# Patient Record
Sex: FEMALE | Race: WHITE | NOT HISPANIC OR LATINO | Employment: OTHER | URBAN - METROPOLITAN AREA
[De-identification: names, ages, dates, MRNs, and addresses within clinical notes are randomized per-mention and may not be internally consistent; named-entity substitution may affect disease eponyms.]

---

## 2017-01-10 DIAGNOSIS — I48.91 ATRIAL FIBRILLATION, UNSPECIFIED TYPE: Primary | ICD-10-CM

## 2017-03-08 DIAGNOSIS — R41.3 MEMORY LOSS: ICD-10-CM

## 2017-03-08 RX ORDER — MEMANTINE HYDROCHLORIDE 5 MG/1
TABLET ORAL
Qty: 80 TABLET | Refills: 0 | Status: SHIPPED | OUTPATIENT
Start: 2017-03-08 | End: 2017-03-21 | Stop reason: SDUPTHER

## 2017-03-08 NOTE — TELEPHONE ENCOUNTER
Pt has an appointment scheduled for Dr. Laureano on 04/11/2017 at 8 o'clock. However, she does not have anymore Namenda. Pt daughter is requesting a refill on the medication until the patient is seen by Dr. Laureano.

## 2017-03-16 DIAGNOSIS — R41.3 MEMORY LOSS: ICD-10-CM

## 2017-03-16 RX ORDER — MEMANTINE HYDROCHLORIDE 5 MG/1
TABLET ORAL
Qty: 80 TABLET | Refills: 0 | Status: CANCELLED | OUTPATIENT
Start: 2017-03-16

## 2017-03-21 DIAGNOSIS — R41.3 MEMORY LOSS: ICD-10-CM

## 2017-03-22 RX ORDER — MEMANTINE HYDROCHLORIDE 5 MG/1
TABLET ORAL
Qty: 80 TABLET | Refills: 0 | Status: SHIPPED | OUTPATIENT
Start: 2017-03-22 | End: 2017-04-11

## 2017-04-11 ENCOUNTER — OFFICE VISIT (OUTPATIENT)
Dept: NEUROLOGY | Facility: CLINIC | Age: 82
End: 2017-04-11
Payer: MEDICARE

## 2017-04-11 VITALS
HEART RATE: 80 BPM | WEIGHT: 195.75 LBS | DIASTOLIC BLOOD PRESSURE: 88 MMHG | BODY MASS INDEX: 33.42 KG/M2 | SYSTOLIC BLOOD PRESSURE: 160 MMHG | HEIGHT: 64 IN

## 2017-04-11 DIAGNOSIS — G30.1 LATE ONSET ALZHEIMER'S DISEASE WITHOUT BEHAVIORAL DISTURBANCE: Primary | ICD-10-CM

## 2017-04-11 DIAGNOSIS — F02.80 LATE ONSET ALZHEIMER'S DISEASE WITHOUT BEHAVIORAL DISTURBANCE: Primary | ICD-10-CM

## 2017-04-11 PROCEDURE — 1157F ADVNC CARE PLAN IN RCRD: CPT | Mod: S$GLB,,, | Performed by: PSYCHIATRY & NEUROLOGY

## 2017-04-11 PROCEDURE — 99499 UNLISTED E&M SERVICE: CPT | Mod: S$GLB,,, | Performed by: PSYCHIATRY & NEUROLOGY

## 2017-04-11 PROCEDURE — 3079F DIAST BP 80-89 MM HG: CPT | Mod: S$GLB,,, | Performed by: PSYCHIATRY & NEUROLOGY

## 2017-04-11 PROCEDURE — 99999 PR PBB SHADOW E&M-EST. PATIENT-LVL III: CPT | Mod: PBBFAC,,, | Performed by: PSYCHIATRY & NEUROLOGY

## 2017-04-11 PROCEDURE — 3077F SYST BP >= 140 MM HG: CPT | Mod: S$GLB,,, | Performed by: PSYCHIATRY & NEUROLOGY

## 2017-04-11 PROCEDURE — 1126F AMNT PAIN NOTED NONE PRSNT: CPT | Mod: S$GLB,,, | Performed by: PSYCHIATRY & NEUROLOGY

## 2017-04-11 PROCEDURE — 99204 OFFICE O/P NEW MOD 45 MIN: CPT | Mod: S$GLB,,, | Performed by: PSYCHIATRY & NEUROLOGY

## 2017-04-11 PROCEDURE — 1159F MED LIST DOCD IN RCRD: CPT | Mod: S$GLB,,, | Performed by: PSYCHIATRY & NEUROLOGY

## 2017-04-11 PROCEDURE — 1160F RVW MEDS BY RX/DR IN RCRD: CPT | Mod: S$GLB,,, | Performed by: PSYCHIATRY & NEUROLOGY

## 2017-04-11 NOTE — PROGRESS NOTES
This is an 81-year-old right-handed patient who is accompanied to the office today by her  and daughter for follow-up visit after she had been seen by neurology about 2 years ago for complaints of memory impairment.  On a previous visit, the patient had appropriate laboratory studies done including CT scan, B12 level, and TSH.  The patient was placed on Namenda which she has continued to take.    The patient's daughter indicates that the patient has had progressive decline in recent memory.  She does not recall names, does not remember or recall content of conversations, and asks for repeats multiple times over and over.  She remains independent for dressing, bathing and hygiene.  However, cooking in her kitchen is increasingly difficult as she cannot remember recipes.  She will then call her daughter and asked her how to perform certain cooking activities.    The daughter indicates that the patient is no longer writing checks or managing finances although she had done that most of her adult life.  She does have occasional difficulties understanding phone conversations.  She has not had any active hallucinations and there is no evidence of sundowning are nocturnal wandering.  There has been no change in her diet or weight.    The patient has significant mobility difficulties because of multiple orthopedic procedures on her back, hip, and knee although she remains independent for ambulation with a quad cane.  She has not had any seizure or unexplained loss of consciousness.  The patient's daughter indicates that she is physically inactive but continues to work crossword puzzles.      ROS:  GENERAL: No fever, chills, fatigability or weight loss.  SKIN: No rashes, itching or changes in color or texture of skin.  HEAD: No headaches or recent head trauma.  EYES: Visual acuity fine. No photophobia, ocular pain or diplopia.  EARS: Denies ear pain, discharge or vertigo.  NOSE: No loss of smell, no epistaxis or  postnasal drip.  MOUTH & THROAT: No hoarseness or change in voice. No excessive gum bleeding.  NODES: Denies swollen glands.  CHEST: Denies BOWMAN, cyanosis, wheezing, cough and sputum production.  CARDIOVASCULAR: Denies chest pain, PND, orthopnea or reduced exercise tolerance.  ABDOMEN: Appetite fine. No weight loss. Denies diarrhea, abdominal pain, hematemesis or blood in stool.  URINARY: No flank pain, dysuria or hematuria.  PERIPHERAL VASCULAR: No claudication or cyanosis.  MUSCULOSKELETAL: The patient has chronic back, hip and knee pain.  NEUROLOGIC: No history of seizures, paralysis, or unexplained loss of consciousness.    The patient's past history, family history, and social history were reviewed with the patient and her electronic medical record.    PE:   VITAL SIGNS: Blood pressure 160/88, pulse 80, weight 88.8 kg, height 5 foot 4 inches, BMI 33.60  APPEARANCE: Well nourished, well developed, in no acute distress.  The patient is very pleasant.  She is very neatly dressed.  HEAD: Normocephalic, atraumatic.  EYES: PERRL. EOMI.  Non-icteric sclerae.    EARS: TM's intact. Light reflex normal. No retraction or perforation.    NOSE: Mucosa pink. Airway clear.  MOUTH & THROAT: No tonsillar enlargement. No pharyngeal erythema or exudate. No stridor.  NECK: Supple. No bruits.  CHEST: Lungs clear to auscultation.  CARDIOVASCULAR: Regular rhythm without significant murmurs.  ABDOMEN: Bowel sounds normal. Not distended. Soft. No tenderness or masses.  MUSCULOSKELETAL:  No bony deformity seen.  Muscle tone and muscle mass appear normal both upper and both lower extremities.  NEUROLOGIC:   Mental Status:   The patient is attentive to the environment and cooperative for the exam.  The patient was able to repeat 3 digits backwards.  She was unable to spell the word world backwards.  She was able to identify 1 out of 3 animal pictures.  She was unable to recall any words on delayed recall.  Cranial Nerves: II-XII grossly  intact. Fundoscopic exam is normal.  No hemorrhage, exudate or papilledema is present. The extraocular muscles are intact in the cardinal directions of gaze.  No ptosis is present. Facial features are symmetrical.  Speech is normal in fluency, diction, and phrasing.  Tongue protrudes in the midline.    Gait and Station:  Romberg is negative.  The patient ambulates with a quad cane.  She is independent for sit to stand and independent with ambulation with her quad cane.  Motor:  No downdrift of either arm when held at shoulder level.  Manual muscle testing of proximal and distal muscles of both upper and lower extremities is normal.   Sensory:  Intact both upper and lower extremities to pin prick, touch, and vibration.  Cerebellar:  Finger to nose done well.  Alternating movements intact.  No involuntary movements or tremor seen.  Reflexes:  Stretch reflexes are 2+ both upper and lower extremities.  Plantar stimulation is flexor bilaterally and no pathological reflexes are seen       ASSESSMENT:  1.  Dementia, unknown type  2.  Straight of hypertension, hyperlipidemia, and hypothyroidism    RECOMMENDATIONS:  1.  Namenda seems to be of little or no benefit as judged by the patient and her family.  Therefore this medication will be discontinued.  We did discuss alternative medication such as Aricept or Exelon, but the patient and her family indicates that they feel she is taking enough medication and do not want to add medications.  I would concur with that thought.  2.  Return to neurology for follow-up visit in 6 months.    This was a 45 minute visit with the patient with over 50% of time spent counseling the patient and the family regarding the diagnosis and treatment of dementia.

## 2017-04-11 NOTE — MR AVS SNAPSHOT
[facility]  [address] [facility] [address]  Phone: [phone]  Fax: [phone]                  Delores M Cryer   2017 8:00 AM   Office Visit    Description:  Female : 1935   Provider:  Ankur Laureano MD   Department:  [facility]           Reason for Visit     Memory Loss           Diagnoses this Visit        Comments    Late onset Alzheimer's disease without behavioral disturbance    -  Primary            To Do List           Future Appointments        Provider Department Dept Phone    2017 1:30 PM Yi Gonzalez MD [facility] - Rheumatology [phone]      Goals (5 Years of Data)     None      Follow-Up and Disposition     Return in about 6 months (around 10/11/2017).      [facility]     [facility] Nurse Care Line -  Assistance  Unless otherwise directed by your provider, please contact [facility], our nurse care line that is available for  assistance.     Registered nurses in the [facility] provide: appointment scheduling, clinical advisement, health education, and other advisory services.  Call: [phone] (toll free)               Medications           Message regarding Medications     Verify the changes and/or additions to your medication regime listed below are the same as discussed with your clinician today.  If any of these changes or additions are incorrect, please notify your healthcare provider.        STOP taking these medications     memantine (NAMENDA) 5 MG Tab 1 tablet once a day for 2 weeks then 1 tablet twice a day.           Verify that the below list of medications is an accurate representation of the medications you are currently taking.  If none reported, the list may be blank. If incorrect, please contact your healthcare provider. Carry this list with you in case of emergency.           Current Medications     albuterol 90 mcg/actuation inhaler Inhale 2 puffs into the lungs  "every 6 (six) hours as needed for Wheezing.    aspirin 81 mg Tab Take 1 tablet by mouth Daily.    atorvastatin (LIPITOR) 20 MG tablet take 1 tablet by mouth every evening    calcium carbonate-vit D3-min (CALTRATE 600+D PLUS MINERALS) 600 mg (1,500 mg)-400 unit Chew Take 1 tablet by mouth Twice daily.    citalopram (CELEXA) 20 MG tablet take 1 tablet by mouth once daily    fluticasone (FLONASE) 50 mcg/actuation nasal spray 2 sprays by Each Nare route once daily.    guaifenesin (MUCINEX) 600 mg 12 hr tablet Take 2 tablets (1,200 mg total) by mouth 2 (two) times daily.    levothyroxine (SYNTHROID) 125 MCG tablet take 1 tablet by mouth once daily    losartan (COZAAR) 100 MG tablet Take 1 tablet (100 mg total) by mouth once daily.    metoprolol succinate (TOPROL-XL) 50 MG 24 hr tablet take 1 and 1/2 tablets by mouth twice a day    MULTIVITAMIN W-MINERALS/LUTEIN (CENTRUM SILVER ORAL) Take 1 tablet by mouth.    nitroGLYCERIN (NITROSTAT) 0.4 MG SL tablet Place 1 tablet under the tongue. Every 5 min - up to 3    omeprazole (PRILOSEC) 40 MG capsule take 1 capsule by mouth once daily    potassium chloride SA (KLOR-CON M20) 20 MEQ tablet Take 1 tablet (20 mEq total) by mouth 2 (two) times daily.           Clinical Reference Information           Your Vitals Were     BP Pulse Height Weight BMI    160/88 80 5' 4" (1.626 m) 88.8 kg (195 lb 12.3 oz) 33.6 kg/m2      Blood Pressure          Most Recent Value    BP  (!)  160/88      Allergies as of 4/11/2017     Alendronate    Nitrofurantoin Macrocrystalline    Sulfa (Sulfonamide Antibiotics)    Warfarin      Immunizations Administered on Date of Encounter - 4/11/2017     None      [facility] Sign-Up     Activating your [facility] account is as easy as 1-2-3!     1) Visit [email or web address], select Sign Up Now, enter this activation code and your date of birth, then select Next.  [email or web address]  Expires: 5/26/2017  8:46 AM      2) Create a username and password to use when you visit " [facility] in the future and select a security question in case you lose your password and select Next.    3) Enter your e-mail address and click Sign Up!    Additional Information  If you have questions, please e-mail [email or web address] or call [phone] to talk to our [facility] staff. Remember, [facility] is NOT to be used for urgent needs. For medical emergencies, dial 911.         Language Assistance Services     ATTENTION: Language assistance services are available, free of charge. Please call [phone].      ATENCIÓN: Si habla español, tiene a hackett disposición servicios gratuitos de asistencia lingüística. Llame al [phone].     CHÚ Ý: N?u b?n nói Ti?ng Vi?t, có các d?ch v? h? tr? ngôn ng? mi?n phí dành cho b?n. G?i s? [phone].         [facility] complies with applicable Federal civil rights laws and does not discriminate on the basis of race, color, national origin, age, disability, or sex.

## 2017-04-24 ENCOUNTER — PATIENT MESSAGE (OUTPATIENT)
Dept: FAMILY MEDICINE | Facility: CLINIC | Age: 82
End: 2017-04-24

## 2017-04-24 ENCOUNTER — OFFICE VISIT (OUTPATIENT)
Dept: FAMILY MEDICINE | Facility: CLINIC | Age: 82
End: 2017-04-24
Payer: MEDICARE

## 2017-04-24 ENCOUNTER — LAB VISIT (OUTPATIENT)
Dept: LAB | Facility: HOSPITAL | Age: 82
End: 2017-04-24
Attending: FAMILY MEDICINE
Payer: MEDICARE

## 2017-04-24 VITALS
HEIGHT: 64 IN | WEIGHT: 194.31 LBS | DIASTOLIC BLOOD PRESSURE: 83 MMHG | HEART RATE: 104 BPM | OXYGEN SATURATION: 98 % | SYSTOLIC BLOOD PRESSURE: 149 MMHG | BODY MASS INDEX: 33.17 KG/M2 | TEMPERATURE: 98 F

## 2017-04-24 DIAGNOSIS — I20.0 UNSTABLE ANGINA: ICD-10-CM

## 2017-04-24 DIAGNOSIS — E03.9 ACQUIRED HYPOTHYROIDISM: ICD-10-CM

## 2017-04-24 DIAGNOSIS — R79.9 ABNORMAL FINDING OF BLOOD CHEMISTRY: ICD-10-CM

## 2017-04-24 DIAGNOSIS — I48.0 PAROXYSMAL ATRIAL FIBRILLATION: Primary | ICD-10-CM

## 2017-04-24 DIAGNOSIS — M81.8 OSTEOPOROSIS OF DISUSE: ICD-10-CM

## 2017-04-24 DIAGNOSIS — I10 ESSENTIAL HYPERTENSION: ICD-10-CM

## 2017-04-24 DIAGNOSIS — I21.4 NSTEMI (NON-ST ELEVATED MYOCARDIAL INFARCTION): ICD-10-CM

## 2017-04-24 DIAGNOSIS — I21.4 NON-ST ELEVATION MYOCARDIAL INFARCTION (NSTEMI), SUBSEQUENT EPISODE OF CARE: ICD-10-CM

## 2017-04-24 LAB
ALBUMIN SERPL BCP-MCNC: 3.8 G/DL
ALP SERPL-CCNC: 122 U/L
ALT SERPL W/O P-5'-P-CCNC: 12 U/L
ANION GAP SERPL CALC-SCNC: 11 MMOL/L
AST SERPL-CCNC: 21 U/L
BASOPHILS # BLD AUTO: 0.02 K/UL
BASOPHILS NFR BLD: 0.2 %
BILIRUB SERPL-MCNC: 1.3 MG/DL
BUN SERPL-MCNC: 18 MG/DL
CALCIUM SERPL-MCNC: 9.2 MG/DL
CHLORIDE SERPL-SCNC: 105 MMOL/L
CHOLEST/HDLC SERPL: 3.5 {RATIO}
CO2 SERPL-SCNC: 24 MMOL/L
CREAT SERPL-MCNC: 0.8 MG/DL
DIFFERENTIAL METHOD: NORMAL
EOSINOPHIL # BLD AUTO: 0.1 K/UL
EOSINOPHIL NFR BLD: 1.4 %
ERYTHROCYTE [DISTWIDTH] IN BLOOD BY AUTOMATED COUNT: 13.5 %
EST. GFR  (AFRICAN AMERICAN): >60 ML/MIN/1.73 M^2
EST. GFR  (NON AFRICAN AMERICAN): >60 ML/MIN/1.73 M^2
GLUCOSE SERPL-MCNC: 82 MG/DL
HCT VFR BLD AUTO: 46.4 %
HDL/CHOLESTEROL RATIO: 28.5 %
HDLC SERPL-MCNC: 151 MG/DL
HDLC SERPL-MCNC: 43 MG/DL
HGB BLD-MCNC: 15.4 G/DL
LDLC SERPL CALC-MCNC: 88.8 MG/DL
LYMPHOCYTES # BLD AUTO: 2.4 K/UL
LYMPHOCYTES NFR BLD: 25.8 %
MCH RBC QN AUTO: 30.1 PG
MCHC RBC AUTO-ENTMCNC: 33.2 %
MCV RBC AUTO: 91 FL
MONOCYTES # BLD AUTO: 0.6 K/UL
MONOCYTES NFR BLD: 6.6 %
NEUTROPHILS # BLD AUTO: 6.1 K/UL
NEUTROPHILS NFR BLD: 65.8 %
NONHDLC SERPL-MCNC: 108 MG/DL
PLATELET # BLD AUTO: 168 K/UL
PMV BLD AUTO: 11.4 FL
POTASSIUM SERPL-SCNC: 4.3 MMOL/L
PROT SERPL-MCNC: 7 G/DL
RBC # BLD AUTO: 5.12 M/UL
SODIUM SERPL-SCNC: 140 MMOL/L
TRIGL SERPL-MCNC: 96 MG/DL
TSH SERPL DL<=0.005 MIU/L-ACNC: 2.39 UIU/ML
WBC # BLD AUTO: 9.28 K/UL

## 2017-04-24 PROCEDURE — 1159F MED LIST DOCD IN RCRD: CPT | Mod: S$GLB,,, | Performed by: FAMILY MEDICINE

## 2017-04-24 PROCEDURE — 99999 PR PBB SHADOW E&M-EST. PATIENT-LVL III: CPT | Mod: PBBFAC,,, | Performed by: FAMILY MEDICINE

## 2017-04-24 PROCEDURE — 1126F AMNT PAIN NOTED NONE PRSNT: CPT | Mod: S$GLB,,, | Performed by: FAMILY MEDICINE

## 2017-04-24 PROCEDURE — 36415 COLL VENOUS BLD VENIPUNCTURE: CPT | Mod: PO

## 2017-04-24 PROCEDURE — 1160F RVW MEDS BY RX/DR IN RCRD: CPT | Mod: S$GLB,,, | Performed by: FAMILY MEDICINE

## 2017-04-24 PROCEDURE — 83036 HEMOGLOBIN GLYCOSYLATED A1C: CPT

## 2017-04-24 PROCEDURE — 99499 UNLISTED E&M SERVICE: CPT | Mod: S$GLB,,, | Performed by: FAMILY MEDICINE

## 2017-04-24 PROCEDURE — 80053 COMPREHEN METABOLIC PANEL: CPT

## 2017-04-24 PROCEDURE — 3080F DIAST BP >= 90 MM HG: CPT | Mod: S$GLB,,, | Performed by: FAMILY MEDICINE

## 2017-04-24 PROCEDURE — 80061 LIPID PANEL: CPT

## 2017-04-24 PROCEDURE — 84443 ASSAY THYROID STIM HORMONE: CPT

## 2017-04-24 PROCEDURE — 99214 OFFICE O/P EST MOD 30 MIN: CPT | Mod: S$GLB,,, | Performed by: FAMILY MEDICINE

## 2017-04-24 PROCEDURE — 85025 COMPLETE CBC W/AUTO DIFF WBC: CPT

## 2017-04-24 PROCEDURE — 3077F SYST BP >= 140 MM HG: CPT | Mod: S$GLB,,, | Performed by: FAMILY MEDICINE

## 2017-04-24 RX ORDER — LEVOTHYROXINE SODIUM 125 UG/1
125 TABLET ORAL DAILY
Qty: 30 TABLET | Refills: 11 | Status: SHIPPED | OUTPATIENT
Start: 2017-04-24 | End: 2018-05-29 | Stop reason: SDUPTHER

## 2017-04-24 RX ORDER — ATORVASTATIN CALCIUM 20 MG/1
20 TABLET, FILM COATED ORAL NIGHTLY
Qty: 30 TABLET | Refills: 11 | Status: SHIPPED | OUTPATIENT
Start: 2017-04-24 | End: 2017-10-03 | Stop reason: SDUPTHER

## 2017-04-24 RX ORDER — FLUTICASONE PROPIONATE 50 MCG
2 SPRAY, SUSPENSION (ML) NASAL DAILY
Qty: 16 G | Refills: 11 | Status: SHIPPED | OUTPATIENT
Start: 2017-04-24 | End: 2018-05-28

## 2017-04-24 RX ORDER — CITALOPRAM 20 MG/1
20 TABLET, FILM COATED ORAL DAILY
Qty: 30 TABLET | Refills: 3 | Status: SHIPPED | OUTPATIENT
Start: 2017-04-24 | End: 2017-09-18 | Stop reason: SDUPTHER

## 2017-04-24 RX ORDER — OMEPRAZOLE 40 MG/1
40 CAPSULE, DELAYED RELEASE ORAL DAILY
Qty: 30 CAPSULE | Refills: 4 | Status: SHIPPED | OUTPATIENT
Start: 2017-04-24 | End: 2017-11-19 | Stop reason: SDUPTHER

## 2017-04-24 RX ORDER — LOSARTAN POTASSIUM 100 MG/1
100 TABLET ORAL DAILY
Qty: 30 TABLET | Refills: 11 | Status: SHIPPED | OUTPATIENT
Start: 2017-04-24 | End: 2017-10-03 | Stop reason: SDUPTHER

## 2017-04-24 NOTE — MR AVS SNAPSHOT
[facility]  [address]  [facility] [address]  Phone: [phone]  Fax: [phone]                  Delores M Cryer   2017 11:20 AM   Office Visit    Description:  Female : 1935   Provider:  Viviana Ervin MD   Department:  [facility]           Reason for Visit     Medication Refill           Diagnoses this Visit        Comments    Paroxysmal atrial fibrillation    -  Primary     Essential hypertension         Non-ST elevation myocardial infarction (NSTEMI), subsequent episode of care         Acquired hypothyroidism         Osteoporosis of disuse                To Do List           Future Appointments        Provider Department Dept Phone    2017 3:10 PM [facility]     2017 2:30 PM Jeet Lewis MD [facility] - Rheumatology [phone]      Goals (5 Years of Data)     None       These Medications        Disp Refills Start End    atorvastatin (LIPITOR) 20 MG tablet 30 tablet 11 2017     Take 1 tablet (20 mg total) by mouth every evening. - Oral    Pharmacy: [facility] [facility] [address] [place] [address] Ph #: [phone]       citalopram (CELEXA) 20 MG tablet 30 tablet 3 2017     Take 1 tablet (20 mg total) by mouth once daily. - Oral    Pharmacy: [facility] [facility] [address] [facility] Ph #: [phone]       fluticasone (FLONASE) 50 mcg/actuation nasal spray 16 g 11 2017     2 sprays by Each Nare route once daily. - Each Nare    Pharmacy: [facility] [facility] [address] [facility] Ph #: [phone]       levothyroxine (SYNTHROID) 125 MCG tablet 30 tablet 11 2017     Take 1 tablet (125 mcg total) by mouth once daily. - Oral    Pharmacy: [facility] [facility] [address] [facility] Ph #: [phone]       losartan (COZAAR) 100 MG tablet  30 tablet 11 4/24/2017     Take 1 tablet (100 mg total) by mouth once daily. - Oral    Pharmacy: [facility] [place] [place] [facility] [address] [facility] Ph #: [phone]       omeprazole (PRILOSEC) 40 MG capsule 30 capsule 4 4/24/2017     Take 1 capsule (40 mg total) by mouth once daily. - Oral    Pharmacy: [facility] [facility] [place] [facility] [address] [facility] Ph #: [phone]         [facility]     [facility] Nurse Care Line - 24/7 Assistance  Unless otherwise directed by your provider, please contact [facility], our nurse care line that is available for 24/7 assistance.     Registered nurses in the [facility] provide: appointment scheduling, clinical advisement, health education, and other advisory services.  Call: [phone] (toll free)               Medications           Message regarding Medications     Verify the changes and/or additions to your medication regime listed below are the same as discussed with your clinician today.  If any of these changes or additions are incorrect, please notify your healthcare provider.        CHANGE how you are taking these medications     Start Taking Instead of    atorvastatin (LIPITOR) 20 MG tablet atorvastatin (LIPITOR) 20 MG tablet    Dosage:  Take 1 tablet (20 mg total) by mouth every evening. Dosage:  take 1 tablet by mouth every evening    Reason for Change:  Reorder     citalopram (CELEXA) 20 MG tablet citalopram (CELEXA) 20 MG tablet    Dosage:  Take 1 tablet (20 mg total) by mouth once daily. Dosage:  take 1 tablet by mouth once daily    Reason for Change:  Reorder     levothyroxine (SYNTHROID) 125 MCG tablet levothyroxine (SYNTHROID) 125 MCG tablet    Dosage:  Take 1 tablet (125 mcg total) by mouth once daily. Dosage:  take 1 tablet by mouth once daily    Reason for Change:  Reorder     omeprazole (PRILOSEC) 40 MG capsule omeprazole (PRILOSEC) 40 MG capsule    Dosage:  Take 1 capsule (40 mg  total) by mouth once daily. Dosage:  take 1 capsule by mouth once daily    Reason for Change:  Reorder            Verify that the below list of medications is an accurate representation of the medications you are currently taking.  If none reported, the list may be blank. If incorrect, please contact your healthcare provider. Carry this list with you in case of emergency.           Current Medications     albuterol 90 mcg/actuation inhaler Inhale 2 puffs into the lungs every 6 (six) hours as needed for Wheezing.    aspirin 81 mg Tab Take 1 tablet by mouth Daily.    atorvastatin (LIPITOR) 20 MG tablet Take 1 tablet (20 mg total) by mouth every evening.    calcium carbonate-vit D3-min (CALTRATE 600+D PLUS MINERALS) 600 mg (1,500 mg)-400 unit Chew Take 1 tablet by mouth Twice daily.    citalopram (CELEXA) 20 MG tablet Take 1 tablet (20 mg total) by mouth once daily.    fluticasone (FLONASE) 50 mcg/actuation nasal spray 2 sprays by Each Nare route once daily.    guaifenesin (MUCINEX) 600 mg 12 hr tablet Take 2 tablets (1,200 mg total) by mouth 2 (two) times daily.    levothyroxine (SYNTHROID) 125 MCG tablet Take 1 tablet (125 mcg total) by mouth once daily.    losartan (COZAAR) 100 MG tablet Take 1 tablet (100 mg total) by mouth once daily.    metoprolol succinate (TOPROL-XL) 50 MG 24 hr tablet take 1 and 1/2 tablets by mouth twice a day    MULTIVITAMIN W-MINERALS/LUTEIN (CENTRUM SILVER ORAL) Take 1 tablet by mouth.    nitroGLYCERIN (NITROSTAT) 0.4 MG SL tablet Place 1 tablet under the tongue. Every 5 min - up to 3    omeprazole (PRILOSEC) 40 MG capsule Take 1 capsule (40 mg total) by mouth once daily.    potassium chloride SA (KLOR-CON M20) 20 MEQ tablet Take 1 tablet (20 mEq total) by mouth 2 (two) times daily.           Clinical Reference Information           Your Vitals Were     BP Pulse Temp Height Weight SpO2    160/90 (BP Location: Left arm, Patient Position: Sitting, BP Method: Manual) 104 97.8 °F (36.6 °C)  "(Oral) 5' 4" (1.626 m) 88.2 kg (194 lb 5.4 oz) 98%    BMI                33.36 kg/m2          Blood Pressure          Most Recent Value    BP  (!)  160/90      Allergies as of 4/24/2017     Alendronate    Nitrofurantoin Macrocrystalline    Sulfa (Sulfonamide Antibiotics)    Warfarin      Immunizations Administered on Date of Encounter - 4/24/2017     Name Date Dose VIS Date Route    TDAP  Incomplete 0.5 mL 2/24/2015 Intramuscular      Orders Placed During Today's Visit      Normal Orders This Visit    Ambulatory consult to Rheumatology     Tdap Vaccine     Future Labs/Procedures Expected by Expires    TSH  4/24/2017 7/23/2017      Language Assistance Services     ATTENTION: Language assistance services are available, free of charge. Please call [phone].      ATENCIÓN: Si jamila jones, tiene a hackett disposición servicios gratuitos de asistencia lingüística. Llame al [phone].     AKASH Ý: N?u b?n nói Ti?ng Vi?t, có các d?ch v? h? tr? ngôn ng? mi?n phí dành cho b?n. G?i s? [phone].         [facility] complies with applicable Federal civil rights laws and does not discriminate on the basis of race, color, national origin, age, disability, or sex.        "

## 2017-04-24 NOTE — PROGRESS NOTES
Subjective:       Patient ID: Delores M Cryer is a 81 y.o. female.    Chief Complaint: Medication Refill    HPI Comments: 81 y old female with CAD,  A fib ,osteoporosis, dementia  and hypothyroidism here for f.u . Denies chest pain , sob ,palpitations , compliant with statin and aspirin , no longer taking plavix due to frequent falls  . Sees Dr Martino. No falls lately , she could not tolerate fosamax due to arthralgias, agrees to see  Rheum  . Taking VIT D . Just  saw neuro who stopped  Namenda .  She still does cooking , independent with hygiene . No behavioral disturbances . Compliant with levothyroxine     Medication Refill       Review of Systems   Constitutional: Negative.    HENT: Negative.    Cardiovascular: Negative.    Gastrointestinal: Negative.    Genitourinary: Negative.    Psychiatric/Behavioral: Positive for confusion.       Objective:      Physical Exam   Constitutional: She is oriented to person, place, and time. She appears well-developed and well-nourished. No distress.   HENT:   Head: Normocephalic and atraumatic.   Right Ear: External ear normal.   Left Ear: External ear normal.   Mouth/Throat: No oropharyngeal exudate.   Eyes: Conjunctivae and EOM are normal. Pupils are equal, round, and reactive to light. Right eye exhibits no discharge. Left eye exhibits no discharge. No scleral icterus.   Neck: Normal range of motion. Neck supple. No JVD present. No tracheal deviation present. No thyromegaly present.   Cardiovascular: Normal heart sounds.  An irregularly irregular rhythm present. Exam reveals no gallop and no friction rub.    No murmur heard.  Pulmonary/Chest: Effort normal and breath sounds normal. No stridor. No respiratory distress. She has no wheezes. She has no rales. She exhibits no tenderness.   Abdominal: Soft. Bowel sounds are normal. She exhibits no distension. There is no tenderness. There is no rebound and no guarding.   Musculoskeletal: Normal range of motion.   Lymphadenopathy:      She has no cervical adenopathy.   Neurological: She is alert and oriented to person, place, and time.   Skin: Skin is warm and dry. She is not diaphoretic.   Psychiatric: She has a normal mood and affect. Her behavior is normal. Judgment and thought content normal.       Assessment:     Tania was seen today for medication refill.    Diagnoses and all orders for this visit:    Paroxysmal atrial fibrillation    Essential hypertension  -     losartan (COZAAR) 100 MG tablet; Take 1 tablet (100 mg total) by mouth once daily.    Non-ST elevation myocardial infarction (NSTEMI), subsequent episode of care    Acquired hypothyroidism  -     TSH; Future    Osteoporosis of disuse  -     Ambulatory consult to Rheumatology    Other orders  -     atorvastatin (LIPITOR) 20 MG tablet; Take 1 tablet (20 mg total) by mouth every evening.  -     citalopram (CELEXA) 20 MG tablet; Take 1 tablet (20 mg total) by mouth once daily.  -     fluticasone (FLONASE) 50 mcg/actuation nasal spray; 2 sprays by Each Nare route once daily.  -     levothyroxine (SYNTHROID) 125 MCG tablet; Take 1 tablet (125 mcg total) by mouth once daily.  -     omeprazole (PRILOSEC) 40 MG capsule; Take 1 capsule (40 mg total) by mouth once daily.  -     Tdap Vaccine          Plan:     Tania was seen today for medication refill.    Diagnoses and all orders for this visit:    Paroxysmal atrial fibrillation    Essential hypertension  -     losartan (COZAAR) 100 MG tablet; Take 1 tablet (100 mg total) by mouth once daily.    Non-ST elevation myocardial infarction (NSTEMI), subsequent episode of care    Other orders  -     atorvastatin (LIPITOR) 20 MG tablet; Take 1 tablet (20 mg total) by mouth every evening.  -     citalopram (CELEXA) 20 MG tablet; Take 1 tablet (20 mg total) by mouth once daily.  -     fluticasone (FLONASE) 50 mcg/actuation nasal spray; 2 sprays by Each Nare route once daily.  -     levothyroxine (SYNTHROID) 125 MCG tablet; Take 1 tablet (125  mcg total) by mouth once daily.  -     omeprazole (PRILOSEC) 40 MG capsule; Take 1 capsule (40 mg total) by mouth once daily.     cont med. F.u with card   Repeated BP was elevated . Will advise to monitor BID for 10 days and get back with us them   Stable . F.u with card   Get labs   See rheum

## 2017-04-25 LAB
ESTIMATED AVG GLUCOSE: 108 MG/DL
HBA1C MFR BLD HPLC: 5.4 %

## 2017-04-26 ENCOUNTER — TELEPHONE (OUTPATIENT)
Dept: FAMILY MEDICINE | Facility: CLINIC | Age: 82
End: 2017-04-26

## 2017-04-26 DIAGNOSIS — R17 ELEVATED BILIRUBIN: Primary | ICD-10-CM

## 2017-05-09 ENCOUNTER — PATIENT MESSAGE (OUTPATIENT)
Dept: FAMILY MEDICINE | Facility: CLINIC | Age: 82
End: 2017-05-09

## 2017-05-18 ENCOUNTER — TELEPHONE (OUTPATIENT)
Dept: FAMILY MEDICINE | Facility: CLINIC | Age: 82
End: 2017-05-18

## 2017-05-18 NOTE — TELEPHONE ENCOUNTER
Called patients daughter, no answer. Left message to return call and schedule appt. Also sent [facility] message through patient portal.

## 2017-05-18 NOTE — TELEPHONE ENCOUNTER
----- Message from Tonia Thmopson sent at 5/18/2017  3:06 PM CDT -----  Contact: viola/daughter [phone]  States that she is returning call please call back at [phone]//thank you acc

## 2017-05-24 ENCOUNTER — OFFICE VISIT (OUTPATIENT)
Dept: FAMILY MEDICINE | Facility: CLINIC | Age: 82
End: 2017-05-24
Payer: MEDICARE

## 2017-05-24 VITALS
TEMPERATURE: 98 F | DIASTOLIC BLOOD PRESSURE: 80 MMHG | SYSTOLIC BLOOD PRESSURE: 170 MMHG | WEIGHT: 194 LBS | HEART RATE: 90 BPM | BODY MASS INDEX: 33.3 KG/M2

## 2017-05-24 DIAGNOSIS — I16.0 HYPERTENSIVE URGENCY: Primary | ICD-10-CM

## 2017-05-24 PROCEDURE — 99999 PR PBB SHADOW E&M-EST. PATIENT-LVL II: CPT | Mod: PBBFAC,,, | Performed by: FAMILY MEDICINE

## 2017-05-24 PROCEDURE — 3079F DIAST BP 80-89 MM HG: CPT | Mod: S$GLB,,, | Performed by: FAMILY MEDICINE

## 2017-05-24 PROCEDURE — 1159F MED LIST DOCD IN RCRD: CPT | Mod: S$GLB,,, | Performed by: FAMILY MEDICINE

## 2017-05-24 PROCEDURE — 3077F SYST BP >= 140 MM HG: CPT | Mod: S$GLB,,, | Performed by: FAMILY MEDICINE

## 2017-05-24 PROCEDURE — 99213 OFFICE O/P EST LOW 20 MIN: CPT | Mod: S$GLB,,, | Performed by: FAMILY MEDICINE

## 2017-05-24 PROCEDURE — 99499 UNLISTED E&M SERVICE: CPT | Mod: S$GLB,,, | Performed by: FAMILY MEDICINE

## 2017-05-24 PROCEDURE — 1160F RVW MEDS BY RX/DR IN RCRD: CPT | Mod: S$GLB,,, | Performed by: FAMILY MEDICINE

## 2017-05-24 NOTE — PROGRESS NOTES
Subjective:       Patient ID: Delores M Cryer is a 81 y.o. female.    Chief Complaint: No chief complaint on file.    Unsure if she has taken BP meds today . She supposed to take metoprolol 150 mg divided and losartan 100 mg qd . No h/a, CP , sob       Hypertension   This is a chronic problem. The current episode started more than 1 year ago. The problem has been resolved since onset. The problem is controlled. Associated symptoms include peripheral edema and sweats. Pertinent negatives include no anxiety, blurred vision, chest pain, headaches, malaise/fatigue, neck pain, orthopnea, palpitations, PND or shortness of breath. Risk factors for coronary artery disease include obesity and sedentary lifestyle. Compliance problems include exercise.      Review of Systems   Constitutional: Negative for malaise/fatigue.   Eyes: Negative for blurred vision.   Respiratory: Negative for shortness of breath.    Cardiovascular: Negative for chest pain, palpitations, orthopnea and PND.   Musculoskeletal: Negative for neck pain.   Neurological: Negative for headaches.       Objective:      Physical Exam   Constitutional: She is oriented to person, place, and time. She appears well-developed and well-nourished. No distress.   HENT:   Head: Normocephalic and atraumatic.   Right Ear: External ear normal.   Left Ear: External ear normal.   Mouth/Throat: No oropharyngeal exudate.   Eyes: Conjunctivae and EOM are normal. Pupils are equal, round, and reactive to light. Right eye exhibits no discharge. Left eye exhibits no discharge. No scleral icterus.   Neck: Normal range of motion. Neck supple. No JVD present. No tracheal deviation present. No thyromegaly present.   Cardiovascular: Normal rate, regular rhythm and normal heart sounds.  Exam reveals no gallop and no friction rub.    No murmur heard.  Pulmonary/Chest: Effort normal and breath sounds normal. No stridor. No respiratory distress. She has no wheezes. She has no rales. She  exhibits no tenderness.   Abdominal: Soft. Bowel sounds are normal. She exhibits no distension. There is no tenderness. There is no rebound and no guarding.   Musculoskeletal: Normal range of motion.   Lymphadenopathy:     She has no cervical adenopathy.   Neurological: She is alert and oriented to person, place, and time.   Skin: Skin is warm and dry. She is not diaphoretic.   Psychiatric: She has a normal mood and affect. Her behavior is normal. Judgment and thought content normal.       Assessment:     Diagnoses and all orders for this visit:    Hypertensive urgency      Plan:   Bp was lower after checking it 1 hr after taking Metoprolol and losartan  . Daughter will make sure  gives her her meds. Reassess in 1 w

## 2017-05-27 ENCOUNTER — HOSPITAL ENCOUNTER (OUTPATIENT)
Facility: HOSPITAL | Age: 82
Discharge: HOME | End: 2017-05-28
Attending: EMERGENCY MEDICINE | Admitting: HOSPITALIST
Payer: MEDICARE

## 2017-05-27 DIAGNOSIS — R07.9 CHEST PAIN: ICD-10-CM

## 2017-05-27 DIAGNOSIS — I16.0 HYPERTENSIVE URGENCY: ICD-10-CM

## 2017-05-27 DIAGNOSIS — R07.9 CHEST PAIN, UNSPECIFIED TYPE: Primary | ICD-10-CM

## 2017-05-27 LAB
ALBUMIN SERPL BCP-MCNC: 3.6 G/DL
ALP SERPL-CCNC: 108 U/L
ALT SERPL W/O P-5'-P-CCNC: 12 U/L
ANION GAP SERPL CALC-SCNC: 8 MMOL/L
AST SERPL-CCNC: 28 U/L
BASOPHILS # BLD AUTO: 0.02 K/UL
BASOPHILS NFR BLD: 0.2 %
BILIRUB SERPL-MCNC: 1 MG/DL
BNP SERPL-MCNC: 540 PG/ML
BUN SERPL-MCNC: 18 MG/DL
CALCIUM SERPL-MCNC: 9.5 MG/DL
CHLORIDE SERPL-SCNC: 107 MMOL/L
CO2 SERPL-SCNC: 25 MMOL/L
CREAT SERPL-MCNC: 0.9 MG/DL
DIFFERENTIAL METHOD: ABNORMAL
EOSINOPHIL # BLD AUTO: 0.1 K/UL
EOSINOPHIL NFR BLD: 1.4 %
ERYTHROCYTE [DISTWIDTH] IN BLOOD BY AUTOMATED COUNT: 13.3 %
EST. GFR  (AFRICAN AMERICAN): >60 ML/MIN/1.73 M^2
EST. GFR  (NON AFRICAN AMERICAN): >60 ML/MIN/1.73 M^2
GLUCOSE SERPL-MCNC: 106 MG/DL
HCT VFR BLD AUTO: 43 %
HGB BLD-MCNC: 14.7 G/DL
LYMPHOCYTES # BLD AUTO: 1.8 K/UL
LYMPHOCYTES NFR BLD: 17.2 %
MCH RBC QN AUTO: 30.8 PG
MCHC RBC AUTO-ENTMCNC: 34.2 %
MCV RBC AUTO: 90 FL
MONOCYTES # BLD AUTO: 1.2 K/UL
MONOCYTES NFR BLD: 11.3 %
NEUTROPHILS # BLD AUTO: 7.3 K/UL
NEUTROPHILS NFR BLD: 69.9 %
PLATELET # BLD AUTO: 165 K/UL
PMV BLD AUTO: 10.9 FL
POTASSIUM SERPL-SCNC: 5.7 MMOL/L
PROT SERPL-MCNC: 7.4 G/DL
RBC # BLD AUTO: 4.77 M/UL
SODIUM SERPL-SCNC: 140 MMOL/L
TROPONIN I SERPL DL<=0.01 NG/ML-MCNC: 0.02 NG/ML
WBC # BLD AUTO: 10.36 K/UL

## 2017-05-27 PROCEDURE — 93010 ELECTROCARDIOGRAM REPORT: CPT | Mod: ,,, | Performed by: INTERNAL MEDICINE

## 2017-05-27 PROCEDURE — 36415 COLL VENOUS BLD VENIPUNCTURE: CPT

## 2017-05-27 PROCEDURE — 80053 COMPREHEN METABOLIC PANEL: CPT

## 2017-05-27 PROCEDURE — 99285 EMERGENCY DEPT VISIT HI MDM: CPT

## 2017-05-27 PROCEDURE — 85025 COMPLETE CBC W/AUTO DIFF WBC: CPT

## 2017-05-27 PROCEDURE — 84484 ASSAY OF TROPONIN QUANT: CPT

## 2017-05-27 PROCEDURE — 25000003 PHARM REV CODE 250: Performed by: EMERGENCY MEDICINE

## 2017-05-27 PROCEDURE — 83880 ASSAY OF NATRIURETIC PEPTIDE: CPT

## 2017-05-27 PROCEDURE — G0378 HOSPITAL OBSERVATION PER HR: HCPCS

## 2017-05-27 RX ORDER — ATORVASTATIN CALCIUM 10 MG/1
20 TABLET, FILM COATED ORAL NIGHTLY
Status: DISCONTINUED | OUTPATIENT
Start: 2017-05-28 | End: 2017-05-28 | Stop reason: HOSPADM

## 2017-05-27 RX ORDER — METOPROLOL SUCCINATE 50 MG/1
50 TABLET, EXTENDED RELEASE ORAL DAILY
Status: DISCONTINUED | OUTPATIENT
Start: 2017-05-28 | End: 2017-05-28 | Stop reason: HOSPADM

## 2017-05-27 RX ORDER — HYDRALAZINE HYDROCHLORIDE 20 MG/ML
10 INJECTION INTRAMUSCULAR; INTRAVENOUS EVERY 6 HOURS PRN
Status: DISCONTINUED | OUTPATIENT
Start: 2017-05-28 | End: 2017-05-28 | Stop reason: HOSPADM

## 2017-05-27 RX ORDER — NITROGLYCERIN 0.4 MG/1
0.4 TABLET SUBLINGUAL EVERY 5 MIN PRN
Status: DISCONTINUED | OUTPATIENT
Start: 2017-05-28 | End: 2017-05-28 | Stop reason: HOSPADM

## 2017-05-27 RX ORDER — LEVOTHYROXINE SODIUM 125 UG/1
125 TABLET ORAL
Status: DISCONTINUED | OUTPATIENT
Start: 2017-05-28 | End: 2017-05-28 | Stop reason: HOSPADM

## 2017-05-27 RX ORDER — POTASSIUM CHLORIDE 20 MEQ/1
20 TABLET, EXTENDED RELEASE ORAL 2 TIMES DAILY
Status: DISCONTINUED | OUTPATIENT
Start: 2017-05-28 | End: 2017-05-28 | Stop reason: HOSPADM

## 2017-05-27 RX ORDER — PANTOPRAZOLE SODIUM 40 MG/1
40 TABLET, DELAYED RELEASE ORAL DAILY
Status: DISCONTINUED | OUTPATIENT
Start: 2017-05-28 | End: 2017-05-28 | Stop reason: HOSPADM

## 2017-05-27 RX ORDER — ENOXAPARIN SODIUM 100 MG/ML
40 INJECTION SUBCUTANEOUS EVERY 24 HOURS
Status: DISCONTINUED | OUTPATIENT
Start: 2017-05-28 | End: 2017-05-28 | Stop reason: HOSPADM

## 2017-05-27 RX ORDER — CITALOPRAM 20 MG/1
20 TABLET, FILM COATED ORAL DAILY
Status: DISCONTINUED | OUTPATIENT
Start: 2017-05-28 | End: 2017-05-28 | Stop reason: HOSPADM

## 2017-05-27 RX ORDER — ONDANSETRON 2 MG/ML
4 INJECTION INTRAMUSCULAR; INTRAVENOUS EVERY 8 HOURS PRN
Status: DISCONTINUED | OUTPATIENT
Start: 2017-05-28 | End: 2017-05-28 | Stop reason: HOSPADM

## 2017-05-27 RX ORDER — ACETAMINOPHEN 325 MG/1
650 TABLET ORAL EVERY 6 HOURS PRN
Status: DISCONTINUED | OUTPATIENT
Start: 2017-05-28 | End: 2017-05-28 | Stop reason: HOSPADM

## 2017-05-27 RX ORDER — NAPROXEN SODIUM 220 MG/1
81 TABLET, FILM COATED ORAL DAILY
Status: DISCONTINUED | OUTPATIENT
Start: 2017-05-28 | End: 2017-05-28 | Stop reason: HOSPADM

## 2017-05-27 RX ORDER — LOSARTAN POTASSIUM 50 MG/1
100 TABLET ORAL DAILY
Status: DISCONTINUED | OUTPATIENT
Start: 2017-05-28 | End: 2017-05-28 | Stop reason: HOSPADM

## 2017-05-27 RX ORDER — ASPIRIN 325 MG
325 TABLET ORAL
Status: COMPLETED | OUTPATIENT
Start: 2017-05-27 | End: 2017-05-27

## 2017-05-27 RX ADMIN — HYDRALAZINE HYDROCHLORIDE 10 MG: 20 INJECTION INTRAMUSCULAR; INTRAVENOUS at 11:05

## 2017-05-27 RX ADMIN — ASPIRIN 325 MG ORAL TABLET 325 MG: 325 PILL ORAL at 09:05

## 2017-05-27 RX ADMIN — NITROGLYCERIN 1 INCH: 20 OINTMENT TOPICAL at 09:05

## 2017-05-28 VITALS
DIASTOLIC BLOOD PRESSURE: 72 MMHG | HEART RATE: 63 BPM | OXYGEN SATURATION: 96 % | TEMPERATURE: 98 F | RESPIRATION RATE: 18 BRPM | SYSTOLIC BLOOD PRESSURE: 157 MMHG | BODY MASS INDEX: 33.58 KG/M2 | WEIGHT: 196.69 LBS | HEIGHT: 64 IN

## 2017-05-28 PROBLEM — I16.0 HYPERTENSIVE URGENCY: Status: RESOLVED | Noted: 2017-05-27 | Resolved: 2017-05-28

## 2017-05-28 LAB
ANION GAP SERPL CALC-SCNC: 7 MMOL/L
BUN SERPL-MCNC: 14 MG/DL
CALCIUM SERPL-MCNC: 9.4 MG/DL
CHLORIDE SERPL-SCNC: 104 MMOL/L
CO2 SERPL-SCNC: 28 MMOL/L
CREAT SERPL-MCNC: 0.7 MG/DL
EST. GFR  (AFRICAN AMERICAN): >60 ML/MIN/1.73 M^2
EST. GFR  (NON AFRICAN AMERICAN): >60 ML/MIN/1.73 M^2
GLUCOSE SERPL-MCNC: 85 MG/DL
POTASSIUM SERPL-SCNC: 3.9 MMOL/L
SODIUM SERPL-SCNC: 139 MMOL/L
TROPONIN I SERPL DL<=0.01 NG/ML-MCNC: 0.02 NG/ML

## 2017-05-28 PROCEDURE — 99213 OFFICE O/P EST LOW 20 MIN: CPT | Mod: ,,, | Performed by: INTERNAL MEDICINE

## 2017-05-28 PROCEDURE — 84484 ASSAY OF TROPONIN QUANT: CPT

## 2017-05-28 PROCEDURE — 87077 CULTURE AEROBIC IDENTIFY: CPT

## 2017-05-28 PROCEDURE — G0378 HOSPITAL OBSERVATION PER HR: HCPCS

## 2017-05-28 PROCEDURE — 25000003 PHARM REV CODE 250: Performed by: HOSPITALIST

## 2017-05-28 PROCEDURE — 36415 COLL VENOUS BLD VENIPUNCTURE: CPT

## 2017-05-28 PROCEDURE — 81000 URINALYSIS NONAUTO W/SCOPE: CPT

## 2017-05-28 PROCEDURE — 80048 BASIC METABOLIC PNL TOTAL CA: CPT

## 2017-05-28 PROCEDURE — 87086 URINE CULTURE/COLONY COUNT: CPT

## 2017-05-28 PROCEDURE — 96365 THER/PROPH/DIAG IV INF INIT: CPT

## 2017-05-28 PROCEDURE — 93005 ELECTROCARDIOGRAM TRACING: CPT

## 2017-05-28 PROCEDURE — 63600175 PHARM REV CODE 636 W HCPCS: Performed by: HOSPITALIST

## 2017-05-28 PROCEDURE — 96375 TX/PRO/DX INJ NEW DRUG ADDON: CPT

## 2017-05-28 PROCEDURE — 93010 ELECTROCARDIOGRAM REPORT: CPT | Mod: ,,, | Performed by: INTERNAL MEDICINE

## 2017-05-28 PROCEDURE — 27000221 HC OXYGEN, UP TO 24 HOURS

## 2017-05-28 PROCEDURE — 87088 URINE BACTERIA CULTURE: CPT

## 2017-05-28 PROCEDURE — 87186 SC STD MICRODIL/AGAR DIL: CPT

## 2017-05-28 RX ORDER — LEVOFLOXACIN 500 MG/1
500 TABLET, FILM COATED ORAL DAILY
Qty: 3 TABLET | Refills: 0 | Status: SHIPPED | OUTPATIENT
Start: 2017-05-28 | End: 2017-05-31

## 2017-05-28 RX ADMIN — ASPIRIN 81 MG: 81 TABLET, CHEWABLE ORAL at 10:05

## 2017-05-28 RX ADMIN — NITROGLYCERIN 0.4 MG: 0.4 TABLET SUBLINGUAL at 02:05

## 2017-05-28 RX ADMIN — LOSARTAN POTASSIUM 100 MG: 50 TABLET, FILM COATED ORAL at 10:05

## 2017-05-28 RX ADMIN — ACETAMINOPHEN 650 MG: 325 TABLET ORAL at 02:05

## 2017-05-28 RX ADMIN — LEVOTHYROXINE SODIUM 125 MCG: 0.12 TABLET ORAL at 06:05

## 2017-05-28 RX ADMIN — PANTOPRAZOLE SODIUM 40 MG: 40 TABLET, DELAYED RELEASE ORAL at 10:05

## 2017-05-28 RX ADMIN — CEFTRIAXONE 1 G: 1 INJECTION, SOLUTION INTRAVENOUS at 02:05

## 2017-05-28 RX ADMIN — NITROGLYCERIN 0.4 MG: 0.4 TABLET SUBLINGUAL at 01:05

## 2017-05-28 RX ADMIN — CITALOPRAM HYDROBROMIDE 20 MG: 20 TABLET ORAL at 10:05

## 2017-05-28 RX ADMIN — METOPROLOL SUCCINATE 50 MG: 50 TABLET, EXTENDED RELEASE ORAL at 10:05

## 2017-05-28 NOTE — CONSULTS
"[facility] -   Cardiology  Consult Note    Patient Name: Delores M Cryer  MRN: [id number]  Admission Date: 5/27/2017  Hospital Length of Stay: 0 days  Code Status: Full Code   Attending Provider: Amanda Ramachandran MD   Consulting Provider: Delonte Russell MD  Primary Care Physician: Viviana Ervin MD  Principal Problem:Chest pain    Patient information was obtained from patient and ER records.     Consults  Subjective:     Chief Complaint:  Chest pain     HPI: M Cryer is a 81 y.o. female patient, PMH HTN and CAD s/p [facility] nonobstructive cad in . Normal EF.  Presented to the Emergency Department for CP which onset gradually today. Symptoms are constant and moderate in severity. No mitigating or exacerbating factors reported. Pt reports having "just an overall bad feeling." No associated sxs reported. She and her  states that she sometime forgot to take her medications. And she admits that she takes a lot of salt at home.  In ER, /105 mmHg. Troponin negative x2,   Now, pain free    Past Medical History:   Diagnosis Date    Atrial fibrillation     Coronary artery disease     Elevated cholesterol     Hypertension     Thyroid condition        Past Surgical History:   Procedure Laterality Date    APPENDECTOMY      BACK SURGERY      x2    CARDIAC CATHETERIZATION  2016    CATARACT EXTRACTION      CHOLECYSTECTOMY      CORONARY ANGIOPLASTY  09/2015    non-obstructive CAD    EYE SURGERY      HIP SURGERY Bilateral     HYSTERECTOMY      KNEE SURGERY Bilateral     x 2 each       Review of patient's allergies indicates:   Allergen Reactions    Alendronate      Other reaction(s): Joint pain  Other reaction(s): Joint pain    Nitrofurantoin macrocrystalline      Other reaction(s): Unknown    Sulfa (sulfonamide antibiotics)      Other reaction(s): Shortness of breath  Other reaction(s): Hives  Other reaction(s): Flushing (skin)  Other reaction(s): Edema  Other reaction(s): Shortness " of breath  Other reaction(s): Hives  Other reaction(s): Flushing (skin)  Other reaction(s): Edema    Warfarin      Other reaction(s): excessive bleeding       No current facility-administered medications on file prior to encounter.      Current Outpatient Prescriptions on File Prior to Encounter   Medication Sig    atorvastatin (LIPITOR) 20 MG tablet Take 1 tablet (20 mg total) by mouth every evening.    citalopram (CELEXA) 20 MG tablet Take 1 tablet (20 mg total) by mouth once daily.    levothyroxine (SYNTHROID) 125 MCG tablet Take 1 tablet (125 mcg total) by mouth once daily.    losartan (COZAAR) 100 MG tablet Take 1 tablet (100 mg total) by mouth once daily.    metoprolol succinate (TOPROL-XL) 50 MG 24 hr tablet take 1 and 1/2 tablets by mouth twice a day    omeprazole (PRILOSEC) 40 MG capsule Take 1 capsule (40 mg total) by mouth once daily.    albuterol 90 mcg/actuation inhaler Inhale 2 puffs into the lungs every 6 (six) hours as needed for Wheezing.    aspirin 81 mg Tab Take 1 tablet by mouth Daily.    calcium carbonate-vit D3-min (CALTRATE 600+D PLUS MINERALS) 600 mg (1,500 mg)-400 unit Chew Take 1 tablet by mouth Twice daily.    fluticasone (FLONASE) 50 mcg/actuation nasal spray 2 sprays by Each Nare route once daily.    guaifenesin (MUCINEX) 600 mg 12 hr tablet Take 2 tablets (1,200 mg total) by mouth 2 (two) times daily.    MULTIVITAMIN W-MINERALS/LUTEIN (CENTRUM SILVER ORAL) Take 1 tablet by mouth.    nitroGLYCERIN (NITROSTAT) 0.4 MG SL tablet Place 1 tablet under the tongue. Every 5 min - up to 3    potassium chloride SA (KLOR-CON M20) 20 MEQ tablet Take 1 tablet (20 mEq total) by mouth 2 (two) times daily.     Family History     Problem Relation (Age of Onset)    Cataracts Mother    Diabetes Sister, Maternal Grandmother    Glaucoma Mother        Social History Main Topics    Smoking status: Former Smoker     Packs/day: 1.00     Years: 30.00     Types: Cigarettes     Quit date: 1/1/1983     Smokeless tobacco: Never Used    Alcohol use No    Drug use: No    Sexual activity: Not on file     Review of Systems   Constitution: Negative for decreased appetite, diaphoresis, fever, weakness, malaise/fatigue and night sweats.   HENT: Negative for headaches and nosebleeds.    Eyes: Negative for blurred vision and double vision.   Cardiovascular: Positive for chest pain. Negative for claudication, dyspnea on exertion, irregular heartbeat, leg swelling, near-syncope, orthopnea, palpitations, paroxysmal nocturnal dyspnea and syncope.   Respiratory: Negative for cough, shortness of breath, sleep disturbances due to breathing, snoring, sputum production and wheezing.    Endocrine: Negative for cold intolerance and polyuria.   Hematologic/Lymphatic: Does not bruise/bleed easily.   Skin: Negative for rash.   Musculoskeletal: Negative for back pain, falls, joint pain, joint swelling and neck pain.   Gastrointestinal: Negative for abdominal pain, heartburn, nausea and vomiting.   Genitourinary: Negative for dysuria, frequency and hematuria.   Neurological: Positive for dizziness. Negative for difficulty with concentration, focal weakness, light-headedness, numbness and seizures.   Psychiatric/Behavioral: Negative for depression, memory loss and substance abuse. The patient does not have insomnia.    Allergic/Immunologic: Negative for HIV exposure and hives.     Objective:     Vital Signs (Most Recent):  Temp: 97.9 °F (36.6 °C) (05/28/17 0722)  Pulse: 63 (05/28/17 0722)  Resp: 18 (05/28/17 0722)  BP: (!) 157/72 (05/28/17 0722)  SpO2: 96 % (05/28/17 0800) Vital Signs (24h Range):  Temp:  [97.5 °F (36.4 °C)-98.4 °F (36.9 °C)] 97.9 °F (36.6 °C)  Pulse:  [63-98] 63  Resp:  [16-31] 18  SpO2:  [93 %-97 %] 96 %  BP: (154-222)/() 157/72     Weight: 89.2 kg (196 lb 11.2 oz)  Body mass index is 33.76 kg/m².    SpO2: 96 %  O2 Device (Oxygen Therapy): nasal cannula    No intake or output data in the 24 hours ending  05/28/17 1051    Lines/Drains/Airways     Peripheral Intravenous Line                 Peripheral IV - Single Lumen 05/27/17 2105 Right Antecubital less than 1 day                Physical Exam   Constitutional: She is oriented to person, place, and time. She appears well-nourished.   HENT:   Head: Normocephalic.   Eyes: Pupils are equal, round, and reactive to light.   Neck: Normal carotid pulses and no JVD present. Carotid bruit is not present. No thyromegaly present.   Cardiovascular: Normal rate, regular rhythm, normal heart sounds and normal pulses.   No extrasystoles are present. PMI is not displaced.  Exam reveals no gallop and no S3.    No murmur heard.  Pulmonary/Chest: Breath sounds normal. No stridor. No respiratory distress.   Abdominal: Soft. Bowel sounds are normal. There is no tenderness. There is no rebound.   Musculoskeletal: Normal range of motion.   Neurological: She is alert and oriented to person, place, and time.   Skin: Skin is intact. No rash noted.   Psychiatric: Her behavior is normal.       Significant Labs:   ABG: No results for input(s): PH, PCO2, HCO3, POCSATURATED, BE in the last 48 hours., Blood Culture: No results for input(s): LABBLOO in the last 48 hours., BMP:   Recent Labs  Lab 05/27/17 2105         K 5.7*      CO2 25   BUN 18   CREATININE 0.9   CALCIUM 9.5   , CMP   Recent Labs  Lab 05/27/17 2105      K 5.7*      CO2 25      BUN 18   CREATININE 0.9   CALCIUM 9.5   PROT 7.4   ALBUMIN 3.6   BILITOT 1.0   ALKPHOS 108   AST 28   ALT 12   ANIONGAP 8   ESTGFRAFRICA >60   EGFRNONAA >60   , CBC   Recent Labs  Lab 05/27/17 2105   WBC 10.36   HGB 14.7   HCT 43.0      , INR No results for input(s): INR, PROTIME in the last 48 hours., Lipid Panel No results for input(s): CHOL, HDL, LDLCALC, TRIG, CHOLHDL in the last 48 hours. and Troponin   Recent Labs  Lab 05/27/17 2105 05/28/17  0243   TROPONINI 0.023 0.023       Significant Imaging: X-Ray:  CXR: X-Ray Chest 1 View (CXR): No results found for this visit on 05/27/17.  Assessment and Plan:       HTN urgency   Chest pain due to uncontrolled HTN  Non-compliance with medications and low sodium diet    PLan: continue BB and losartan  DASH    Active Diagnoses:    Diagnosis Date Noted POA    PRINCIPAL PROBLEM:  Chest pain [R07.9] 06/01/2015 Yes    Hypertensive urgency [I16.0] 05/27/2017 Yes    Acquired hypothyroidism [E03.9] 11/01/2014 Yes     Chronic    Hyperlipidemia [E78.5] 06/11/2013 Yes     Chronic    Atrial fibrillation [I48.91] 06/11/2013 Yes     Chronic      Problems Resolved During this Admission:    Diagnosis Date Noted Date Resolved POA       VTE Risk Mitigation         Ordered     enoxaparin injection 40 mg  Daily     Route:  Subcutaneous        05/27/17 2342     Low Risk of VTE  Once      05/27/17 2339          Thank you for your consult. I will follow-up with patient. Please contact us if you have any additional questions.    Delonte Russell MD  Cardiology   [facility]

## 2017-05-28 NOTE — ED NOTES
Report received from Nina JOE. NAD noted. AAO x 3. RR e/u, airway open and patent. Pt remains attached to cardiac monitor. Pt reports slight improvement in CP. Nitropatch noted to left breast. SOB denied. Family at bedside. Will continue to monitor.

## 2017-05-28 NOTE — PROGRESS NOTES
"[place] [facility] [place] verbalized mid-sternal CP that she says came on suddenly; pt describes pain as "heaviness" and rates 5/10; however, judging from pt's current expression and grimaces pain appears to be more closer to 8; pt also verbalized some SOB currently; VS taken and noted per flowsheets; 2L/NC applied and Nitro SL admin; post first Nitro pt verbalized that pain now approximately 2.5 on pain scale; still describes pain as "heaviness to mid-sternal chest"; 2nd Nitro SL admin and post admin pt rates pain now 3; 3rd Nitro given and post admin pain 1 on pain scale; MD Ramachandran notified and new orders on chart for stat EKG; also notified MD of UA results and additional orders placed for treatment; will continue to monitor  "

## 2017-05-28 NOTE — HOSPITAL COURSE
The patients chest pain resolved as her blood pressure improved. The patient denies any current chest pain. Troponin was negative. The case was discussed with Dr. Russell who felt that the patients chest pain was due to her uncontrolled blood pressure. The patient admits to not always being compliant with her home medications. The patient also admits to a having a high sodium intake. The patient was strongly counseled on medication compliance and a low sodium diet. The patient was also found to have a UTI on admission and is being discharged home on a coarse of levaquin. The patient will follow up with her PCP.

## 2017-05-28 NOTE — ASSESSMENT & PLAN NOTE
History of non-obstructing CAD; admit for observation; trend troponin; most likely related to uncontrolled blood pressure - currently chest pain free; continue Aspirin/Toprol XL

## 2017-05-28 NOTE — ED PROVIDER NOTES
"SCRIBE #1 NOTE: I, Ivan Hassan, am scribing for, and in the presence of, Christie Garcia MD. I have scribed the entire note.      History      Chief Complaint   Patient presents with    Chest Pain     CP that began today and worsened about 1 hour ago       Review of patient's allergies indicates:   Allergen Reactions    Alendronate      Other reaction(s): Joint pain  Other reaction(s): Joint pain    Nitrofurantoin macrocrystalline      Other reaction(s): Unknown    Sulfa (sulfonamide antibiotics)      Other reaction(s): Shortness of breath  Other reaction(s): Hives  Other reaction(s): Flushing (skin)  Other reaction(s): Edema  Other reaction(s): Shortness of breath  Other reaction(s): Hives  Other reaction(s): Flushing (skin)  Other reaction(s): Edema    Warfarin      Other reaction(s): excessive bleeding        HPI   HPI    5/27/2017, 9:09 PM   History obtained from the patient      History of Present Illness: Delores M Cryer is a 81 y.o. female patient who presents to the Emergency Department for CP which onset gradually today. Symptoms are constant and moderate in severity. No mitigating or exacerbating factors reported. Pt reports having "just an overall bad feeling." No associated sxs reported. Patient denies any fever, chills, congestion, cough, dizziness, n/v, SOB, diaphoresis, palpitations, extremity weakness/numbness, leg swelling, and all other sxs at this time. Prior Tx includes Nitroglycerin. No further complaints or concerns at this time.         Arrival mode: Personal vehicle    PCP: Viviana Ervin MD       Past Medical History:  Past Medical History:   Diagnosis Date    Atrial fibrillation     Coronary artery disease     Elevated cholesterol     Hypertension     Thyroid condition        Past Surgical History:  Past Surgical History:   Procedure Laterality Date    APPENDECTOMY      BACK SURGERY      x2    CARDIAC CATHETERIZATION  2016    CATARACT EXTRACTION      CHOLECYSTECTOMY   "    CORONARY ANGIOPLASTY  09/2015    non-obstructive CAD    EYE SURGERY      HIP SURGERY Bilateral     HYSTERECTOMY      KNEE SURGERY Bilateral     x 2 each         Family History:  Family History   Problem Relation Age of Onset    Cataracts Mother     Glaucoma Mother     Diabetes Sister     Diabetes Maternal Grandmother        Social History:  Social History     Social History Main Topics    Smoking status: Former Smoker     Packs/day: 1.00     Years: 30.00     Types: Cigarettes     Quit date: 1/1/1983    Smokeless tobacco: Never Used    Alcohol use No    Drug use: No    Sexual activity: Unknown       ROS   Review of Systems   Constitutional: Negative for chills, diaphoresis and fever.   HENT: Negative for congestion and sore throat.    Respiratory: Negative for cough and shortness of breath.    Cardiovascular: Positive for chest pain. Negative for palpitations and leg swelling.   Gastrointestinal: Negative for nausea and vomiting.   Genitourinary: Negative for dysuria.   Musculoskeletal: Negative for back pain.   Skin: Negative for rash.   Neurological: Negative for dizziness and weakness.   Hematological: Does not bruise/bleed easily.   All other systems reviewed and are negative.      Physical Exam      Initial Vitals [05/27/17 2053]   BP Pulse Resp Temp SpO2   -- 88 18 98.4 °F (36.9 °C) (!) 93 %      Physical Exam  Nursing Notes and Vital Signs Reviewed.  Constitutional: Patient is in no acute distress. Well-developed and well-nourished.   Head: Atraumatic. Normocephalic.  Eyes: PERRL. EOM intact. Conjunctivae are not pale. No scleral icterus.  ENT: Mucous membranes are moist. Oropharynx is clear and symmetric.    Neck: Supple. Full ROM. No lymphadenopathy.  Cardiovascular: Irregular regular rate. Regular rhythm. No murmurs, rubs, or gallops. Distal pulses are 1+ and symmetric.  Pulmonary/Chest: No respiratory distress. Clear to auscultation bilaterally. No wheezing, rales, or rhonchi.  Abdominal:  "Soft and non-distended.  There is no tenderness.  No rebound, guarding, or rigidity. Good bowel sounds.  Genitourinary: No CVA tenderness  Musculoskeletal: Moves all extremities. No obvious deformities. No edema. No calf tenderness.  Skin: Warm and dry.  Neurological:  Alert, awake, and appropriate.  Normal speech.  No acute focal neurological deficits are appreciated.  Psychiatric: Normal affect. Good eye contact. Appropriate in content.    ED Course    Procedures  ED Vital Signs:  Vitals:    05/27/17 2053 05/27/17 2101 05/27/17 2105 05/27/17 2115   BP:   (!) 222/105 (!) 185/88   Pulse: 88 87 94 83   Resp: 18  (!) 31 (!) 24   Temp: 98.4 °F (36.9 °C)      TempSrc: Oral      SpO2: (!) 93%  96% 96%   Weight: 88 kg (194 lb)      Height: 5' 4" (1.626 m)       05/27/17 2132 05/27/17 2159 05/27/17 2302 05/27/17 2330   BP: (!) 178/89 (!) 183/90 (!) 163/87    Pulse: 83 86 83    Resp:   (!) 24    Temp:       TempSrc:       SpO2: 95% 95% (!) 93%    Weight:    89.2 kg (196 lb 11.2 oz)   Height:        05/27/17 2348 05/28/17 0156 05/28/17 0202 05/28/17 0211   BP: (!) 169/81 (!) 164/94 (!) 154/94 (!) 161/75   Pulse: 86 93 98 92   Resp: (!) 24 (!) 22 18 18   Temp: 97.7 °F (36.5 °C)      TempSrc: Oral      SpO2: 96% 97% 95% (!) 94%   Weight:       Height:        05/28/17 0225 05/28/17 0400   BP: (!) 156/84 (!) 156/90   Pulse: 80 83   Resp: 20 16   Temp:  97.5 °F (36.4 °C)   TempSrc:     SpO2: 96% 95%   Weight:     Height:         Abnormal Lab Results:  Labs Reviewed   CBC W/ AUTO DIFFERENTIAL - Abnormal; Notable for the following:        Result Value    Mono # 1.2 (*)     Lymph% 17.2 (*)     All other components within normal limits   COMPREHENSIVE METABOLIC PANEL - Abnormal; Notable for the following:     Potassium 5.7 (*)     All other components within normal limits   B-TYPE NATRIURETIC PEPTIDE - Abnormal; Notable for the following:      (*)     All other components within normal limits   TROPONIN I        All Lab " Results:  Results for orders placed or performed during the hospital encounter of 05/27/17   CBC auto differential   Result Value Ref Range    WBC 10.36 3.90 - 12.70 K/uL    RBC 4.77 4.00 - 5.40 M/uL    Hemoglobin 14.7 12.0 - 16.0 g/dL    Hematocrit 43.0 37.0 - 48.5 %    MCV 90 82 - 98 fL    MCH 30.8 27.0 - 31.0 pg    MCHC 34.2 32.0 - 36.0 %    RDW 13.3 11.5 - 14.5 %    Platelets 165 150 - 350 K/uL    MPV 10.9 9.2 - 12.9 fL    Gran # 7.3 1.8 - 7.7 K/uL    Lymph # 1.8 1.0 - 4.8 K/uL    Mono # 1.2 (H) 0.3 - 1.0 K/uL    Eos # 0.1 0.0 - 0.5 K/uL    Baso # 0.02 0.00 - 0.20 K/uL    Gran% 69.9 38.0 - 73.0 %    Lymph% 17.2 (L) 18.0 - 48.0 %    Mono% 11.3 4.0 - 15.0 %    Eosinophil% 1.4 0.0 - 8.0 %    Basophil% 0.2 0.0 - 1.9 %    Differential Method Automated    Comprehensive metabolic panel   Result Value Ref Range    Sodium 140 136 - 145 mmol/L    Potassium 5.7 (H) 3.5 - 5.1 mmol/L    Chloride 107 95 - 110 mmol/L    CO2 25 23 - 29 mmol/L    Glucose 106 70 - 110 mg/dL    BUN, Bld 18 8 - 23 mg/dL    Creatinine 0.9 0.5 - 1.4 mg/dL    Calcium 9.5 8.7 - 10.5 mg/dL    Total Protein 7.4 6.0 - 8.4 g/dL    Albumin 3.6 3.5 - 5.2 g/dL    Total Bilirubin 1.0 0.1 - 1.0 mg/dL    Alkaline Phosphatase 108 55 - 135 U/L    AST 28 10 - 40 U/L    ALT 12 10 - 44 U/L    Anion Gap 8 8 - 16 mmol/L    eGFR if African American >60 >60 mL/min/1.73 m^2    eGFR if non African American >60 >60 mL/min/1.73 m^2   Troponin I #1   Result Value Ref Range    Troponin I 0.023 0.000 - 0.026 ng/mL   B-Type natriuretic peptide (BNP)   Result Value Ref Range     (H) 0 - 99 pg/mL   Troponin I #2   Result Value Ref Range    Troponin I 0.023 0.000 - 0.026 ng/mL   Urinalysis   Result Value Ref Range    Specimen UA Urine, Clean Catch     Color, UA Yellow Yellow, Straw, Angella    Appearance, UA Hazy (A) Clear    pH, UA 7.0 5.0 - 8.0    Specific Gravity, UA 1.015 1.005 - 1.030    Protein, UA Negative Negative    Glucose, UA Negative Negative    Ketones, UA  Negative Negative    Bilirubin (UA) Negative Negative    Occult Blood UA Trace (A) Negative    Nitrite, UA Negative Negative    Urobilinogen, UA Negative <2.0 EU/dL    Leukocytes, UA Trace (A) Negative   Urinalysis Microscopic   Result Value Ref Range    RBC, UA 5 (H) 0 - 4 /hpf    WBC, UA 5 0 - 5 /hpf    Bacteria, UA Many (A) None-Occ /hpf    Microscopic Comment SEE COMMENT          Imaging Results:  Imaging Results          X-Ray Chest AP Portable (Final result)  Result time 05/27/17 21:56:20    Final result by rPabhu Andrew MD (05/27/17 21:56:20)                 Impression:     Cardiomegaly with clear lungs      Electronically signed by: PRABHU ANDREW MD  Date:     05/27/17  Time:    21:56              Narrative:    Portable chest    Clinical indication: Acute chest pain    Findings: The heart is enlarged.  There chronic interstitial fibrotic changes of the lung fields but no consolidation.  No change from 06/06/2016                             The EKG was ordered, reviewed, and independently interpreted by the ED provider.  Interpretation time: 2101  Rate: 87 BPM  Rhythm: atrial fibrillation  Interpretation: Minimal voltage criteria for LVH, maybe normal variant. Septal infract. No STEMI.         The Emergency Provider reviewed the vital signs and test results, which are outlined above.    ED Discussion     11:21 PM: Discussed case with Dr. Ramachandran ([facility]). Dr. Ramachandran agrees with current care and management of pt and accepts admission.   Admitting Service: Hospital medicine   Admitting Physician: Dr. Ramachandran  Admit to: Tele    11:21 PM: Re-evaluated pt. I have discussed test results, shared treatment plan, and the need for admission with patient and family at bedside. Pt and family express understanding at this time and agree with all information. All questions answered. Pt and family have no further questions or concerns at this time. Pt is ready for admit.        ED Medication(s):  Medications    hydrALAZINE injection 10 mg (10 mg Intravenous Given 5/27/17 8446)   aspirin chewable tablet 81 mg (not administered)   atorvastatin tablet 20 mg (not administered)   citalopram tablet 20 mg (not administered)   levothyroxine tablet 125 mcg (not administered)   losartan tablet 100 mg (not administered)   metoprolol succinate (TOPROL-XL) 24 hr tablet 50 mg (not administered)   pantoprazole EC tablet 40 mg (not administered)   potassium chloride SA CR tablet 20 mEq (not administered)   nitroGLYCERIN SL tablet 0.4 mg (0.4 mg Sublingual Given 5/28/17 0218)   acetaminophen tablet 650 mg (650 mg Oral Given 5/28/17 0235)   ondansetron injection 4 mg (not administered)   enoxaparin injection 40 mg (not administered)   cefTRIAXone (ROCEPHIN) 1 g in dextrose 5 % 50 mL IVPB (1 g Intravenous New Bag 5/28/17 0254)   aspirin tablet 325 mg (325 mg Oral Given 5/27/17 2118)   nitroGLYCERIN 2% TD oint ointment 1 inch (1 inch Topical Given 5/27/17 2119)       Current Discharge Medication List                Medical Decision Making    Medical Decision Making:   Clinical Tests:   Lab Tests: Ordered and Reviewed  Radiological Study: Ordered and Reviewed  Medical Tests: Ordered and Reviewed           Scribe Attestation:   Scribe #1: I performed the above scribed service and the documentation accurately describes the services I performed. I attest to the accuracy of the note.    Attending:   Physician Attestation Statement for Scribe #1: I, Christie Garcia MD, personally performed the services described in this documentation, as scribed by Ivan Hassan, in my presence, and it is both accurate and complete.          Clinical Impression       ICD-10-CM ICD-9-CM   1. Chest pain, unspecified type R07.9 786.50   2. Chest pain R07.9 786.50       Disposition:   Disposition: Admitted  Condition: Fair         Christie Garcia MD  05/28/17 8028

## 2017-05-28 NOTE — PROGRESS NOTES
Patient now having polyuria.  UA shows many bacteria and trace leukocyte esterase.  Will start on Rocephin and check urine culture.  Also complaining of chest pain again - repeat troponin and EKG now.

## 2017-05-28 NOTE — SUBJECTIVE & OBJECTIVE
Past Medical History:   Diagnosis Date    Atrial fibrillation     Coronary artery disease     Elevated cholesterol     Hypertension     Thyroid condition        Past Surgical History:   Procedure Laterality Date    APPENDECTOMY      BACK SURGERY      x2    CARDIAC CATHETERIZATION  2016    CATARACT EXTRACTION      CHOLECYSTECTOMY      CORONARY ANGIOPLASTY  09/2015    non-obstructive CAD    EYE SURGERY      HIP SURGERY Bilateral     HYSTERECTOMY      KNEE SURGERY Bilateral     x 2 each       Review of patient's allergies indicates:   Allergen Reactions    Alendronate      Other reaction(s): Joint pain  Other reaction(s): Joint pain    Nitrofurantoin macrocrystalline      Other reaction(s): Unknown    Sulfa (sulfonamide antibiotics)      Other reaction(s): Shortness of breath  Other reaction(s): Hives  Other reaction(s): Flushing (skin)  Other reaction(s): Edema  Other reaction(s): Shortness of breath  Other reaction(s): Hives  Other reaction(s): Flushing (skin)  Other reaction(s): Edema    Warfarin      Other reaction(s): excessive bleeding       No current facility-administered medications on file prior to encounter.      Current Outpatient Prescriptions on File Prior to Encounter   Medication Sig    atorvastatin (LIPITOR) 20 MG tablet Take 1 tablet (20 mg total) by mouth every evening.    citalopram (CELEXA) 20 MG tablet Take 1 tablet (20 mg total) by mouth once daily.    levothyroxine (SYNTHROID) 125 MCG tablet Take 1 tablet (125 mcg total) by mouth once daily.    losartan (COZAAR) 100 MG tablet Take 1 tablet (100 mg total) by mouth once daily.    metoprolol succinate (TOPROL-XL) 50 MG 24 hr tablet take 1 and 1/2 tablets by mouth twice a day    omeprazole (PRILOSEC) 40 MG capsule Take 1 capsule (40 mg total) by mouth once daily.    albuterol 90 mcg/actuation inhaler Inhale 2 puffs into the lungs every 6 (six) hours as needed for Wheezing.    aspirin 81 mg Tab Take 1 tablet by mouth Daily.     calcium carbonate-vit D3-min (CALTRATE 600+D PLUS MINERALS) 600 mg (1,500 mg)-400 unit Chew Take 1 tablet by mouth Twice daily.    fluticasone (FLONASE) 50 mcg/actuation nasal spray 2 sprays by Each Nare route once daily.    guaifenesin (MUCINEX) 600 mg 12 hr tablet Take 2 tablets (1,200 mg total) by mouth 2 (two) times daily.    MULTIVITAMIN W-MINERALS/LUTEIN (CENTRUM SILVER ORAL) Take 1 tablet by mouth.    nitroGLYCERIN (NITROSTAT) 0.4 MG SL tablet Place 1 tablet under the tongue. Every 5 min - up to 3    potassium chloride SA (KLOR-CON M20) 20 MEQ tablet Take 1 tablet (20 mEq total) by mouth 2 (two) times daily.     Family History     Problem Relation (Age of Onset)    Cataracts Mother    Diabetes Sister, Maternal Grandmother    Glaucoma Mother        Social History Main Topics    Smoking status: Former Smoker     Packs/day: 1.00     Years: 30.00     Types: Cigarettes     Quit date: 1/1/1983    Smokeless tobacco: Never Used    Alcohol use No    Drug use: No    Sexual activity: Not on file     Review of Systems   Complete 14 point review of systems done and negative except per HPI  Objective:     Vital Signs (Most Recent):  Temp: 98.4 °F (36.9 °C) (05/27/17 2053)  Pulse: 83 (05/27/17 2302)  Resp: (!) 24 (05/27/17 2302)  BP: (!) 163/87 (05/27/17 2302)  SpO2: (!) 93 % (05/27/17 2302) Vital Signs (24h Range):  Temp:  [98.4 °F (36.9 °C)] 98.4 °F (36.9 °C)  Pulse:  [83-94] 83  Resp:  [18-31] 24  SpO2:  [93 %-96 %] 93 %  BP: (163-222)/() 163/87     Weight: 88 kg (194 lb)  Body mass index is 33.3 kg/m².    Physical Exam   Constitutional: She is oriented to person, place, and time. She appears well-developed and well-nourished. No distress.   HENT:   Head: Normocephalic and atraumatic.   Eyes: Conjunctivae are normal. Pupils are equal, round, and reactive to light. No scleral icterus.   Neck: Normal range of motion. Neck supple. No JVD present.   Cardiovascular: Normal rate.    Irregular rhythm     Pulmonary/Chest: Effort normal and breath sounds normal.   Abdominal: Soft. Bowel sounds are normal. She exhibits no distension. There is no tenderness.   Genitourinary:   Genitourinary Comments: Deferred    Musculoskeletal: Normal range of motion. She exhibits no edema.   Neurological: She is alert and oriented to person, place, and time.   No gross focal motor deficits    Skin: Skin is warm and dry. Capillary refill takes less than 2 seconds.   Psychiatric: She has a normal mood and affect. Her behavior is normal.        Significant Labs:   Recent Lab Results       05/27/17  2105      Albumin 3.6     Alkaline Phosphatase 108     ALT 12     Anion Gap 8     AST 28     Baso # 0.02     Basophil% 0.2     Total Bilirubin 1.0  Comment:  For infants and newborns, interpretation of results should be based  on gestational age, weight and in agreement with clinical  observations.  Premature Infant recommended reference ranges:  Up to 24 hours.............<8.0 mg/dL  Up to 48 hours............<12.0 mg/dL  3-5 days..................<15.0 mg/dL  6-29 days.................<15.0 mg/dL         Comment:  Values of less than 100 pg/ml are consistent with non-CHF populations.(H)     BUN, Bld 18     Calcium 9.5     Chloride 107     CO2 25     Creatinine 0.9     Differential Method Automated     eGFR if  >60     eGFR if non  >60  Comment:  Calculation used to obtain the estimated glomerular filtration  rate (eGFR) is the CKD-EPI equation. Since race is unknown   in our information system, the eGFR values for   -American and Non--American patients are given   for each creatinine result.       Eos # 0.1     Eosinophil% 1.4     Glucose 106     Gran # 7.3     Gran% 69.9     Hematocrit 43.0     Hemoglobin 14.7     Lymph # 1.8     Lymph% 17.2(L)     MCH 30.8     MCHC 34.2     MCV 90     Mono # 1.2(H)     Mono% 11.3     MPV 10.9     Platelets 165     Potassium 5.7(H)     Total Protein 7.4      RBC 4.77     RDW 13.3     Sodium 140     Troponin I 0.023  Comment:  The reference interval for Troponin I represents the 99th percentile   cutoff   for our facility and is consistent with 3rd generation assay   performance.       WBC 10.36           Significant Imaging: CXR - cardiomegaly with clear lungs     EKG - atrial fibrillation

## 2017-05-28 NOTE — DISCHARGE SUMMARY
[facility] Hospital Medicine  Discharge Summary      Patient Name: Delores M Cryer  MRN: [id number]  Admission Date: 5/27/2017  Hospital Length of Stay: 0 days  Discharge Date and Time: 5/28/2017 12:54 PM  Attending Physician: No att. providers found   Discharging Provider: Faustino Strickland NP  Primary Care Provider: Viviana Ervin MD      HPI:   81 year old female with h/o CAD (had non-obstructive CAD in 9/2015), HTN, Afib and hypothyroidism presents complaining of intermittent chest pain for the past day.  Pain was substernal, she cannot describe the pain to me, just said that it was hurting.  She is not sure if it was associated with exertion.  It was non-radiating.  Lasting a few minutes at a time.  No exacerbating or relieving factors.  Currently she is chest pain free.  Blood pressure was uncontrolled on arrival with SBP greater than 200.  She is not sure if she took her blood pressure medicines today.  Says she was short of breath earlier but not at this time.    * No surgery found *      Indwelling Lines/Drains at time of discharge:   Lines/Drains/Airways          No matching active lines, drains, or airways        Hospital Course:   The patients chest pain resolved as her blood pressure improved. The patient denies any current chest pain. Troponin was negative. The case was discussed with Dr. Russell who felt that the patients chest pain was due to her uncontrolled blood pressure. The patient admits to not always being compliant with her home medications. The patient also admits to a having a high sodium intake. The patient was strongly counseled on medication compliance and a low sodium diet. The patient was also found to have a UTI on admission and is being discharged home on a coarse of levaquin. The patient will follow up with her PCP.      Consults:   Consults         Status Ordering Provider     Inpatient consult to Cardiology  Once     Provider:  MD Juanpablo Velasquez  AMANDA     Inpatient consult to Hospitalist  Once     Provider:  Amanda Ramachandran MD    Acknowledged ZHENG CABRERA          Significant Diagnostic Studies:   Imaging Results          X-Ray Chest AP Portable (Final result)  Result time 05/27/17 21:56:20    Final result by Leda Lundberg MD (05/27/17 21:56:20)                 Impression:     Cardiomegaly with clear lungs      Electronically signed by: LEDA LUNDBERG MD  Date:     05/27/17  Time:    21:56              Narrative:    Portable chest    Clinical indication: Acute chest pain    Findings: The heart is enlarged.  There chronic interstitial fibrotic changes of the lung fields but no consolidation.  No change from 06/06/2016                                 Pending Diagnostic Studies:     None        Final Active Diagnoses:    Diagnosis Date Noted POA    Acquired hypothyroidism [E03.9] 11/01/2014 Yes     Chronic    Hyperlipidemia [E78.5] 06/11/2013 Yes     Chronic    Atrial fibrillation [I48.91] 06/11/2013 Yes     Chronic      Problems Resolved During this Admission:    Diagnosis Date Noted Date Resolved POA    PRINCIPAL PROBLEM:  Chest pain [R07.9] 06/01/2015 05/28/2017 Yes    Hypertensive urgency [I16.0] 05/27/2017 05/28/2017 Yes      No new Assessment & Plan notes have been filed under this hospital service since the last note was generated.  Service: Hospital Medicine      Discharged Condition: stable    Disposition: Home or Self Care    Follow Up:  Follow-up Information     Viviana Ervin MD. Schedule an appointment as soon as possible for a visit in 3 days.    Specialty:  Family Medicine  Contact information:  [address] [place] [id number] [phone]                 Patient Instructions:     Diet Cardiac     Diet general       Medications:  Reconciled Home Medications:   Discharge Medication List as of 5/28/2017 12:36 PM      START taking these medications    Details   levoFLOXacin (LEVAQUIN) 500 MG tablet Take 1 tablet (500  mg total) by mouth once daily., Starting Sun 5/28/2017, Until Wed 5/31/2017, Normal         CONTINUE these medications which have NOT CHANGED    Details   albuterol 90 mcg/actuation inhaler Inhale 2 puffs into the lungs every 6 (six) hours as needed for Wheezing., Starting 6/6/2016, Until Discontinued, Normal      aspirin 81 mg Tab Take 1 tablet by mouth Daily., Starting 5/12/2012, Until Discontinued, Historical Med      atorvastatin (LIPITOR) 20 MG tablet Take 1 tablet (20 mg total) by mouth every evening., Starting 4/24/2017, Until Discontinued, Normal      calcium carbonate-vit D3-min (CALTRATE 600+D PLUS MINERALS) 600 mg (1,500 mg)-400 unit Chew Take 1 tablet by mouth Twice daily., Starting 5/12/2012, Until Discontinued, Historical Med      citalopram (CELEXA) 20 MG tablet Take 1 tablet (20 mg total) by mouth once daily., Starting 4/24/2017, Until Discontinued, Normal      fluticasone (FLONASE) 50 mcg/actuation nasal spray 2 sprays by Each Nare route once daily., Starting 4/24/2017, Until Discontinued, Normal      guaifenesin (MUCINEX) 600 mg 12 hr tablet Take 2 tablets (1,200 mg total) by mouth 2 (two) times daily., Starting 1/13/2016, Until Discontinued, OTC      levothyroxine (SYNTHROID) 125 MCG tablet Take 1 tablet (125 mcg total) by mouth once daily., Starting 4/24/2017, Until Discontinued, Normal      losartan (COZAAR) 100 MG tablet Take 1 tablet (100 mg total) by mouth once daily., Starting 4/24/2017, Until Discontinued, Normal      metoprolol succinate (TOPROL-XL) 50 MG 24 hr tablet take 1 and 1/2 tablets by mouth twice a day, Normal      MULTIVITAMIN W-MINERALS/LUTEIN (CENTRUM SILVER ORAL) Take 1 tablet by mouth., Starting 5/12/2012, Until Discontinued, Historical Med      nitroGLYCERIN (NITROSTAT) 0.4 MG SL tablet Place 1 tablet under the tongue. Every 5 min - up to 3, Starting 5/12/2012, Until Discontinued, Historical Med      omeprazole (PRILOSEC) 40 MG capsule Take 1 capsule (40 mg total) by mouth  once daily., Starting 4/24/2017, Until Discontinued, Normal      potassium chloride SA (KLOR-CON M20) 20 MEQ tablet Take 1 tablet (20 mEq total) by mouth 2 (two) times daily., Starting 12/28/2016, Until Discontinued, Normal           Time spent on the discharge of patient: > 30 minutes    Faustino Strickland NP  Department of Hospital Medicine  [facility]

## 2017-05-28 NOTE — PROGRESS NOTES
Notified SALOME Strickland, of 7 beats of SVT, lab working pending. Will hold d/c pending lab work for further interventions.

## 2017-05-28 NOTE — HPI
81 year old female with h/o CAD (had non-obstructive CAD in 9/2015), HTN, Afib and hypothyroidism presents complaining of intermittent chest pain for the past day.  Pain was substernal, she cannot describe the pain to me, just said that it was hurting.  She is not sure if it was associated with exertion.  It was non-radiating.  Lasting a few minutes at a time.  No exacerbating or relieving factors.  Currently she is chest pain free.  Blood pressure was uncontrolled on arrival with SBP greater than 200.  She is not sure if she took her blood pressure medicines today.  Says she was short of breath earlier but not at this time.

## 2017-05-28 NOTE — H&P
[facility] Hospital Medicine  History & Physical    Patient Name: Delores M Cryer  MRN: [id number]  Admission Date: 5/27/2017  Attending Physician: Amanda Raamchandran MD   Primary Care Provider: Viviana Ervin MD         Patient information was obtained from patient, past medical records and ER records.     Subjective:     Principal Problem:Chest pain    Chief Complaint:   Chief Complaint   Patient presents with    Chest Pain     CP that began today and worsened about 1 hour ago        HPI: 81 year old female with h/o CAD (had non-obstructive CAD in 9/2015), HTN, Afib and hypothyroidism presents complaining of intermittent chest pain for the past day.  Pain was substernal, she cannot describe the pain to me, just said that it was hurting.  She is not sure if it was associated with exertion.  It was non-radiating.  Lasting a few minutes at a time.  No exacerbating or relieving factors.  Currently she is chest pain free.  Blood pressure was uncontrolled on arrival with SBP greater than 200.  She is not sure if she took her blood pressure medicines today.  Says she was short of breath earlier but not at this time.    Past Medical History:   Diagnosis Date    Atrial fibrillation     Coronary artery disease     Elevated cholesterol     Hypertension     Thyroid condition        Past Surgical History:   Procedure Laterality Date    APPENDECTOMY      BACK SURGERY      x2    CARDIAC CATHETERIZATION  2016    CATARACT EXTRACTION      CHOLECYSTECTOMY      CORONARY ANGIOPLASTY  09/2015    non-obstructive CAD    EYE SURGERY      HIP SURGERY Bilateral     HYSTERECTOMY      KNEE SURGERY Bilateral     x 2 each       Review of patient's allergies indicates:   Allergen Reactions    Alendronate      Other reaction(s): Joint pain  Other reaction(s): Joint pain    Nitrofurantoin macrocrystalline      Other reaction(s): Unknown    Sulfa (sulfonamide antibiotics)      Other reaction(s): Shortness of  breath  Other reaction(s): Hives  Other reaction(s): Flushing (skin)  Other reaction(s): Edema  Other reaction(s): Shortness of breath  Other reaction(s): Hives  Other reaction(s): Flushing (skin)  Other reaction(s): Edema    Warfarin      Other reaction(s): excessive bleeding       No current facility-administered medications on file prior to encounter.      Current Outpatient Prescriptions on File Prior to Encounter   Medication Sig    atorvastatin (LIPITOR) 20 MG tablet Take 1 tablet (20 mg total) by mouth every evening.    citalopram (CELEXA) 20 MG tablet Take 1 tablet (20 mg total) by mouth once daily.    levothyroxine (SYNTHROID) 125 MCG tablet Take 1 tablet (125 mcg total) by mouth once daily.    losartan (COZAAR) 100 MG tablet Take 1 tablet (100 mg total) by mouth once daily.    metoprolol succinate (TOPROL-XL) 50 MG 24 hr tablet take 1 and 1/2 tablets by mouth twice a day    omeprazole (PRILOSEC) 40 MG capsule Take 1 capsule (40 mg total) by mouth once daily.    albuterol 90 mcg/actuation inhaler Inhale 2 puffs into the lungs every 6 (six) hours as needed for Wheezing.    aspirin 81 mg Tab Take 1 tablet by mouth Daily.    calcium carbonate-vit D3-min (CALTRATE 600+D PLUS MINERALS) 600 mg (1,500 mg)-400 unit Chew Take 1 tablet by mouth Twice daily.    fluticasone (FLONASE) 50 mcg/actuation nasal spray 2 sprays by Each Nare route once daily.    guaifenesin (MUCINEX) 600 mg 12 hr tablet Take 2 tablets (1,200 mg total) by mouth 2 (two) times daily.    MULTIVITAMIN W-MINERALS/LUTEIN (CENTRUM SILVER ORAL) Take 1 tablet by mouth.    nitroGLYCERIN (NITROSTAT) 0.4 MG SL tablet Place 1 tablet under the tongue. Every 5 min - up to 3    potassium chloride SA (KLOR-CON M20) 20 MEQ tablet Take 1 tablet (20 mEq total) by mouth 2 (two) times daily.     Family History     Problem Relation (Age of Onset)    Cataracts Mother    Diabetes Sister, Maternal Grandmother    Glaucoma Mother        Social History Main  Topics    Smoking status: Former Smoker     Packs/day: 1.00     Years: 30.00     Types: Cigarettes     Quit date: 1/1/1983    Smokeless tobacco: Never Used    Alcohol use No    Drug use: No    Sexual activity: Not on file     Review of Systems   Complete 14 point review of systems done and negative except per HPI  Objective:     Vital Signs (Most Recent):  Temp: 98.4 °F (36.9 °C) (05/27/17 2053)  Pulse: 83 (05/27/17 2302)  Resp: (!) 24 (05/27/17 2302)  BP: (!) 163/87 (05/27/17 2302)  SpO2: (!) 93 % (05/27/17 2302) Vital Signs (24h Range):  Temp:  [98.4 °F (36.9 °C)] 98.4 °F (36.9 °C)  Pulse:  [83-94] 83  Resp:  [18-31] 24  SpO2:  [93 %-96 %] 93 %  BP: (163-222)/() 163/87     Weight: 88 kg (194 lb)  Body mass index is 33.3 kg/m².    Physical Exam   Constitutional: She is oriented to person, place, and time. She appears well-developed and well-nourished. No distress.   HENT:   Head: Normocephalic and atraumatic.   Eyes: Conjunctivae are normal. Pupils are equal, round, and reactive to light. No scleral icterus.   Neck: Normal range of motion. Neck supple. No JVD present.   Cardiovascular: Normal rate.    Irregular rhythm    Pulmonary/Chest: Effort normal and breath sounds normal.   Abdominal: Soft. Bowel sounds are normal. She exhibits no distension. There is no tenderness.   Genitourinary:   Genitourinary Comments: Deferred    Musculoskeletal: Normal range of motion. She exhibits no edema.   Neurological: She is alert and oriented to person, place, and time.   No gross focal motor deficits    Skin: Skin is warm and dry. Capillary refill takes less than 2 seconds.   Psychiatric: She has a normal mood and affect. Her behavior is normal.        Significant Labs:   Recent Lab Results       05/27/17 2105      Albumin 3.6     Alkaline Phosphatase 108     ALT 12     Anion Gap 8     AST 28     Baso # 0.02     Basophil% 0.2     Total Bilirubin 1.0  Comment:  For infants and newborns, interpretation of results  should be based  on gestational age, weight and in agreement with clinical  observations.  Premature Infant recommended reference ranges:  Up to 24 hours.............<8.0 mg/dL  Up to 48 hours............<12.0 mg/dL  3-5 days..................<15.0 mg/dL  6-29 days.................<15.0 mg/dL         Comment:  Values of less than 100 pg/ml are consistent with non-CHF populations.(H)     BUN, Bld 18     Calcium 9.5     Chloride 107     CO2 25     Creatinine 0.9     Differential Method Automated     eGFR if  >60     eGFR if non  >60  Comment:  Calculation used to obtain the estimated glomerular filtration  rate (eGFR) is the CKD-EPI equation. Since race is unknown   in our information system, the eGFR values for   -American and Non--American patients are given   for each creatinine result.       Eos # 0.1     Eosinophil% 1.4     Glucose 106     Gran # 7.3     Gran% 69.9     Hematocrit 43.0     Hemoglobin 14.7     Lymph # 1.8     Lymph% 17.2(L)     MCH 30.8     MCHC 34.2     MCV 90     Mono # 1.2(H)     Mono% 11.3     MPV 10.9     Platelets 165     Potassium 5.7(H)     Total Protein 7.4     RBC 4.77     RDW 13.3     Sodium 140     Troponin I 0.023  Comment:  The reference interval for Troponin I represents the 99th percentile   cutoff   for our facility and is consistent with 3rd generation assay   performance.       WBC 10.36           Significant Imaging: CXR - cardiomegaly with clear lungs     EKG - atrial fibrillation     Assessment/Plan:     * Chest pain    History of non-obstructing CAD; admit for observation; trend troponin; most likely related to uncontrolled blood pressure - currently chest pain free; continue Aspirin/Toprol XL          Hypertensive urgency    Improved after nitropaste given in ER; will continue Toprol XL and Cozaar; hydralazine prn - if blood pressure becomes uncontrolled will adjust meds          Atrial fibrillation    Rate controlled with  Toprol XL; on Aspirin for CVA prophylaxis due to h/o GI bleed while on oral anticoagulants          Acquired hypothyroidism    Continue Synthroid           Hyperlipidemia    Continue Lipitor            VTE Risk Mitigation         Ordered     enoxaparin injection 40 mg  Daily     Route:  Subcutaneous        05/27/17 2342     Low Risk of VTE  Once      05/27/17 7972        Amanda Ramachandran MD  Department of Hospital Medicine   [facility]

## 2017-05-28 NOTE — PROGRESS NOTES
Pt arrived to floor via stretcher per ER RN; awake and alert; ambulated to bed w/ cane and staff assist x 1; oriented to room and call light; telemetry monitor applied; educated on hourly rounding; full assess per flowsheets

## 2017-05-28 NOTE — PROGRESS NOTES
Potasium level is within normal range. Okay to D/C. D/C instructions were given, all questions were answered. No signs of distress were noted. Pt. Was brought down to family car.

## 2017-05-28 NOTE — PLAN OF CARE
Problem: Patient Care Overview  Goal: Plan of Care Review  Outcome: Ongoing (interventions implemented as appropriate)  Free of falls/injury during shift  Pain managed effectively w/ PRN meds  Afib on telemetry  Tx for UTI initiated; pt w/ frequent urination  2L/NC due to SOB w/ CP  Hx of frequent falls; bed alarm active  Safety interventions in place

## 2017-05-28 NOTE — PROGRESS NOTES
EKG performed on pt as ordered by MD. 'Abnormal EKG' resulted. Unconfirmed copy given to Sumaya, patient's nurse, when notified by RT.

## 2017-05-28 NOTE — PROGRESS NOTES
Pt w/ 12 beat run of SVT; currently sitting in bed; denies any s/s; MD Ramachandran notified; no additional orders @ this time

## 2017-05-28 NOTE — ASSESSMENT & PLAN NOTE
Rate controlled with Toprol XL; on Aspirin for CVA prophylaxis due to h/o GI bleed while on oral anticoagulants

## 2017-05-28 NOTE — ASSESSMENT & PLAN NOTE
Improved after nitropaste given in ER; will continue Toprol XL and Cozaar; hydralazine prn - if blood pressure becomes uncontrolled will adjust meds

## 2017-05-29 LAB
BACTERIA #/AREA URNS HPF: ABNORMAL /HPF
BILIRUB UR QL STRIP: NEGATIVE
CLARITY UR: ABNORMAL
COLOR UR: YELLOW
GLUCOSE UR QL STRIP: NEGATIVE
HGB UR QL STRIP: ABNORMAL
KETONES UR QL STRIP: NEGATIVE
LEUKOCYTE ESTERASE UR QL STRIP: ABNORMAL
MICROSCOPIC COMMENT: ABNORMAL
NITRITE UR QL STRIP: NEGATIVE
PH UR STRIP: 7 [PH] (ref 5–8)
PROT UR QL STRIP: NEGATIVE
RBC #/AREA URNS HPF: 5 /HPF (ref 0–4)
SP GR UR STRIP: 1.01 (ref 1–1.03)
URN SPEC COLLECT METH UR: ABNORMAL
UROBILINOGEN UR STRIP-ACNC: NEGATIVE EU/DL
WBC #/AREA URNS HPF: 5 /HPF (ref 0–5)

## 2017-05-30 LAB — BACTERIA UR CULT: NORMAL

## 2017-05-31 ENCOUNTER — LAB VISIT (OUTPATIENT)
Dept: LAB | Facility: HOSPITAL | Age: 82
End: 2017-05-31
Attending: FAMILY MEDICINE
Payer: MEDICARE

## 2017-05-31 ENCOUNTER — OFFICE VISIT (OUTPATIENT)
Dept: FAMILY MEDICINE | Facility: CLINIC | Age: 82
End: 2017-05-31
Payer: MEDICARE

## 2017-05-31 VITALS
SYSTOLIC BLOOD PRESSURE: 124 MMHG | DIASTOLIC BLOOD PRESSURE: 72 MMHG | TEMPERATURE: 98 F | OXYGEN SATURATION: 98 % | HEIGHT: 64 IN | HEART RATE: 100 BPM | WEIGHT: 191.13 LBS | BODY MASS INDEX: 32.63 KG/M2

## 2017-05-31 DIAGNOSIS — R79.89 ELEVATED BRAIN NATRIURETIC PEPTIDE (BNP) LEVEL: ICD-10-CM

## 2017-05-31 DIAGNOSIS — I10 HTN (HYPERTENSION), BENIGN: Primary | ICD-10-CM

## 2017-05-31 LAB — BNP SERPL-MCNC: 174 PG/ML

## 2017-05-31 PROCEDURE — 83880 ASSAY OF NATRIURETIC PEPTIDE: CPT

## 2017-05-31 PROCEDURE — 99214 OFFICE O/P EST MOD 30 MIN: CPT | Mod: S$GLB,,, | Performed by: FAMILY MEDICINE

## 2017-05-31 PROCEDURE — 1125F AMNT PAIN NOTED PAIN PRSNT: CPT | Mod: S$GLB,,, | Performed by: FAMILY MEDICINE

## 2017-05-31 PROCEDURE — 36415 COLL VENOUS BLD VENIPUNCTURE: CPT | Mod: PO

## 2017-05-31 PROCEDURE — 99999 PR PBB SHADOW E&M-EST. PATIENT-LVL III: CPT | Mod: PBBFAC,,, | Performed by: FAMILY MEDICINE

## 2017-05-31 PROCEDURE — 1159F MED LIST DOCD IN RCRD: CPT | Mod: S$GLB,,, | Performed by: FAMILY MEDICINE

## 2017-05-31 PROCEDURE — 99499 UNLISTED E&M SERVICE: CPT | Mod: S$GLB,,, | Performed by: FAMILY MEDICINE

## 2017-05-31 RX ORDER — METOPROLOL SUCCINATE 100 MG/1
TABLET, EXTENDED RELEASE ORAL
Qty: 180 TABLET | Refills: 0 | Status: SHIPPED | OUTPATIENT
Start: 2017-05-31 | End: 2017-10-02 | Stop reason: SDUPTHER

## 2017-05-31 NOTE — PROGRESS NOTES
Subjective:       Patient ID: Delores M Cryer is a 81 y.o. female.    Chief Complaint: Hypertension    81 y old female with HTN , A fib and CAD here for f.uon Hosp due to CP after heated argument with family . Kept in observation , trop were neg . evaluated by card , culprit of CP was thought to be elevated BP , readinsg   at home have been averaging 170 / 110       Review of Systems   Constitutional: Negative.    HENT: Negative.    Respiratory: Negative.    Cardiovascular: Negative.    Gastrointestinal: Negative.        Objective:      Physical Exam   Constitutional: She is oriented to person, place, and time. She appears well-developed and well-nourished. No distress.   HENT:   Head: Normocephalic and atraumatic.   Right Ear: External ear normal.   Left Ear: External ear normal.   Mouth/Throat: No oropharyngeal exudate.   Eyes: Conjunctivae and EOM are normal. Pupils are equal, round, and reactive to light. Right eye exhibits no discharge. Left eye exhibits no discharge. No scleral icterus.   Neck: Normal range of motion. Neck supple. No JVD present. No tracheal deviation present. No thyromegaly present.   Cardiovascular: Normal rate, regular rhythm and normal heart sounds.  Exam reveals no gallop and no friction rub.    No murmur heard.  Pulmonary/Chest: Effort normal and breath sounds normal. No stridor. No respiratory distress. She has no wheezes. She has no rales. She exhibits no tenderness.   Abdominal: Soft. Bowel sounds are normal. She exhibits no distension. There is no tenderness. There is no rebound and no guarding.   Musculoskeletal: Normal range of motion.   Lymphadenopathy:     She has no cervical adenopathy.   Neurological: She is alert and oriented to person, place, and time.   Skin: Skin is warm and dry. She is not diaphoretic.   Psychiatric: She has a normal mood and affect. Her behavior is normal. Judgment and thought content normal.       Assessment:         Tania was seen today for  hypertension.    Diagnoses and all orders for this visit:    HTN (hypertension), benign    Other orders  -     Cancel: Brain natriuretic peptide; Future  -     metoprolol succinate (TOPROL-XL) 100 MG 24 hr tablet; take 1  Tab  Po BID      Plan:     Tania was seen today for hypertension.    Diagnoses and all orders for this visit:    Elevated brain natriuretic peptide (BNP) level  -     Brain natriuretic peptide; Future    Other orders  -     metoprolol succinate (TOPROL-XL) 100 MG 24 hr tablet; take 1  Tab  Po BID     Increase Toprol to 100 mg BID . E-mail BP readings in 1 w. Daughter will call here daily to prompt her to take BP meds

## 2017-06-08 ENCOUNTER — PATIENT MESSAGE (OUTPATIENT)
Dept: FAMILY MEDICINE | Facility: CLINIC | Age: 82
End: 2017-06-08

## 2017-06-09 ENCOUNTER — TELEPHONE (OUTPATIENT)
Dept: FAMILY MEDICINE | Facility: CLINIC | Age: 82
End: 2017-06-09

## 2017-06-09 RX ORDER — AMLODIPINE BESYLATE 2.5 MG/1
2.5 TABLET ORAL DAILY
Qty: 30 TABLET | Refills: 11 | Status: SHIPPED | OUTPATIENT
Start: 2017-06-09 | End: 2017-06-26 | Stop reason: SDUPTHER

## 2017-06-15 ENCOUNTER — TELEPHONE (OUTPATIENT)
Dept: FAMILY MEDICINE | Facility: CLINIC | Age: 82
End: 2017-06-15

## 2017-06-15 ENCOUNTER — CLINICAL SUPPORT (OUTPATIENT)
Dept: FAMILY MEDICINE | Facility: CLINIC | Age: 82
End: 2017-06-15
Payer: MEDICARE

## 2017-06-15 VITALS — HEART RATE: 84 BPM | DIASTOLIC BLOOD PRESSURE: 80 MMHG | SYSTOLIC BLOOD PRESSURE: 136 MMHG

## 2017-06-15 NOTE — TELEPHONE ENCOUNTER
----- Message from Janice Coley LPN sent at 6/15/2017  2:30 PM CDT -----  Patient bp at nurse visit today was taken in right arm, read 136/80,  and pulse was 84.

## 2017-06-15 NOTE — PROGRESS NOTES
Patient here for a nurse visit Blood Pressure check. Blood pressure taken in right arm, patient sitting in chair BP was 136/80 and pulse was 84.

## 2017-06-26 RX ORDER — AMLODIPINE BESYLATE 2.5 MG/1
TABLET ORAL
Qty: 60 TABLET | Refills: 5 | Status: SHIPPED | OUTPATIENT
Start: 2017-06-26 | End: 2018-02-04 | Stop reason: SDUPTHER

## 2017-07-11 ENCOUNTER — HOSPITAL ENCOUNTER (OUTPATIENT)
Dept: RADIOLOGY | Facility: HOSPITAL | Age: 82
Discharge: HOME | End: 2017-07-11
Attending: NURSE PRACTITIONER
Payer: MEDICARE

## 2017-07-11 ENCOUNTER — HOSPITAL ENCOUNTER (OUTPATIENT)
Facility: HOSPITAL | Age: 82
Discharge: HOME | End: 2017-07-12
Attending: EMERGENCY MEDICINE | Admitting: INTERNAL MEDICINE
Payer: MEDICARE

## 2017-07-11 ENCOUNTER — OFFICE VISIT (OUTPATIENT)
Dept: FAMILY MEDICINE | Facility: CLINIC | Age: 82
End: 2017-07-11
Payer: MEDICARE

## 2017-07-11 VITALS
OXYGEN SATURATION: 97 % | RESPIRATION RATE: 18 BRPM | HEART RATE: 84 BPM | BODY MASS INDEX: 31.66 KG/M2 | TEMPERATURE: 98 F | WEIGHT: 185.44 LBS | DIASTOLIC BLOOD PRESSURE: 81 MMHG | HEIGHT: 64 IN | SYSTOLIC BLOOD PRESSURE: 151 MMHG

## 2017-07-11 DIAGNOSIS — I48.19 PERSISTENT ATRIAL FIBRILLATION: Chronic | ICD-10-CM

## 2017-07-11 DIAGNOSIS — R07.89 OTHER CHEST PAIN: Primary | ICD-10-CM

## 2017-07-11 DIAGNOSIS — I48.19 PERSISTENT ATRIAL FIBRILLATION: ICD-10-CM

## 2017-07-11 DIAGNOSIS — R07.9 CHEST PAIN, UNSPECIFIED TYPE: ICD-10-CM

## 2017-07-11 DIAGNOSIS — R07.9 CHEST PAIN: ICD-10-CM

## 2017-07-11 DIAGNOSIS — R07.9 CHEST PAIN, UNSPECIFIED TYPE: Primary | ICD-10-CM

## 2017-07-11 LAB
ALBUMIN SERPL BCP-MCNC: 3.4 G/DL
ALP SERPL-CCNC: 119 U/L
ALT SERPL W/O P-5'-P-CCNC: 8 U/L
ANION GAP SERPL CALC-SCNC: 10 MMOL/L
AST SERPL-CCNC: 15 U/L
BACTERIA #/AREA URNS HPF: ABNORMAL /HPF
BASOPHILS # BLD AUTO: ABNORMAL K/UL
BASOPHILS NFR BLD: 0 %
BILIRUB SERPL-MCNC: 2.3 MG/DL
BILIRUB UR QL STRIP: NEGATIVE
BNP SERPL-MCNC: 490 PG/ML
BUN SERPL-MCNC: 17 MG/DL
CALCIUM SERPL-MCNC: 9.6 MG/DL
CHLORIDE SERPL-SCNC: 105 MMOL/L
CK SERPL-CCNC: 39 U/L
CLARITY UR: CLEAR
CO2 SERPL-SCNC: 24 MMOL/L
COLOR UR: YELLOW
CREAT SERPL-MCNC: 0.8 MG/DL
DIFFERENTIAL METHOD: ABNORMAL
EOSINOPHIL # BLD AUTO: ABNORMAL K/UL
EOSINOPHIL NFR BLD: 2 %
ERYTHROCYTE [DISTWIDTH] IN BLOOD BY AUTOMATED COUNT: 13.4 %
EST. GFR  (AFRICAN AMERICAN): >60 ML/MIN/1.73 M^2
EST. GFR  (NON AFRICAN AMERICAN): >60 ML/MIN/1.73 M^2
GLUCOSE SERPL-MCNC: 112 MG/DL
GLUCOSE UR QL STRIP: NEGATIVE
HCT VFR BLD AUTO: 42.7 %
HGB BLD-MCNC: 14.6 G/DL
HGB UR QL STRIP: ABNORMAL
KETONES UR QL STRIP: NEGATIVE
LEUKOCYTE ESTERASE UR QL STRIP: ABNORMAL
LYMPHOCYTES # BLD AUTO: ABNORMAL K/UL
LYMPHOCYTES NFR BLD: 12 %
MCH RBC QN AUTO: 29.9 PG
MCHC RBC AUTO-ENTMCNC: 34.2 %
MCV RBC AUTO: 87 FL
MICROSCOPIC COMMENT: ABNORMAL
MONOCYTES # BLD AUTO: ABNORMAL K/UL
MONOCYTES NFR BLD: 8 %
NEUTROPHILS NFR BLD: 78 %
NITRITE UR QL STRIP: POSITIVE
PH UR STRIP: 6 [PH] (ref 5–8)
PLATELET # BLD AUTO: 175 K/UL
PMV BLD AUTO: 10.3 FL
POTASSIUM SERPL-SCNC: 4.3 MMOL/L
PROT SERPL-MCNC: 7 G/DL
PROT UR QL STRIP: ABNORMAL
RBC # BLD AUTO: 4.89 M/UL
RBC #/AREA URNS HPF: 3 /HPF (ref 0–4)
SODIUM SERPL-SCNC: 139 MMOL/L
SP GR UR STRIP: 1.02 (ref 1–1.03)
TROPONIN I SERPL DL<=0.01 NG/ML-MCNC: 0.01 NG/ML
TROPONIN I SERPL DL<=0.01 NG/ML-MCNC: 0.03 NG/ML
URN SPEC COLLECT METH UR: ABNORMAL
UROBILINOGEN UR STRIP-ACNC: NEGATIVE EU/DL
WBC # BLD AUTO: 13.13 K/UL
WBC #/AREA URNS HPF: >100 /HPF (ref 0–5)

## 2017-07-11 PROCEDURE — 81000 URINALYSIS NONAUTO W/SCOPE: CPT

## 2017-07-11 PROCEDURE — 99213 OFFICE O/P EST LOW 20 MIN: CPT | Mod: S$GLB,,, | Performed by: NURSE PRACTITIONER

## 2017-07-11 PROCEDURE — 93005 ELECTROCARDIOGRAM TRACING: CPT

## 2017-07-11 PROCEDURE — 93010 ELECTROCARDIOGRAM REPORT: CPT | Mod: ,,, | Performed by: INTERNAL MEDICINE

## 2017-07-11 PROCEDURE — 84484 ASSAY OF TROPONIN QUANT: CPT | Mod: 91

## 2017-07-11 PROCEDURE — 93010 ELECTROCARDIOGRAM REPORT: CPT | Mod: S$GLB,,, | Performed by: NUCLEAR MEDICINE

## 2017-07-11 PROCEDURE — 93005 ELECTROCARDIOGRAM TRACING: CPT | Mod: S$GLB,,, | Performed by: NURSE PRACTITIONER

## 2017-07-11 PROCEDURE — 25000003 PHARM REV CODE 250: Performed by: NURSE PRACTITIONER

## 2017-07-11 PROCEDURE — 1159F MED LIST DOCD IN RCRD: CPT | Mod: S$GLB,,, | Performed by: NURSE PRACTITIONER

## 2017-07-11 PROCEDURE — 99499 UNLISTED E&M SERVICE: CPT | Mod: S$GLB,,, | Performed by: NURSE PRACTITIONER

## 2017-07-11 PROCEDURE — 85027 COMPLETE CBC AUTOMATED: CPT

## 2017-07-11 PROCEDURE — 83880 ASSAY OF NATRIURETIC PEPTIDE: CPT

## 2017-07-11 PROCEDURE — 71020 XR CHEST PA AND LATERAL: CPT | Mod: 26,,, | Performed by: RADIOLOGY

## 2017-07-11 PROCEDURE — 99285 EMERGENCY DEPT VISIT HI MDM: CPT | Mod: 25

## 2017-07-11 PROCEDURE — G0378 HOSPITAL OBSERVATION PER HR: HCPCS

## 2017-07-11 PROCEDURE — 99999 PR PBB SHADOW E&M-EST. PATIENT-LVL IV: CPT | Mod: PBBFAC,,, | Performed by: NURSE PRACTITIONER

## 2017-07-11 PROCEDURE — 96365 THER/PROPH/DIAG IV INF INIT: CPT

## 2017-07-11 PROCEDURE — 80053 COMPREHEN METABOLIC PANEL: CPT

## 2017-07-11 PROCEDURE — 85007 BL SMEAR W/DIFF WBC COUNT: CPT

## 2017-07-11 PROCEDURE — 63600175 PHARM REV CODE 636 W HCPCS: Performed by: INTERNAL MEDICINE

## 2017-07-11 PROCEDURE — 82550 ASSAY OF CK (CPK): CPT

## 2017-07-11 RX ORDER — NAPROXEN SODIUM 220 MG/1
81 TABLET, FILM COATED ORAL DAILY
Status: DISCONTINUED | OUTPATIENT
Start: 2017-07-12 | End: 2017-07-12

## 2017-07-11 RX ORDER — NITROGLYCERIN 0.4 MG/1
0.4 TABLET SUBLINGUAL EVERY 5 MIN PRN
Status: DISCONTINUED | OUTPATIENT
Start: 2017-07-11 | End: 2017-07-12 | Stop reason: HOSPADM

## 2017-07-11 RX ORDER — AMLODIPINE BESYLATE 2.5 MG/1
2.5 TABLET ORAL DAILY
Status: DISCONTINUED | OUTPATIENT
Start: 2017-07-12 | End: 2017-07-12

## 2017-07-11 RX ORDER — CITALOPRAM 20 MG/1
20 TABLET, FILM COATED ORAL DAILY
Status: DISCONTINUED | OUTPATIENT
Start: 2017-07-12 | End: 2017-07-12 | Stop reason: HOSPADM

## 2017-07-11 RX ORDER — SODIUM CHLORIDE 0.9 % (FLUSH) 0.9 %
3 SYRINGE (ML) INJECTION EVERY 8 HOURS
Status: DISCONTINUED | OUTPATIENT
Start: 2017-07-11 | End: 2017-07-12 | Stop reason: HOSPADM

## 2017-07-11 RX ORDER — ALBUTEROL SULFATE 90 UG/1
2 AEROSOL, METERED RESPIRATORY (INHALATION) EVERY 6 HOURS PRN
Status: DISCONTINUED | OUTPATIENT
Start: 2017-07-11 | End: 2017-07-11

## 2017-07-11 RX ORDER — METOPROLOL SUCCINATE 50 MG/1
100 TABLET, EXTENDED RELEASE ORAL DAILY
Status: DISCONTINUED | OUTPATIENT
Start: 2017-07-12 | End: 2017-07-12 | Stop reason: HOSPADM

## 2017-07-11 RX ORDER — SODIUM CHLORIDE 0.9 % (FLUSH) 0.9 %
3 SYRINGE (ML) INJECTION EVERY 8 HOURS
Status: DISCONTINUED | OUTPATIENT
Start: 2017-07-11 | End: 2017-07-11

## 2017-07-11 RX ORDER — ACETAMINOPHEN 325 MG/1
650 TABLET ORAL EVERY 4 HOURS PRN
Status: DISCONTINUED | OUTPATIENT
Start: 2017-07-11 | End: 2017-07-11

## 2017-07-11 RX ORDER — MORPHINE SULFATE 2 MG/ML
2 INJECTION, SOLUTION INTRAMUSCULAR; INTRAVENOUS EVERY 4 HOURS PRN
Status: DISCONTINUED | OUTPATIENT
Start: 2017-07-11 | End: 2017-07-11

## 2017-07-11 RX ORDER — ONDANSETRON 2 MG/ML
4 INJECTION INTRAMUSCULAR; INTRAVENOUS EVERY 12 HOURS PRN
Status: DISCONTINUED | OUTPATIENT
Start: 2017-07-11 | End: 2017-07-12 | Stop reason: HOSPADM

## 2017-07-11 RX ORDER — MORPHINE SULFATE 2 MG/ML
2 INJECTION, SOLUTION INTRAMUSCULAR; INTRAVENOUS EVERY 4 HOURS PRN
Status: DISCONTINUED | OUTPATIENT
Start: 2017-07-11 | End: 2017-07-12 | Stop reason: HOSPADM

## 2017-07-11 RX ORDER — METOCLOPRAMIDE HYDROCHLORIDE 5 MG/ML
5 INJECTION INTRAMUSCULAR; INTRAVENOUS EVERY 6 HOURS PRN
Status: DISCONTINUED | OUTPATIENT
Start: 2017-07-11 | End: 2017-07-12 | Stop reason: HOSPADM

## 2017-07-11 RX ORDER — ENOXAPARIN SODIUM 100 MG/ML
40 INJECTION SUBCUTANEOUS EVERY 24 HOURS
Status: DISCONTINUED | OUTPATIENT
Start: 2017-07-11 | End: 2017-07-11

## 2017-07-11 RX ORDER — LEVOTHYROXINE SODIUM 125 UG/1
125 TABLET ORAL
Status: DISCONTINUED | OUTPATIENT
Start: 2017-07-12 | End: 2017-07-12 | Stop reason: HOSPADM

## 2017-07-11 RX ORDER — LOSARTAN POTASSIUM 50 MG/1
100 TABLET ORAL DAILY
Status: DISCONTINUED | OUTPATIENT
Start: 2017-07-12 | End: 2017-07-12 | Stop reason: HOSPADM

## 2017-07-11 RX ORDER — ACETAMINOPHEN 325 MG/1
650 TABLET ORAL EVERY 4 HOURS PRN
Status: DISCONTINUED | OUTPATIENT
Start: 2017-07-11 | End: 2017-07-12 | Stop reason: HOSPADM

## 2017-07-11 RX ORDER — ALBUTEROL SULFATE 2.5 MG/.5ML
2.5 SOLUTION RESPIRATORY (INHALATION) EVERY 6 HOURS PRN
Status: DISCONTINUED | OUTPATIENT
Start: 2017-07-11 | End: 2017-07-12 | Stop reason: HOSPADM

## 2017-07-11 RX ORDER — METOPROLOL SUCCINATE 25 MG/1
25 TABLET, EXTENDED RELEASE ORAL DAILY
Status: DISCONTINUED | OUTPATIENT
Start: 2017-07-12 | End: 2017-07-11

## 2017-07-11 RX ORDER — ATORVASTATIN CALCIUM 10 MG/1
20 TABLET, FILM COATED ORAL NIGHTLY
Status: DISCONTINUED | OUTPATIENT
Start: 2017-07-11 | End: 2017-07-12 | Stop reason: HOSPADM

## 2017-07-11 RX ORDER — ASPIRIN 325 MG
325 TABLET ORAL DAILY
Status: DISCONTINUED | OUTPATIENT
Start: 2017-07-12 | End: 2017-07-11

## 2017-07-11 RX ORDER — NITROGLYCERIN 0.4 MG/1
0.4 TABLET SUBLINGUAL EVERY 5 MIN PRN
Status: DISCONTINUED | OUTPATIENT
Start: 2017-07-11 | End: 2017-07-11

## 2017-07-11 RX ORDER — ONDANSETRON 2 MG/ML
4 INJECTION INTRAMUSCULAR; INTRAVENOUS EVERY 12 HOURS PRN
Status: DISCONTINUED | OUTPATIENT
Start: 2017-07-11 | End: 2017-07-11

## 2017-07-11 RX ADMIN — CEFTRIAXONE 1 G: 1 INJECTION, SOLUTION INTRAVENOUS at 09:07

## 2017-07-11 RX ADMIN — SODIUM CHLORIDE, PRESERVATIVE FREE 3 ML: 5 INJECTION INTRAVENOUS at 09:07

## 2017-07-11 RX ADMIN — ATORVASTATIN CALCIUM 20 MG: 10 TABLET, FILM COATED ORAL at 09:07

## 2017-07-11 NOTE — ED PROVIDER NOTES
SCRIBE #1 NOTE: I, Joana Ruffin, am scribing for, and in the presence of, Ian Washington MD. I have scribed the entire note.      History      Chief Complaint   Patient presents with    Chest Pain     sent by PCP for abnormal EKG; midsternal chest pain      Review of patient's allergies indicates:   Allergen Reactions    Alendronate      Other reaction(s): Joint pain  Other reaction(s): Joint pain    Nitrofurantoin macrocrystalline      Other reaction(s): Unknown    Sulfa (sulfonamide antibiotics)      Other reaction(s): Shortness of breath  Other reaction(s): Hives  Other reaction(s): Flushing (skin)  Other reaction(s): Edema  Other reaction(s): Shortness of breath  Other reaction(s): Hives  Other reaction(s): Flushing (skin)  Other reaction(s): Edema    Warfarin      Other reaction(s): excessive bleeding        HPI   HPI    7/11/2017, 4:04 PM   History obtained from the patient      History of Present Illness: Delores M Cryer is a 81 y.o. female patient who presents to the Emergency Department for chest pain which onset gradually this morning. Pain is located to the diffuse chest. Sx have been constant and moderate in severity. No modifying factors noted. Pt was sent to the ED by her PCP due to an abnormal EKG. No associated sx included. Pt denies any fever, chills, diaphoresis, CP, leg swelling, n/v, or dizziness.     Arrival mode: Personal vehicle    PCP: ANNE LANGE MD       Past Medical History:  Past Medical History:   Diagnosis Date    Atrial fibrillation     Coronary artery disease     Elevated cholesterol     Hypertension     Thyroid condition        Past Surgical History:  Past Surgical History:   Procedure Laterality Date    APPENDECTOMY      BACK SURGERY      x2    CARDIAC CATHETERIZATION  2016    CATARACT EXTRACTION      CHOLECYSTECTOMY      CORONARY ANGIOPLASTY  09/2015    non-obstructive CAD    EYE SURGERY      HIP SURGERY Bilateral     HYSTERECTOMY      KNEE  SURGERY Bilateral     x 2 each         Family History:  Family History   Problem Relation Age of Onset    Cataracts Mother     Glaucoma Mother     Heart disease Mother     Diabetes Sister     Diabetes Maternal Grandmother     Hypertension Father        Social History:  Social History     Social History Main Topics    Smoking status: Former Smoker     Packs/day: 1.00     Years: 30.00     Types: Cigarettes     Quit date: 1/1/1983    Smokeless tobacco: Never Used    Alcohol use No    Drug use: No    Sexual activity: Unknown        ROS   Review of Systems   Constitutional: Negative for chills, diaphoresis and fever.   HENT: Negative for sore throat.    Respiratory: Negative for cough and shortness of breath.    Cardiovascular: Positive for chest pain. Negative for leg swelling.   Gastrointestinal: Negative for abdominal pain, blood in stool, constipation, diarrhea, nausea and vomiting.   Genitourinary: Negative for dysuria.   Musculoskeletal: Negative for back pain.   Skin: Negative for rash.   Neurological: Negative for dizziness, weakness, light-headedness, numbness and headaches.   Hematological: Does not bruise/bleed easily.     Physical Exam      Initial Vitals [07/11/17 1557]   BP Pulse Resp Temp SpO2   (!) 153/69 92 18 98 °F (36.7 °C) 95 %      MAP       97          Physical Exam  Nursing Notes and Vital Signs Reviewed.  Constitutional: Patient is in no acute distress. Well-developed and well-nourished. Elderly.   Head: Atraumatic. Normocephalic.  Eyes: PERRL. EOM intact. Conjunctivae are not pale. No scleral icterus.  ENT: Mucous membranes are moist. Oropharynx is clear and symmetric.    Neck: Supple. Full ROM. No lymphadenopathy.  Cardiovascular: Regular rate. Regular rhythm. No murmurs, rubs, or gallops. Distal pulses are 2+ and symmetric.  Pulmonary/Chest: No respiratory distress. Clear to auscultation bilaterally. No wheezing, rales, or rhonchi.  Abdominal: Soft and non-distended.  There is no  "tenderness.  No rebound, guarding, or rigidity.  Musculoskeletal: Moves all extremities. No obvious deformities. No edema. No calf tenderness.  Skin: Warm and dry.  Neurological:  Alert, awake, and appropriate.  Normal speech.  No acute focal neurological deficits are appreciated.  Psychiatric: Normal affect. Good eye contact. Appropriate in content.    ED Course    Procedures  ED Vital Signs:  Vitals:    07/11/17 1557   BP: (!) 153/69   Pulse: 92   Resp: 18   Temp: 98 °F (36.7 °C)   TempSrc: Oral   SpO2: 95%   Weight: 84.1 kg (185 lb 6.5 oz)   Height: 5' 4" (1.626 m)     Abnormal Lab Results:  Labs Reviewed   CBC W/ AUTO DIFFERENTIAL - Abnormal; Notable for the following:        Result Value    WBC 13.13 (*)     Gran% 78.0 (*)     Lymph% 12.0 (*)     All other components within normal limits   COMPREHENSIVE METABOLIC PANEL - Abnormal; Notable for the following:     Glucose 112 (*)     Albumin 3.4 (*)     Total Bilirubin 2.3 (*)     ALT 8 (*)     All other components within normal limits   B-TYPE NATRIURETIC PEPTIDE - Abnormal; Notable for the following:      (*)     All other components within normal limits   TROPONIN I - Abnormal; Notable for the following:     Troponin I 0.027 (*)     All other components within normal limits   CK   URINALYSIS        All Lab Results:  Results for orders placed or performed during the hospital encounter of 07/11/17   CBC auto differential   Result Value Ref Range    WBC 13.13 (H) 3.90 - 12.70 K/uL    RBC 4.89 4.00 - 5.40 M/uL    Hemoglobin 14.6 12.0 - 16.0 g/dL    Hematocrit 42.7 37.0 - 48.5 %    MCV 87 82 - 98 fL    MCH 29.9 27.0 - 31.0 pg    MCHC 34.2 32.0 - 36.0 %    RDW 13.4 11.5 - 14.5 %    Platelets 175 150 - 350 K/uL    MPV 10.3 9.2 - 12.9 fL    Lymph # CANCELED 1.0 - 4.8 K/uL    Mono # CANCELED 0.3 - 1.0 K/uL    Eos # CANCELED 0.0 - 0.5 K/uL    Baso # CANCELED 0.00 - 0.20 K/uL    Gran% 78.0 (H) 38.0 - 73.0 %    Lymph% 12.0 (L) 18.0 - 48.0 %    Mono% 8.0 4.0 - 15.0 % "    Eosinophil% 2.0 0.0 - 8.0 %    Basophil% 0.0 0.0 - 1.9 %    Differential Method Manual    Comprehensive metabolic panel   Result Value Ref Range    Sodium 139 136 - 145 mmol/L    Potassium 4.3 3.5 - 5.1 mmol/L    Chloride 105 95 - 110 mmol/L    CO2 24 23 - 29 mmol/L    Glucose 112 (H) 70 - 110 mg/dL    BUN, Bld 17 8 - 23 mg/dL    Creatinine 0.8 0.5 - 1.4 mg/dL    Calcium 9.6 8.7 - 10.5 mg/dL    Total Protein 7.0 6.0 - 8.4 g/dL    Albumin 3.4 (L) 3.5 - 5.2 g/dL    Total Bilirubin 2.3 (H) 0.1 - 1.0 mg/dL    Alkaline Phosphatase 119 55 - 135 U/L    AST 15 10 - 40 U/L    ALT 8 (L) 10 - 44 U/L    Anion Gap 10 8 - 16 mmol/L    eGFR if African American >60 >60 mL/min/1.73 m^2    eGFR if non African American >60 >60 mL/min/1.73 m^2   Brain natriuretic peptide   Result Value Ref Range     (H) 0 - 99 pg/mL   CK   Result Value Ref Range    CPK 39 20 - 180 U/L   Troponin I   Result Value Ref Range    Troponin I 0.027 (H) 0.000 - 0.026 ng/mL         Imaging Results:  Imaging Results    None        Chest X-ray Results from clinic:  2 view chest    Comparison: 05/27/2017    Findings: There are linear stranding changes noted within the left lung base most likely representing atelectasis.  No infiltrates or effusions.  The heart is enlarged.  There is calcification of the aortic knob.   Impression         1.  As above      Electronically signed by: MARK FONTENOT D.O.  Date: 07/11/17  Time: 15:36        The EKG was ordered, reviewed, and independently interpreted by the ED provider.  Interpretation time: 16:14  Rate: 83 BPM  Rhythm: atrial fibrillation  Interpretation: Minimal voltage criteria for LVH. No STEMI.         The Emergency Provider reviewed the vital signs and test results, which are outlined above.    ED Discussion     4:58 PM: Re-evaluated pt. I have discussed test results, shared treatment plan, and the need for admission with patient and family at bedside. Pt and family express understanding at this time  and agree with all information. All questions answered. Pt and family have no further questions or concerns at this time. Pt is ready for admit.    5:06 PM: Discussed case with Vale Burnett NP ([facility]). Vale agrees with current care and management of pt and accepts admission.   Admitting Service: Hospital medicine   Admitting Physician: Dr. Carrillo  Admit to: Observation     ED Medication(s):  Medications - No data to display      Medical Decision Making    Medical Decision Making:   Clinical Tests:   Lab Tests: Ordered and Reviewed  Medical Tests: Ordered and Reviewed           Scribe Attestation:   Scribe #1: I performed the above scribed service and the documentation accurately describes the services I performed. I attest to the accuracy of the note.    Attending:   Physician Attestation Statement for Scribe #1: I, Ian Washington MD, personally performed the services described in this documentation, as scribed by Joana Ruffin, in my presence, and it is both accurate and complete.          Clinical Impression       ICD-10-CM ICD-9-CM   1. Other chest pain R07.89 786.59       Disposition:   Disposition: Placed in Observation  Condition: Fair         Ian Washington MD  07/11/17 0734

## 2017-07-11 NOTE — PROGRESS NOTES
Subjective:       Patient ID: Delores M Cryer is a 81 y.o. female.    Chief Complaint: Chest Pain and Arm Pain  pt reports to clinic with  and daughter.  Pt's  reports patient has been complaining of chest pain for 2 days, worsening today.  Pain worsens with inhalation, denies coughing, or belching.  Pain does radiate to left shoulder and arm. Describes pain as aching.  Negative for SOB; PMH is positive for afib, CAD, NSTEMI, Echo 6/5/15=55-60%. Pt hospitalized May 2017 for chest pain and htn urgency.  Cardiac enzymes were negative during hospitalization  Chest Pain    This is a recurrent problem. The current episode started yesterday. The problem occurs intermittently. The pain is present in the substernal region and lateral region. The pain radiates to the left shoulder and left arm. Associated symptoms include irregular heartbeat. Pertinent negatives include no cough, exertional chest pressure or shortness of breath.   Her past medical history is significant for arrhythmia and MI.   Arm Pain    Associated symptoms include chest pain.     Review of Systems   Respiratory: Negative for cough and shortness of breath.    Cardiovascular: Positive for chest pain.       Objective:      Physical Exam   Constitutional: She appears well-developed and well-nourished.   HENT:   Head: Normocephalic.   Eyes: EOM are normal.   Cardiovascular: Normal rate.  An irregularly irregular rhythm present.   Pulmonary/Chest: Effort normal. She has decreased breath sounds in the right upper field, the right lower field, the left upper field and the left lower field.   Abdominal: Soft. Bowel sounds are normal.   Musculoskeletal: She exhibits edema (2+ ble).   Neurological:   confusion   Skin: Skin is warm and dry.   Vitals reviewed.      Assessment:       1. Chest pain, unspecified type    2. Persistent atrial fibrillation        Plan:   Chest pain, unspecified type  -     IN OFFICE EKG 12-LEAD (to Muse)  -     X-Ray Chest PA  And Lateral; Future; Expected date: 07/11/2017  Due to patient's cardiac history is better evaluated in ER.    Referred to ER  Persistent atrial fibrillation      No Follow-up on file.

## 2017-07-11 NOTE — H&P
[facility]  History & Physical    Patient Name: Delores M Cryer  MRN: [id number]  Admission Date: 7/11/2017  Attending Physician: Gennaro Carrillo MD   Primary Care Provider: ANNE LANGE MD         Patient information was obtained from patient and ER records.     Subjective:     Principal Problem:Chest pain    Chief Complaint:   Chief Complaint   Patient presents with    Chest Pain     sent by PCP for abnormal EKG; midsternal chest pain         HPI: Delores M Cryer is a 81 y.o. female patient  with PMH of afib, CAD, and NSTEMI who was sent to the ED by her PCP due to an abnormal EKG. Patient reports chest pain for 2 days, worsening today.  Pain worsens with inhalation. Pain is substernal and is described as aching. Pain does radiate to left shoulder and arm. Associated symptoms include irregular heartbeat. Pertinent negatives include no cough, exertional chest pressure or shortness of breath.  Echo 6/5/15=55-60%. Troponin-0.027, , WBCs 13.13. Patient admitted for observation.       Past Medical History:   Diagnosis Date    Atrial fibrillation     Coronary artery disease     Elevated cholesterol     Hypertension     Thyroid condition        Past Surgical History:   Procedure Laterality Date    APPENDECTOMY      BACK SURGERY      x2    CARDIAC CATHETERIZATION  2016    CATARACT EXTRACTION      CHOLECYSTECTOMY      CORONARY ANGIOPLASTY  09/2015    non-obstructive CAD    EYE SURGERY      HIP SURGERY Bilateral     HYSTERECTOMY      KNEE SURGERY Bilateral     x 2 each       Review of patient's allergies indicates:   Allergen Reactions    Alendronate      Other reaction(s): Joint pain  Other reaction(s): Joint pain    Nitrofurantoin macrocrystalline      Other reaction(s): Unknown    Sulfa (sulfonamide antibiotics)      Other reaction(s): Shortness of breath  Other reaction(s): Hives  Other reaction(s): Flushing (skin)  Other reaction(s): Edema  Other  reaction(s): Shortness of breath  Other reaction(s): Hives  Other reaction(s): Flushing (skin)  Other reaction(s): Edema    Warfarin      Other reaction(s): excessive bleeding       No current facility-administered medications on file prior to encounter.      Current Outpatient Prescriptions on File Prior to Encounter   Medication Sig    albuterol 90 mcg/actuation inhaler Inhale 2 puffs into the lungs every 6 (six) hours as needed for Wheezing.    amlodipine (NORVASC) 2.5 MG tablet take 1 tablet by mouth twice a day **PATIENT SAYS THAT IT HAS BEEN CHANGED FROM DAILY**    aspirin 81 mg Tab Take 1 tablet by mouth Daily.    atorvastatin (LIPITOR) 20 MG tablet Take 1 tablet (20 mg total) by mouth every evening.    calcium carbonate-vit D3-min (CALTRATE 600+D PLUS MINERALS) 600 mg (1,500 mg)-400 unit Chew Take 1 tablet by mouth Twice daily.    citalopram (CELEXA) 20 MG tablet Take 1 tablet (20 mg total) by mouth once daily.    fluticasone (FLONASE) 50 mcg/actuation nasal spray 2 sprays by Each Nare route once daily.    levothyroxine (SYNTHROID) 125 MCG tablet Take 1 tablet (125 mcg total) by mouth once daily.    losartan (COZAAR) 100 MG tablet Take 1 tablet (100 mg total) by mouth once daily.    metoprolol succinate (TOPROL-XL) 100 MG 24 hr tablet take 1  Tab  Po BID    MULTIVITAMIN W-MINERALS/LUTEIN (CENTRUM SILVER ORAL) Take 1 tablet by mouth.    nitroGLYCERIN (NITROSTAT) 0.4 MG SL tablet Place 1 tablet under the tongue. Every 5 min - up to 3    omeprazole (PRILOSEC) 40 MG capsule Take 1 capsule (40 mg total) by mouth once daily.    potassium chloride SA (KLOR-CON M20) 20 MEQ tablet Take 1 tablet (20 mEq total) by mouth 2 (two) times daily.     Family History     Problem Relation (Age of Onset)    Cataracts Mother    Diabetes Sister, Maternal Grandmother    Glaucoma Mother    Heart disease Mother    Hypertension Father        Social History Main Topics    Smoking status: Former Smoker     Packs/day:  1.00     Years: 30.00     Types: Cigarettes     Quit date: 1/1/1983    Smokeless tobacco: Never Used    Alcohol use No    Drug use: No    Sexual activity: Not on file     Review of Systems   Constitutional: Negative.  Negative for activity change, appetite change, fatigue and fever.   HENT: Negative.    Eyes: Negative.    Respiratory: Negative for cough, chest tightness and shortness of breath.    Cardiovascular: Positive for chest pain. Negative for palpitations and leg swelling.   Gastrointestinal: Negative for abdominal pain, nausea and vomiting.   Endocrine: Negative.    Genitourinary: Negative.  Negative for dysuria, frequency and urgency.   Musculoskeletal: Negative.    Skin: Negative.    Allergic/Immunologic: Negative.    Neurological: Negative for dizziness, speech difficulty, weakness, light-headedness and headaches.   Hematological: Negative.    Psychiatric/Behavioral: Positive for confusion.     Objective:     Vital Signs (Most Recent):  Temp: 98 °F (36.7 °C) (07/11/17 1557)  Pulse: 92 (07/11/17 1557)  Resp: 18 (07/11/17 1557)  BP: (!) 153/69 (07/11/17 1557)  SpO2: 95 % (07/11/17 1557) Vital Signs (24h Range):  Temp:  [97.9 °F (36.6 °C)-98 °F (36.7 °C)] 98 °F (36.7 °C)  Pulse:  [84-92] 92  Resp:  [18] 18  SpO2:  [95 %-97 %] 95 %  BP: (151-153)/(69-81) 153/69     Weight: 84.1 kg (185 lb 6.5 oz)  Body mass index is 31.83 kg/m².    Physical Exam   Constitutional: She is oriented to person, place, and time. She appears well-developed and well-nourished.   HENT:   Head: Normocephalic and atraumatic.   Eyes: Conjunctivae and EOM are normal. Pupils are equal, round, and reactive to light.   Neck: Normal range of motion. Neck supple.   Cardiovascular: Normal rate, normal heart sounds and intact distal pulses.  An irregularly irregular rhythm present.   No murmur heard.  Pulmonary/Chest: Effort normal and breath sounds normal.   Abdominal: Soft. Bowel sounds are normal.   Musculoskeletal: Normal range of  motion. She exhibits edema (trace edema to nick LE's ).   Neurological: She is alert and oriented to person, place, and time. She has normal reflexes.   Skin: Skin is warm and dry.   Psychiatric: She has a normal mood and affect. Her behavior is normal. Judgment and thought content normal.   Nursing note and vitals reviewed.       Significant Labs:   BMP:   Recent Labs  Lab 07/11/17  1611   *      K 4.3      CO2 24   BUN 17   CREATININE 0.8   CALCIUM 9.6     CBC:   Recent Labs  Lab 07/11/17  1611   WBC 13.13*   HGB 14.6   HCT 42.7        CMP:   Recent Labs  Lab 07/11/17  1611      K 4.3      CO2 24   *   BUN 17   CREATININE 0.8   CALCIUM 9.6   PROT 7.0   ALBUMIN 3.4*   BILITOT 2.3*   ALKPHOS 119   AST 15   ALT 8*   ANIONGAP 10   EGFRNONAA >60     Troponin:   Recent Labs  Lab 07/11/17  1611   TROPONINI 0.027*     All pertinent labs within the past 24 hours have been reviewed.    Significant Imaging:  Imaging Results    None       Assessment/Plan:     * Chest pain    T-0.027  Serial Troponin's  BNP-490  Cardiology consulted  2D-Echo pending   ASA, Morphine PRN, Supplemental O2 to maintain sats > 92%, Nitro SL PRN   4/24/2017 Lipid panel normal, Hemoglobin 5.4  Resumed BB  Telemetry monitoring         Dementia without behavioral disturbance    Patient forgetful   Fall precautions             Acquired hypothyroidism    Resume synthroid           Atrial fibrillation    Cardiology following  Chronic A-fib   Telemetry monitoring   Reviewed pt. Medical record and found that  pt. Was on Coumadin in the past an experienced excessive bleeding.   Pt. Currently is not on anticoagulates   Patient is high risk fall   SCDs will be placed                  Hyperlipidemia    Resume Statin           Essential hypertension    Resume home medications   Will continue to monitor               VTE Risk Mitigation     None        Vale Burnett NP  Department of Hospital Medicine   [facility]

## 2017-07-11 NOTE — SUBJECTIVE & OBJECTIVE
Past Medical History:   Diagnosis Date    Atrial fibrillation     Coronary artery disease     Elevated cholesterol     Hypertension     Thyroid condition        Past Surgical History:   Procedure Laterality Date    APPENDECTOMY      BACK SURGERY      x2    CARDIAC CATHETERIZATION  2016    CATARACT EXTRACTION      CHOLECYSTECTOMY      CORONARY ANGIOPLASTY  09/2015    non-obstructive CAD    EYE SURGERY      HIP SURGERY Bilateral     HYSTERECTOMY      KNEE SURGERY Bilateral     x 2 each       Review of patient's allergies indicates:   Allergen Reactions    Alendronate      Other reaction(s): Joint pain  Other reaction(s): Joint pain    Nitrofurantoin macrocrystalline      Other reaction(s): Unknown    Sulfa (sulfonamide antibiotics)      Other reaction(s): Shortness of breath  Other reaction(s): Hives  Other reaction(s): Flushing (skin)  Other reaction(s): Edema  Other reaction(s): Shortness of breath  Other reaction(s): Hives  Other reaction(s): Flushing (skin)  Other reaction(s): Edema    Warfarin      Other reaction(s): excessive bleeding       No current facility-administered medications on file prior to encounter.      Current Outpatient Prescriptions on File Prior to Encounter   Medication Sig    albuterol 90 mcg/actuation inhaler Inhale 2 puffs into the lungs every 6 (six) hours as needed for Wheezing.    amlodipine (NORVASC) 2.5 MG tablet take 1 tablet by mouth twice a day **PATIENT SAYS THAT IT HAS BEEN CHANGED FROM DAILY**    aspirin 81 mg Tab Take 1 tablet by mouth Daily.    atorvastatin (LIPITOR) 20 MG tablet Take 1 tablet (20 mg total) by mouth every evening.    calcium carbonate-vit D3-min (CALTRATE 600+D PLUS MINERALS) 600 mg (1,500 mg)-400 unit Chew Take 1 tablet by mouth Twice daily.    citalopram (CELEXA) 20 MG tablet Take 1 tablet (20 mg total) by mouth once daily.    fluticasone (FLONASE) 50 mcg/actuation nasal spray 2 sprays by Each Nare route once daily.    levothyroxine  (SYNTHROID) 125 MCG tablet Take 1 tablet (125 mcg total) by mouth once daily.    losartan (COZAAR) 100 MG tablet Take 1 tablet (100 mg total) by mouth once daily.    metoprolol succinate (TOPROL-XL) 100 MG 24 hr tablet take 1  Tab  Po BID    MULTIVITAMIN W-MINERALS/LUTEIN (CENTRUM SILVER ORAL) Take 1 tablet by mouth.    nitroGLYCERIN (NITROSTAT) 0.4 MG SL tablet Place 1 tablet under the tongue. Every 5 min - up to 3    omeprazole (PRILOSEC) 40 MG capsule Take 1 capsule (40 mg total) by mouth once daily.    potassium chloride SA (KLOR-CON M20) 20 MEQ tablet Take 1 tablet (20 mEq total) by mouth 2 (two) times daily.     Family History     Problem Relation (Age of Onset)    Cataracts Mother    Diabetes Sister, Maternal Grandmother    Glaucoma Mother    Heart disease Mother    Hypertension Father        Social History Main Topics    Smoking status: Former Smoker     Packs/day: 1.00     Years: 30.00     Types: Cigarettes     Quit date: 1/1/1983    Smokeless tobacco: Never Used    Alcohol use No    Drug use: No    Sexual activity: Not on file     Review of Systems   Constitutional: Negative.  Negative for activity change, appetite change, fatigue and fever.   HENT: Negative.    Eyes: Negative.    Respiratory: Negative for cough, chest tightness and shortness of breath.    Cardiovascular: Positive for chest pain. Negative for palpitations and leg swelling.   Gastrointestinal: Negative for abdominal pain, nausea and vomiting.   Endocrine: Negative.    Genitourinary: Negative.  Negative for dysuria, frequency and urgency.   Musculoskeletal: Negative.    Skin: Negative.    Allergic/Immunologic: Negative.    Neurological: Negative for dizziness, speech difficulty, weakness, light-headedness and headaches.   Hematological: Negative.    Psychiatric/Behavioral: Positive for confusion.     Objective:     Vital Signs (Most Recent):  Temp: 98 °F (36.7 °C) (07/11/17 1557)  Pulse: 92 (07/11/17 1557)  Resp: 18 (07/11/17  1557)  BP: (!) 153/69 (07/11/17 1557)  SpO2: 95 % (07/11/17 1557) Vital Signs (24h Range):  Temp:  [97.9 °F (36.6 °C)-98 °F (36.7 °C)] 98 °F (36.7 °C)  Pulse:  [84-92] 92  Resp:  [18] 18  SpO2:  [95 %-97 %] 95 %  BP: (151-153)/(69-81) 153/69     Weight: 84.1 kg (185 lb 6.5 oz)  Body mass index is 31.83 kg/m².    Physical Exam   Constitutional: She is oriented to person, place, and time. She appears well-developed and well-nourished.   HENT:   Head: Normocephalic and atraumatic.   Eyes: Conjunctivae and EOM are normal. Pupils are equal, round, and reactive to light.   Neck: Normal range of motion. Neck supple.   Cardiovascular: Normal rate, normal heart sounds and intact distal pulses.  An irregularly irregular rhythm present.   No murmur heard.  Pulmonary/Chest: Effort normal and breath sounds normal.   Abdominal: Soft. Bowel sounds are normal.   Musculoskeletal: Normal range of motion. She exhibits edema (trace edema to nick LE's ).   Neurological: She is alert and oriented to person, place, and time. She has normal reflexes.   Skin: Skin is warm and dry.   Psychiatric: She has a normal mood and affect. Her behavior is normal. Judgment and thought content normal.   Nursing note and vitals reviewed.       Significant Labs:   BMP:   Recent Labs  Lab 07/11/17  1611   *      K 4.3      CO2 24   BUN 17   CREATININE 0.8   CALCIUM 9.6     CBC:   Recent Labs  Lab 07/11/17  1611   WBC 13.13*   HGB 14.6   HCT 42.7        CMP:   Recent Labs  Lab 07/11/17  1611      K 4.3      CO2 24   *   BUN 17   CREATININE 0.8   CALCIUM 9.6   PROT 7.0   ALBUMIN 3.4*   BILITOT 2.3*   ALKPHOS 119   AST 15   ALT 8*   ANIONGAP 10   EGFRNONAA >60     Troponin:   Recent Labs  Lab 07/11/17  1611   TROPONINI 0.027*     All pertinent labs within the past 24 hours have been reviewed.    Significant Imaging:  Imaging Results    None

## 2017-07-11 NOTE — ASSESSMENT & PLAN NOTE
T-0.027  Serial Troponin's  BNP-490  Cardiology consulted  2D-Echo pending   ASA, Morphine PRN, Supplemental O2 to maintain sats > 92%, Nitro SL PRN   4/24/2017 Lipid panel normal, Hemoglobin 5.4  Resumed BB  Telemetry monitoring

## 2017-07-11 NOTE — ASSESSMENT & PLAN NOTE
Cardiology following  Chronic A-fib   Telemetry monitoring   Reviewed pt. Medical record and found that  pt. Was on Coumadin in the past an experienced excessive bleeding.   Pt. Currently is not on anticoagulates   Patient is high risk fall   SCDs will be placed

## 2017-07-11 NOTE — HPI
"Delores M Cryer is an 81 y.o. female patient with PMH of A-Fib, nonobstructive CAD per [facility] in 2015, hypertensive heart disease, hypothyroidism, and dementia.  She was sent to ED by her PCP for c/o chest pain with abnormal EKG.   Substernal chest pain x 2 days, progressively worsening day of arrival.  Pain was "aching" in nature, moderate in intensity, radiated into left shoulder and arm, and nonexertional.  Denies any associated palpitations, SOB/BOWMAN, cough, edema, diaphoresis, N/V, lightheadedness/dizziness, syncope, dysuria, or fever.  Pain worse with deep inspiration.  No alleviating factors.  Upon arrival to ED, EKG showed atrial fibrillation with controlled rate, LV strain pattern with no acute ischemic ST-T changes.  Troponin 0.027, , WBCs 13.13, UA suggestive of UTI.   "

## 2017-07-12 VITALS
HEART RATE: 105 BPM | BODY MASS INDEX: 29.46 KG/M2 | SYSTOLIC BLOOD PRESSURE: 128 MMHG | OXYGEN SATURATION: 98 % | TEMPERATURE: 99 F | DIASTOLIC BLOOD PRESSURE: 67 MMHG | WEIGHT: 183.31 LBS | RESPIRATION RATE: 20 BRPM | HEIGHT: 66 IN

## 2017-07-12 PROBLEM — I51.89 DIASTOLIC DYSFUNCTION: Status: ACTIVE | Noted: 2017-07-12

## 2017-07-12 PROBLEM — R07.9 CHEST PAIN: Status: RESOLVED | Noted: 2017-07-11 | Resolved: 2017-07-12

## 2017-07-12 LAB
DIASTOLIC DYSFUNCTION: NO
MITRAL VALVE REGURGITATION: NORMAL
RETIRED EF AND QEF - SEE NOTES: 60 (ref 55–65)
TROPONIN I SERPL DL<=0.01 NG/ML-MCNC: 0.01 NG/ML

## 2017-07-12 PROCEDURE — 25000003 PHARM REV CODE 250: Performed by: NURSE PRACTITIONER

## 2017-07-12 PROCEDURE — 99219 PR INITIAL OBSERVATION CARE,LEVL II: CPT | Mod: ,,, | Performed by: INTERNAL MEDICINE

## 2017-07-12 PROCEDURE — 87186 SC STD MICRODIL/AGAR DIL: CPT

## 2017-07-12 PROCEDURE — 93306 TTE W/DOPPLER COMPLETE: CPT

## 2017-07-12 PROCEDURE — 87088 URINE BACTERIA CULTURE: CPT

## 2017-07-12 PROCEDURE — 96376 TX/PRO/DX INJ SAME DRUG ADON: CPT

## 2017-07-12 PROCEDURE — 93306 TTE W/DOPPLER COMPLETE: CPT | Mod: 26,,, | Performed by: INTERNAL MEDICINE

## 2017-07-12 PROCEDURE — G0378 HOSPITAL OBSERVATION PER HR: HCPCS

## 2017-07-12 PROCEDURE — 36415 COLL VENOUS BLD VENIPUNCTURE: CPT

## 2017-07-12 PROCEDURE — 84484 ASSAY OF TROPONIN QUANT: CPT

## 2017-07-12 PROCEDURE — 87086 URINE CULTURE/COLONY COUNT: CPT

## 2017-07-12 PROCEDURE — 96374 THER/PROPH/DIAG INJ IV PUSH: CPT

## 2017-07-12 PROCEDURE — 87077 CULTURE AEROBIC IDENTIFY: CPT

## 2017-07-12 RX ORDER — AMOXICILLIN AND CLAVULANATE POTASSIUM 500; 125 MG/1; MG/1
1 TABLET, FILM COATED ORAL 2 TIMES DAILY
Status: DISCONTINUED | OUTPATIENT
Start: 2017-07-12 | End: 2017-07-12 | Stop reason: HOSPADM

## 2017-07-12 RX ORDER — AMLODIPINE BESYLATE 2.5 MG/1
2.5 TABLET ORAL 2 TIMES DAILY
Status: DISCONTINUED | OUTPATIENT
Start: 2017-07-12 | End: 2017-07-12 | Stop reason: HOSPADM

## 2017-07-12 RX ORDER — AMOXICILLIN AND CLAVULANATE POTASSIUM 500; 125 MG/1; MG/1
1 TABLET, FILM COATED ORAL 2 TIMES DAILY
Qty: 20 TABLET | Refills: 0 | Status: SHIPPED | OUTPATIENT
Start: 2017-07-12 | End: 2017-07-22

## 2017-07-12 RX ORDER — ENOXAPARIN SODIUM 100 MG/ML
40 INJECTION SUBCUTANEOUS EVERY 24 HOURS
Status: DISCONTINUED | OUTPATIENT
Start: 2017-07-12 | End: 2017-07-12

## 2017-07-12 RX ORDER — AMOXICILLIN AND CLAVULANATE POTASSIUM 500; 125 MG/1; MG/1
1 TABLET, FILM COATED ORAL 3 TIMES DAILY
Status: DISCONTINUED | OUTPATIENT
Start: 2017-07-12 | End: 2017-07-12

## 2017-07-12 RX ORDER — ISOSORBIDE MONONITRATE 30 MG/1
30 TABLET, EXTENDED RELEASE ORAL DAILY
Status: DISCONTINUED | OUTPATIENT
Start: 2017-07-12 | End: 2017-07-12 | Stop reason: HOSPADM

## 2017-07-12 RX ORDER — ISOSORBIDE MONONITRATE 30 MG/1
30 TABLET, EXTENDED RELEASE ORAL DAILY
Qty: 30 TABLET | Refills: 0 | Status: SHIPPED | OUTPATIENT
Start: 2017-07-12 | End: 2018-12-20

## 2017-07-12 RX ADMIN — AMLODIPINE BESYLATE 2.5 MG: 2.5 TABLET ORAL at 10:07

## 2017-07-12 RX ADMIN — AMOXICILLIN AND CLAVULANATE POTASSIUM 500 MG: 500; 125 TABLET, FILM COATED ORAL at 01:07

## 2017-07-12 RX ADMIN — LOSARTAN POTASSIUM 100 MG: 50 TABLET, FILM COATED ORAL at 10:07

## 2017-07-12 RX ADMIN — ISOSORBIDE MONONITRATE 30 MG: 30 TABLET, EXTENDED RELEASE ORAL at 01:07

## 2017-07-12 RX ADMIN — LEVOTHYROXINE SODIUM 125 MCG: 0.12 TABLET ORAL at 07:07

## 2017-07-12 RX ADMIN — CITALOPRAM HYDROBROMIDE 20 MG: 20 TABLET ORAL at 10:07

## 2017-07-12 RX ADMIN — APIXABAN 5 MG: 2.5 TABLET, FILM COATED ORAL at 11:07

## 2017-07-12 RX ADMIN — METOPROLOL SUCCINATE 100 MG: 50 TABLET, EXTENDED RELEASE ORAL at 10:07

## 2017-07-12 RX ADMIN — SODIUM CHLORIDE, PRESERVATIVE FREE 3 ML: 5 INJECTION INTRAVENOUS at 07:07

## 2017-07-12 NOTE — PLAN OF CARE
Problem: Patient Care Overview  Goal: Plan of Care Review  Outcome: Ongoing (interventions implemented as appropriate)  Free of falls/injury during shift  Minimal c/o pain during shift  UTI being treated w/ abx  Cane @ bedside  Serial troponin trending down  Cards consulted  Afib on telemetry  Safety interventions in place

## 2017-07-12 NOTE — CONSULTS
Consult Note  Cardiology    Consult Requested By: Vale Burnett NP   Reason for Consult: Chest Pain, Abnormal EKG    SUBJECTIVE:     History of Present Illness:  Mr Cryer is a 81 y.o. female presents with PMHx of A Fib, CAD and NSTEMI. She present to [facility] emergency Room after being seen at PCP office for chest pain. Patient has been feeling chest pain for the last 1-2 days. She finds the pain difficult to describe, however note the pain increase with deep breathing. Also has BOWMAN.  Denies associated symptoms of shortness of breath, palpitations syncope or dizziness.  LHC on 9/21/2015 showed non obstructive CAD. EKG: A Fib rate control 83. She been in A Fib for years. Report having some bleeding on Coumadin in the pain, no on any anticoagulation for CVA protection. She has history of falls, however last fall was over one year ago.     Review of patient's allergies indicates:   Allergen Reactions    Alendronate      Other reaction(s): Joint pain  Other reaction(s): Joint pain    Nitrofurantoin macrocrystalline      Other reaction(s): Unknown    Sulfa (sulfonamide antibiotics)      Other reaction(s): Shortness of breath  Other reaction(s): Hives  Other reaction(s): Flushing (skin)  Other reaction(s): Edema  Other reaction(s): Shortness of breath  Other reaction(s): Hives  Other reaction(s): Flushing (skin)  Other reaction(s): Edema    Warfarin      Other reaction(s): excessive bleeding       Past Medical History:   Diagnosis Date    Atrial fibrillation     Coronary artery disease     Elevated cholesterol     Hypertension     Thyroid condition      Past Surgical History:   Procedure Laterality Date    APPENDECTOMY      BACK SURGERY      x2    CARDIAC CATHETERIZATION  2016    CATARACT EXTRACTION      CHOLECYSTECTOMY      CORONARY ANGIOPLASTY  09/2015    non-obstructive CAD    EYE SURGERY      HIP SURGERY Bilateral     HYSTERECTOMY      KNEE SURGERY Bilateral     x 2 each     Family History    Problem Relation Age of Onset    Cataracts Mother     Glaucoma Mother     Heart disease Mother     Diabetes Sister     Diabetes Maternal Grandmother     Hypertension Father      Social History   Substance Use Topics    Smoking status: Former Smoker     Packs/day: 1.00     Years: 30.00     Types: Cigarettes     Quit date: 1/1/1983    Smokeless tobacco: Never Used    Alcohol use No        Review of Systems:  Constitutional: negative for fever or chills   Eyes: negative  Ears, nose, mouth, throat, and face: negative  Respiratory: positive for BOWMAN, negative for shortness of breath, orthopnea or PND  Cardiovascular: positive for chest pain, negative palpitations, syncope or near syncope  Gastrointestinal: negative for nausea or vomiting  Genitourinary:negative  Hematologic/lymphatic: negative  Musculoskeletal:negative,   Neurological: negative  Behavioral/Psych: negative  Endocrine: negative  Allergic/Immunologic: negative    OBJECTIVE:     Vital Signs (Most Recent)  Temp: 98.2 °F (36.8 °C) (07/12/17 1200)  Pulse: 76 (07/12/17 1200)  Resp: 20 (07/12/17 0705)  BP: (!) 148/73 (07/12/17 1200)  SpO2: (!) 93 % (07/12/17 1200)    Vital Signs Range (Last 24H):  Temp:  [97.8 °F (36.6 °C)-98.4 °F (36.9 °C)]   Pulse:  [70-98]   Resp:  [18-22]   BP: (124-153)/(66-90)   SpO2:  [93 %-97 %]     Current Facility-Administered Medications   Medication    acetaminophen tablet 650 mg    albuterol sulfate nebulizer solution 2.5 mg    amlodipine tablet 2.5 mg    amoxicillin-clavulanate 500-125mg per tablet 500 mg    apixaban tablet 5 mg    atorvastatin tablet 20 mg    citalopram tablet 20 mg    isosorbide mononitrate 24 hr tablet 30 mg    levothyroxine tablet 125 mcg    losartan tablet 100 mg    metoclopramide HCl injection 5 mg    metoprolol succinate (TOPROL-XL) 24 hr tablet 100 mg    morphine injection 2 mg    nitroGLYCERIN SL tablet 0.4 mg    ondansetron injection 4 mg    promethazine (PHENERGAN) 6.25 mg in  dextrose 5 % 50 mL IVPB    sodium chloride 0.9% flush 3 mL     No current facility-administered medications on file prior to encounter.      Current Outpatient Prescriptions on File Prior to Encounter   Medication Sig    amlodipine (NORVASC) 2.5 MG tablet take 1 tablet by mouth twice a day **PATIENT SAYS THAT IT HAS BEEN CHANGED FROM DAILY**    aspirin 81 mg Tab Take 1 tablet by mouth Daily.    atorvastatin (LIPITOR) 20 MG tablet Take 1 tablet (20 mg total) by mouth every evening.    calcium carbonate-vit D3-min (CALTRATE 600+D PLUS MINERALS) 600 mg (1,500 mg)-400 unit Chew Take 1 tablet by mouth Twice daily.    citalopram (CELEXA) 20 MG tablet Take 1 tablet (20 mg total) by mouth once daily.    levothyroxine (SYNTHROID) 125 MCG tablet Take 1 tablet (125 mcg total) by mouth once daily.    losartan (COZAAR) 100 MG tablet Take 1 tablet (100 mg total) by mouth once daily.    metoprolol succinate (TOPROL-XL) 100 MG 24 hr tablet take 1  Tab  Po BID    MULTIVITAMIN W-MINERALS/LUTEIN (CENTRUM SILVER ORAL) Take 1 tablet by mouth.    omeprazole (PRILOSEC) 40 MG capsule Take 1 capsule (40 mg total) by mouth once daily.    albuterol 90 mcg/actuation inhaler Inhale 2 puffs into the lungs every 6 (six) hours as needed for Wheezing.    fluticasone (FLONASE) 50 mcg/actuation nasal spray 2 sprays by Each Nare route once daily.    nitroGLYCERIN (NITROSTAT) 0.4 MG SL tablet Place 1 tablet under the tongue. Every 5 min - up to 3    potassium chloride SA (KLOR-CON M20) 20 MEQ tablet Take 1 tablet (20 mEq total) by mouth 2 (two) times daily.       Physical Exam:  General appearance: alert, appears stated age and cooperative  Head: Normocephalic, without obvious abnormality, atraumatic  Eyes:  conjunctivae/corneas clear. PERRL, EOM's intact. Fundi benign.  Nose: no discharge  Throat: normal findings: tongue midline and normal  Neck: no adenopathy, no carotid bruit, no JVD, supple, symmetrical, trachea midline and thyroid  not enlarged, symmetric, no tenderness/mass/nodules  Back:  no skin lesions, erythema, or scars  Lungs:  clear to auscultation bilaterally  Chest wall: no tenderness  Heart: regular rate and rhythm, S1, S2 normal, no murmur, click, rub or gallop  Abdomen: soft, non-tender; bowel sounds normal; no masses,  no organomegaly  Extremities: extremities normal, atraumatic, no cyanosis or edema  Pulses: 2+ and symmetric  Skin: Skin color, texture, turgor normal. No rashes or lesions  Neurologic: Grossly normal    Laboratory:  Chemistry:   Lab Results   Component Value Date     07/11/2017    K 4.3 07/11/2017     07/11/2017    CO2 24 07/11/2017    BUN 17 07/11/2017    CREATININE 0.8 07/11/2017    CALCIUM 9.6 07/11/2017     Cardiac Markers:   Lab Results   Component Value Date    CKMB 2.6 11/23/2010    TROPONINI 0.012 07/12/2017    TROPONINI <0.04 11/23/2010     Cardiac Markers (Last 3):   Lab Results   Component Value Date    CKMB 2.6 11/23/2010    CKMB 3.2 02/17/2010    CKMB 4.1 (H) 02/17/2010    TROPONINI 0.012 07/12/2017    TROPONINI 0.008 07/11/2017    TROPONINI 0.027 (H) 07/11/2017    TROPONINI <0.04 11/23/2010    TROPONINI <0.04 02/17/2010    TROPONINI <0.04 02/17/2010     CBC:   Lab Results   Component Value Date    WBC 13.13 (H) 07/11/2017    HGB 14.6 07/11/2017    HCT 42.7 07/11/2017    MCV 87 07/11/2017     07/11/2017     Lipids:   Lab Results   Component Value Date    CHOL 151 04/24/2017    TRIG 96 04/24/2017    HDL 43 04/24/2017     Coagulation:   Lab Results   Component Value Date    INR 1.0 09/20/2015    INR 1.9 (H) 05/18/2009    APTT 24.2 09/20/2015       Diagnostic Results:  ECG: Reviewed  X-Ray: Reviewed  US: Reviewed  CT: Reviewed  Echo: Reviewed      ASSESSMENT/PLAN:     Patient Active Problem List   Diagnosis    Essential hypertension    Hyperlipidemia    Atrial fibrillation    Thyroid condition    History of opportunistic infections    GERD (gastroesophageal reflux disease)     Osteoarthritis    Acquired hypothyroidism    Falls frequently    Osteoporosis    Old myocardial infarction    NSTEMI (non-ST elevated myocardial infarction)    Coronary artery disease due to calcified coronary lesion    Dementia without behavioral disturbance    Chest pain      Patient experiencing atypical chest pain symptoms. She has history of A Fib with elevated Andrew Vas score, will benefit from anticoagulation for CVA protection.   Explained all NOAC and coumadin, risk and benefit to pt and daughter.     Plan:     Will continue current medications and treatment plan  Risk modification   Stop ASA  Start Eliquis 5 mg BID   Start Imdur 30 mg daily   Follow up in Cardiology clinic in one week     Chart reviewed. Dr. Mitchell agrees with plan as outlined above.

## 2017-07-12 NOTE — PROGRESS NOTES
Discharge instructions given to patient. Reviewed with patient and her spouse  when to take next medication dose. Pt verbalize understanding. Pt instructed to follow up with Dr. Martino in one week. Pt verbalized understanding. Pt given three new prescriptions, Augmentin, Apixaban, and Imdur-sent electronically to her pharmacy in [place]. IV d/c'ed. Telemetry box removed and returned to monitor room. Pt changing clothes at this time.

## 2017-07-12 NOTE — HOSPITAL COURSE
Patient was admitted for observation.  AMI r/o with negative serial cardiac enzymes and unremarkable EKG.  Cardiology consulted.  2D echo showed LVEF of 60-65% with [facility].  Patient remained chest pain free since arrival to ED.  She was started on IV Rocephin for UTI.  Case discussed with Cardiology.  Imdur added for angina.  Eliquis added for anticoagulation given persistent A-Fib with CHADS2-VASc of 4; ASA stopped.  She will be discharged home on Augmentin x 10 days for UTI.  Patient appears comfortable this AM, denies any symptoms.  Patient seen and examined today, and deemed suitable for discharge.  F/U with Dr. Martino in 1-2 weeks.

## 2017-07-12 NOTE — ED NOTES
Spoke with Sumaya on Obs who states she is not ready for the pt and will call me back in a few minutes.

## 2017-07-12 NOTE — DISCHARGE SUMMARY
"[facility] Hospital Medicine  Discharge Summary      Patient Name: Delores M Cryer  MRN: [id number]  Admission Date: 7/11/2017  Hospital Length of Stay: 0 days  Discharge Date and Time:  07/12/2017 4:11 PM  Attending Physician: No att. providers found   Discharging Provider: ISSAC Rebolledo  Primary Care Provider: ANNE LANGE MD      HPI:   Delores M Cryer is an 81 y.o. female patient  with PMH of A-Fib, nonobstructive CAD per [facility] in 2015, hypertensive heart disease, hypothyroidism, and dementia.  She was sent to ED by her PCP for c/o chest pain with abnormal EKG.   Substernal chest pain x 2 days, progressively worsening day of arrival.  Pain was "aching" in nature, moderate in intensity, radiated into left shoulder and arm, and nonexertional.  Denies any associated palpitations, SOB/BOWMAN, cough, edema, diaphoresis, N/V, lightheadedness/dizziness, syncope, dysuria, or fever.  Pain worse with deep inspiration.  No alleviating factors.  Upon arrival to ED, EKG showed atrial fibrillation with controlled rate, LV strain pattern with no acute ischemic ST-T changes.  Troponin 0.027, , WBCs 13.13, UA suggestive of UTI.     * No surgery found *      Indwelling Lines/Drains at time of discharge:   Lines/Drains/Airways          No matching active lines, drains, or airways        Hospital Course:   Patient was admitted for observation.  AMI r/o with negative serial cardiac enzymes and unremarkable EKG.  Cardiology consulted.  2D echo showed LVEF of 60-65% with [facility].  Patient remained chest pain free since arrival to ED.  She was started on IV Rocephin for UTI.  Case discussed with Cardiology.  Imdur added for angina.  Eliquis added for anticoagulation given persistent A-Fib with CHADS2-VASc of 4; ASA stopped.  She will be discharged home on Augmentin x 10 days for UTI.  Patient appears comfortable this AM, denies any symptoms.  Patient seen and examined today, and deemed suitable for " discharge.  F/U with Dr. Martino in 1-2 weeks.      Consults:   Consults         Status Ordering Provider     Inpatient consult to Cardiology  Once     Provider:  Willard Gutierrez NP    Completed MAGDIEL COSBY          Significant Diagnostic Studies: Labs:   CMP   Recent Labs  Lab 07/11/17  1611      K 4.3      CO2 24   *   BUN 17   CREATININE 0.8   CALCIUM 9.6   PROT 7.0   ALBUMIN 3.4*   BILITOT 2.3*   ALKPHOS 119   AST 15   ALT 8*   ANIONGAP 10   ESTGFRAFRICA >60   EGFRNONAA >60   , CBC   Recent Labs  Lab 07/11/17  1611   WBC 13.13*   HGB 14.6   HCT 42.7      , Troponin   Recent Labs  Lab 07/12/17  0412   TROPONINI 0.012    and All labs within the past 24 hours have been reviewed    Pending Diagnostic Studies:     None        Final Active Diagnoses:    Diagnosis Date Noted POA    Diastolic dysfunction [I51.9] 07/12/2017 Yes    Dementia without behavioral disturbance [F03.90] 10/07/2015 Yes    Acquired hypothyroidism [E03.9] 11/01/2014 Yes     Chronic    Essential hypertension [I10] 06/11/2013 Yes     Chronic    Hyperlipidemia [E78.5] 06/11/2013 Yes     Chronic    Atrial fibrillation [I48.91] 06/11/2013 Yes     Chronic      Problems Resolved During this Admission:    Diagnosis Date Noted Date Resolved POA    PRINCIPAL PROBLEM:  Chest pain [R07.9] 07/11/2017 07/12/2017 Yes        Discharged Condition: stable    Disposition: Home or Self Care    Follow Up:  Follow-up Information     Mark Martino MD In 1 week.    Specialties:  Cardiology, INTERVENTIONAL CARDIOLOGY  Contact information:  [address]  [place] [address] [phone]                 Patient Instructions:     Diet Cardiac     Activity as tolerated       Medications:  Reconciled Home Medications:   Discharge Medication List as of 7/12/2017  3:12 PM      START taking these medications    Details   amoxicillin-clavulanate 500-125mg (AUGMENTIN) 500-125 mg Tab Take 1 tablet (500 mg total) by mouth 2  (two) times daily., Starting Wed 7/12/2017, Until Sat 7/22/2017, Normal      apixaban 5 mg Tab Take 1 tablet (5 mg total) by mouth 2 (two) times daily., Starting Wed 7/12/2017, Normal      isosorbide mononitrate (IMDUR) 30 MG 24 hr tablet Take 1 tablet (30 mg total) by mouth once daily., Starting Wed 7/12/2017, Normal         CONTINUE these medications which have NOT CHANGED    Details   albuterol 90 mcg/actuation inhaler Inhale 2 puffs into the lungs every 6 (six) hours as needed for Wheezing., Starting 6/6/2016, Until Discontinued, Normal      amlodipine (NORVASC) 2.5 MG tablet take 1 tablet by mouth twice a day **PATIENT SAYS THAT IT HAS BEEN CHANGED FROM DAILY**, Normal      atorvastatin (LIPITOR) 20 MG tablet Take 1 tablet (20 mg total) by mouth every evening., Starting 4/24/2017, Until Discontinued, Normal      calcium carbonate-vit D3-min (CALTRATE 600+D PLUS MINERALS) 600 mg (1,500 mg)-400 unit Chew Take 1 tablet by mouth Twice daily., Starting 5/12/2012, Until Discontinued, Historical Med      citalopram (CELEXA) 20 MG tablet Take 1 tablet (20 mg total) by mouth once daily., Starting 4/24/2017, Until Discontinued, Normal      fluticasone (FLONASE) 50 mcg/actuation nasal spray 2 sprays by Each Nare route once daily., Starting 4/24/2017, Until Discontinued, Normal      levothyroxine (SYNTHROID) 125 MCG tablet Take 1 tablet (125 mcg total) by mouth once daily., Starting 4/24/2017, Until Discontinued, Normal      losartan (COZAAR) 100 MG tablet Take 1 tablet (100 mg total) by mouth once daily., Starting 4/24/2017, Until Discontinued, Normal      metoprolol succinate (TOPROL-XL) 100 MG 24 hr tablet take 1  Tab  Po BID, Normal      MULTIVITAMIN W-MINERALS/LUTEIN (CENTRUM SILVER ORAL) Take 1 tablet by mouth., Starting 5/12/2012, Until Discontinued, Historical Med      nitroGLYCERIN (NITROSTAT) 0.4 MG SL tablet Place 1 tablet under the tongue. Every 5 min - up to 3, Starting 5/12/2012, Until Discontinued,  Historical Med      omeprazole (PRILOSEC) 40 MG capsule Take 1 capsule (40 mg total) by mouth once daily., Starting 4/24/2017, Until Discontinued, Normal         STOP taking these medications       aspirin 81 mg Tab Comments:   Reason for Stopping:         potassium chloride SA (KLOR-CON M20) 20 MEQ tablet Comments:   Reason for Stopping:             Time spent on the discharge of patient: 35 minutes      ISSAC Rebolledo  Department of Hospital Medicine  [facility]

## 2017-07-12 NOTE — PROGRESS NOTES
Pt transported to first floor exit via wheelchair, transferred to car without incidence. Pt's personal belongings with patient.

## 2017-07-12 NOTE — PLAN OF CARE
CM met with patient regarding discharge planning with daughter at the bedside.  The patient plans to return home where she lives with her spouse.  The daughter is currently responsible for transporting the patient to all appointments.   The patient reports feeling better and looking for to discharging home.  No post hospital needs noted at this time.      07/12/17 1154   Discharge Assessment   Assessment Type Discharge Planning Assessment   Confirmed/corrected address and phone number on facesheet? Yes   Assessment information obtained from? Patient   Expected Length of Stay (days) 1   Communicated expected length of stay with patient/caregiver yes   Prior to hospitilization cognitive status: Alert/Oriented   Prior to hospitalization functional status: Assistive Equipment   Current cognitive status: Alert/Oriented   Current Functional Status: Assistive Equipment   Arrived From home or self-care   Lives With spouse   Able to Return to Prior Arrangements yes   Is patient able to care for self after discharge? Yes   How many people do you have in your home that can help with your care after discharge? 1   Who are your caregiver(s) and their phone number(s)? Morgan Cryer [phone]   Patient's perception of discharge disposition home or selfcare   Readmission Within The Last 30 Days no previous admission in last 30 days   Patient currently being followed by outpatient case management? No   Patient currently receives home health services? No   Does the patient currently use HME? Yes   Patient currently receives private duty nursing? No   Patient currently receives any other outside agency services? No   Equipment Currently Used at Home cane, quad;rollator   Do you have any problems affording any of your prescribed medications? No   Is the patient taking medications as prescribed? yes   Do you have any financial concerns preventing you from receiving the healthcare you need? No   Does the patient have transportation to  healthcare appointments? Yes   Transportation Available car   On Dialysis? No   Does the patient receive services at the [facility]? No   Are there any open cases? No   Discharge Plan A Home   Discharge Plan B Home   Patient/Family In Agreement With Plan yes

## 2017-07-12 NOTE — PROGRESS NOTES
Pt arrived to floor via stretcher per ER RN; awake and alert; ambulated to bed w/ cane and  SBA; oriented to room and call light; telemetry monitor applied; educated on hourly rounding; full assess per flowsheets

## 2017-07-14 LAB — BACTERIA UR CULT: NORMAL

## 2017-09-11 ENCOUNTER — OFFICE VISIT (OUTPATIENT)
Dept: FAMILY MEDICINE | Facility: CLINIC | Age: 82
End: 2017-09-11
Payer: MEDICARE

## 2017-09-11 VITALS
HEART RATE: 95 BPM | DIASTOLIC BLOOD PRESSURE: 80 MMHG | OXYGEN SATURATION: 97 % | WEIGHT: 180.31 LBS | HEIGHT: 66 IN | SYSTOLIC BLOOD PRESSURE: 142 MMHG | BODY MASS INDEX: 28.98 KG/M2 | TEMPERATURE: 97 F

## 2017-09-11 DIAGNOSIS — F03.91 DEMENTIA WITH BEHAVIORAL DISTURBANCE, UNSPECIFIED DEMENTIA TYPE: Primary | ICD-10-CM

## 2017-09-11 PROCEDURE — 3079F DIAST BP 80-89 MM HG: CPT | Mod: S$GLB,,, | Performed by: FAMILY MEDICINE

## 2017-09-11 PROCEDURE — 3008F BODY MASS INDEX DOCD: CPT | Mod: S$GLB,,, | Performed by: FAMILY MEDICINE

## 2017-09-11 PROCEDURE — 1126F AMNT PAIN NOTED NONE PRSNT: CPT | Mod: S$GLB,,, | Performed by: FAMILY MEDICINE

## 2017-09-11 PROCEDURE — 99999 PR PBB SHADOW E&M-EST. PATIENT-LVL III: CPT | Mod: PBBFAC,,, | Performed by: FAMILY MEDICINE

## 2017-09-11 PROCEDURE — 1159F MED LIST DOCD IN RCRD: CPT | Mod: S$GLB,,, | Performed by: FAMILY MEDICINE

## 2017-09-11 PROCEDURE — 99213 OFFICE O/P EST LOW 20 MIN: CPT | Mod: S$GLB,,, | Performed by: FAMILY MEDICINE

## 2017-09-11 PROCEDURE — 3077F SYST BP >= 140 MM HG: CPT | Mod: S$GLB,,, | Performed by: FAMILY MEDICINE

## 2017-09-11 PROCEDURE — 99499 UNLISTED E&M SERVICE: CPT | Mod: S$GLB,,, | Performed by: FAMILY MEDICINE

## 2017-09-11 NOTE — PROGRESS NOTES
Subjective:       Patient ID: Delores M Cryer is a 81 y.o. female.    Chief Complaint: Chest Pain and Shoulder Pain    81 y old female with dementia rj with daughter and  to discuss episodes of confusion . Per daughter who is POA she has been noticing that since father had stroke he has been relaying  Mrs Cryer care to her daughter and has became increasingly  frustrated with having to give her medication and has been trying to do some research in nursing home placement  And he has been making derogatory remarsk about her condition . She is self sufficient with hygiene , she feeds herself . At times she gets confused with close family members not knowing who they are , forgets to take. She is not wandering , nor hallucinating       Review of Systems   Constitutional: Positive for activity change. Negative for unexpected weight change.   HENT: Negative for hearing loss, rhinorrhea and trouble swallowing.    Eyes: Positive for visual disturbance. Negative for discharge.   Respiratory: Positive for chest tightness. Negative for wheezing.    Cardiovascular: Positive for chest pain and palpitations.   Gastrointestinal: Negative for blood in stool, constipation, diarrhea and vomiting.   Endocrine: Positive for polyuria. Negative for polydipsia.   Genitourinary: Positive for difficulty urinating. Negative for dysuria, hematuria and menstrual problem.   Musculoskeletal: Negative for arthralgias, joint swelling and neck pain.   Neurological: Negative for weakness and headaches.   Psychiatric/Behavioral: Positive for confusion and dysphoric mood.       Objective:      Physical Exam   Constitutional: She is oriented to person, place, and time. She appears well-developed and well-nourished. No distress.   HENT:   Head: Normocephalic and atraumatic.   Right Ear: External ear normal.   Left Ear: External ear normal.   Mouth/Throat: No oropharyngeal exudate.   Eyes: Conjunctivae and EOM are normal. Pupils are equal,  round, and reactive to light. Right eye exhibits no discharge. Left eye exhibits no discharge. No scleral icterus.   Neck: Normal range of motion. Neck supple. No JVD present. No tracheal deviation present. No thyromegaly present.   Cardiovascular: Normal rate, regular rhythm and normal heart sounds.  Exam reveals no gallop and no friction rub.    No murmur heard.  Pulmonary/Chest: Effort normal and breath sounds normal. No stridor. No respiratory distress. She has no wheezes. She has no rales. She exhibits no tenderness.   Abdominal: Soft. Bowel sounds are normal. She exhibits no distension. There is no tenderness. There is no rebound and no guarding.   Musculoskeletal: Normal range of motion.   Lymphadenopathy:     She has no cervical adenopathy.   Neurological: She is alert and oriented to person, place, and time.   Skin: Skin is warm and dry. She is not diaphoretic.   Psychiatric: She has a normal mood and affect. Her behavior is normal. Judgment and thought content normal.       Assessment:       1. Dementia with behavioral disturbance, unspecified dementia type        Plan:     Tania was seen today for chest pain and shoulder pain.    Diagnoses and all orders for this visit:    Dementia with behavioral disturbance, unspecified dementia type  -     Ambulatory referral to Home Health     Pt will also f.u with neurology

## 2017-09-13 ENCOUNTER — PATIENT MESSAGE (OUTPATIENT)
Dept: NEUROLOGY | Facility: CLINIC | Age: 82
End: 2017-09-13

## 2017-09-18 RX ORDER — CITALOPRAM 20 MG/1
TABLET, FILM COATED ORAL
Qty: 30 TABLET | Refills: 3 | Status: SHIPPED | OUTPATIENT
Start: 2017-09-18 | End: 2018-01-14 | Stop reason: SDUPTHER

## 2017-10-02 RX ORDER — METOPROLOL SUCCINATE 100 MG/1
TABLET, EXTENDED RELEASE ORAL
Qty: 180 TABLET | Refills: 0 | Status: SHIPPED | OUTPATIENT
Start: 2017-10-02 | End: 2018-04-03 | Stop reason: SDUPTHER

## 2017-10-02 RX ORDER — METOPROLOL SUCCINATE 100 MG/1
TABLET, EXTENDED RELEASE ORAL
Qty: 180 TABLET | Refills: 0 | Status: SHIPPED | OUTPATIENT
Start: 2017-10-02 | End: 2018-07-09 | Stop reason: SDUPTHER

## 2017-10-03 DIAGNOSIS — I10 ESSENTIAL HYPERTENSION: ICD-10-CM

## 2017-10-04 RX ORDER — ATORVASTATIN CALCIUM 20 MG/1
TABLET, FILM COATED ORAL
Qty: 90 TABLET | Refills: 0 | Status: SHIPPED | OUTPATIENT
Start: 2017-10-04 | End: 2018-02-05 | Stop reason: SDUPTHER

## 2017-10-04 RX ORDER — LOSARTAN POTASSIUM 100 MG/1
TABLET ORAL
Qty: 90 TABLET | Refills: 0 | Status: SHIPPED | OUTPATIENT
Start: 2017-10-04 | End: 2018-05-08 | Stop reason: SDUPTHER

## 2017-10-24 ENCOUNTER — TELEPHONE (OUTPATIENT)
Dept: FAMILY MEDICINE | Facility: CLINIC | Age: 82
End: 2017-10-24

## 2017-11-07 ENCOUNTER — PATIENT OUTREACH (OUTPATIENT)
Dept: ADMINISTRATIVE | Facility: HOSPITAL | Age: 82
End: 2017-11-07

## 2017-11-20 RX ORDER — OMEPRAZOLE 40 MG/1
CAPSULE, DELAYED RELEASE ORAL
Qty: 30 CAPSULE | Refills: 4 | Status: SHIPPED | OUTPATIENT
Start: 2017-11-20 | End: 2018-04-15 | Stop reason: SDUPTHER

## 2018-01-15 RX ORDER — CITALOPRAM 20 MG/1
TABLET, FILM COATED ORAL
Qty: 30 TABLET | Refills: 3 | Status: SHIPPED | OUTPATIENT
Start: 2018-01-15 | End: 2018-05-08 | Stop reason: SDUPTHER

## 2018-02-05 RX ORDER — AMLODIPINE BESYLATE 2.5 MG/1
TABLET ORAL
Qty: 180 TABLET | Refills: 5 | OUTPATIENT
Start: 2018-02-05

## 2018-02-05 RX ORDER — AMLODIPINE BESYLATE 2.5 MG/1
TABLET ORAL
Qty: 60 TABLET | Refills: 5 | Status: SHIPPED | OUTPATIENT
Start: 2018-02-05 | End: 2018-02-05 | Stop reason: SDUPTHER

## 2018-02-05 RX ORDER — ATORVASTATIN CALCIUM 20 MG/1
TABLET, FILM COATED ORAL
Qty: 90 TABLET | Refills: 0 | OUTPATIENT
Start: 2018-02-05

## 2018-02-05 RX ORDER — ATORVASTATIN CALCIUM 20 MG/1
TABLET, FILM COATED ORAL
Qty: 90 TABLET | Refills: 0 | Status: SHIPPED | OUTPATIENT
Start: 2018-02-05 | End: 2018-05-23

## 2018-02-05 RX ORDER — AMLODIPINE BESYLATE 2.5 MG/1
TABLET ORAL
Qty: 180 TABLET | Refills: 1 | Status: SHIPPED | OUTPATIENT
Start: 2018-02-05 | End: 2018-05-28 | Stop reason: SDUPTHER

## 2018-02-27 ENCOUNTER — OFFICE VISIT (OUTPATIENT)
Dept: FAMILY MEDICINE | Facility: CLINIC | Age: 83
End: 2018-02-27
Payer: MEDICARE

## 2018-02-27 VITALS
DIASTOLIC BLOOD PRESSURE: 68 MMHG | HEIGHT: 66 IN | HEART RATE: 75 BPM | WEIGHT: 181.69 LBS | SYSTOLIC BLOOD PRESSURE: 130 MMHG | TEMPERATURE: 98 F | BODY MASS INDEX: 29.2 KG/M2 | OXYGEN SATURATION: 94 %

## 2018-02-27 DIAGNOSIS — L84 CORN OF TOE: Primary | ICD-10-CM

## 2018-02-27 PROCEDURE — 1126F AMNT PAIN NOTED NONE PRSNT: CPT | Mod: S$GLB,,, | Performed by: FAMILY MEDICINE

## 2018-02-27 PROCEDURE — 1159F MED LIST DOCD IN RCRD: CPT | Mod: S$GLB,,, | Performed by: FAMILY MEDICINE

## 2018-02-27 PROCEDURE — 99999 PR PBB SHADOW E&M-EST. PATIENT-LVL IV: CPT | Mod: PBBFAC,,, | Performed by: FAMILY MEDICINE

## 2018-02-27 PROCEDURE — 3008F BODY MASS INDEX DOCD: CPT | Mod: S$GLB,,, | Performed by: FAMILY MEDICINE

## 2018-02-27 PROCEDURE — 88305 TISSUE EXAM BY PATHOLOGIST: CPT | Performed by: PATHOLOGY

## 2018-02-27 PROCEDURE — 88305 TISSUE EXAM BY PATHOLOGIST: CPT | Mod: 26,,, | Performed by: PATHOLOGY

## 2018-02-27 PROCEDURE — 99214 OFFICE O/P EST MOD 30 MIN: CPT | Mod: S$GLB,,, | Performed by: FAMILY MEDICINE

## 2018-02-27 NOTE — PROGRESS NOTES
Subjective:       Patient ID: Delores M Cryer is a 82 y.o. female.    Chief Complaint: Foot Problem (sores on left foot)    82 y old female with hyperkeratotic lesion on dorsum of left 3rd toe for months here to discuss treatment recommendations . She has not used any OTC  meds .       Review of Systems   Constitutional: Negative.    HENT: Negative.    Cardiovascular: Negative.    Gastrointestinal: Negative.    Skin: Positive for rash.       Objective:      Physical Exam   Constitutional: She is oriented to person, place, and time. She appears well-developed and well-nourished. No distress.   HENT:   Head: Normocephalic and atraumatic.   Right Ear: External ear normal.   Left Ear: External ear normal.   Mouth/Throat: No oropharyngeal exudate.   Eyes: Conjunctivae and EOM are normal. Pupils are equal, round, and reactive to light. Right eye exhibits no discharge. Left eye exhibits no discharge. No scleral icterus.   Neck: Normal range of motion. Neck supple. No JVD present. No tracheal deviation present. No thyromegaly present.   Cardiovascular: Normal rate, regular rhythm and normal heart sounds.  Exam reveals no gallop and no friction rub.    No murmur heard.  Pulmonary/Chest: Effort normal and breath sounds normal. No stridor. No respiratory distress. She has no wheezes. She has no rales. She exhibits no tenderness.   Abdominal: Soft. Bowel sounds are normal. She exhibits no distension. There is no tenderness. There is no rebound and no guarding.   Musculoskeletal: Normal range of motion.   Feet:   Right Foot:   Skin Integrity: Positive for callus.   Lymphadenopathy:     She has no cervical adenopathy.   Neurological: She is alert and oriented to person, place, and time.   Skin: Skin is warm and dry. She is not diaphoretic.   6 mm in diam well demarcated hyperkeratotic lesion on dorsum of 3rd left toe    Psychiatric: She has a normal mood and affect. Her behavior is normal. Judgment and thought content normal.        Assessment:      Tania was seen today for foot problem.    Diagnoses and all orders for this visit:    Corn of toe        Plan:     Lesion was paired and submitted for path report .   Must likely a corn   Will use salicylic acid  plaster for 2 w . Reassess then   WC and WS were discussed.

## 2018-02-27 NOTE — PATIENT INSTRUCTIONS
"Cut the plaster to the size of the lesion.  ?Leave in place for 48 to 72 hours; keep dry.  ?Tape can be used to keep the patch in place; the patch can be left on all day as long as it is confined to the involved site and does not slip onto unaffected areas.  remove the white "dead" skin with a metal nail file or pumice stone each night before replacing the patch. Use of the patch should stop once the lesion has resolved.    "

## 2018-03-05 ENCOUNTER — TELEPHONE (OUTPATIENT)
Dept: FAMILY MEDICINE | Facility: CLINIC | Age: 83
End: 2018-03-05

## 2018-03-05 NOTE — TELEPHONE ENCOUNTER
----- Message from Moy Chávez sent at 3/5/2018  2:50 PM CST -----  Contact: [phone]  Returning call, please call back at [phone].  Md Paloma

## 2018-03-05 NOTE — TELEPHONE ENCOUNTER
Returned call and spoke with patients daughter. Informed of results. Patients daughter verbalized understanding and stated patient foot doing better.

## 2018-03-15 ENCOUNTER — PES CALL (OUTPATIENT)
Dept: ADMINISTRATIVE | Facility: CLINIC | Age: 83
End: 2018-03-15

## 2018-04-03 RX ORDER — METOPROLOL SUCCINATE 100 MG/1
TABLET, EXTENDED RELEASE ORAL
Qty: 180 TABLET | Refills: 0 | Status: SHIPPED | OUTPATIENT
Start: 2018-04-03 | End: 2018-06-28

## 2018-04-17 RX ORDER — OMEPRAZOLE 40 MG/1
CAPSULE, DELAYED RELEASE ORAL
Qty: 30 CAPSULE | Refills: 4 | Status: SHIPPED | OUTPATIENT
Start: 2018-04-17 | End: 2018-05-08 | Stop reason: SDUPTHER

## 2018-04-25 ENCOUNTER — PATIENT MESSAGE (OUTPATIENT)
Dept: FAMILY MEDICINE | Facility: CLINIC | Age: 83
End: 2018-04-25

## 2018-04-26 ENCOUNTER — PATIENT MESSAGE (OUTPATIENT)
Dept: FAMILY MEDICINE | Facility: CLINIC | Age: 83
End: 2018-04-26

## 2018-05-03 ENCOUNTER — PATIENT MESSAGE (OUTPATIENT)
Dept: FAMILY MEDICINE | Facility: CLINIC | Age: 83
End: 2018-05-03

## 2018-05-03 ENCOUNTER — OFFICE VISIT (OUTPATIENT)
Dept: FAMILY MEDICINE | Facility: CLINIC | Age: 83
End: 2018-05-03
Payer: MEDICARE

## 2018-05-03 VITALS
TEMPERATURE: 99 F | SYSTOLIC BLOOD PRESSURE: 120 MMHG | BODY MASS INDEX: 29.55 KG/M2 | HEIGHT: 66 IN | DIASTOLIC BLOOD PRESSURE: 60 MMHG | OXYGEN SATURATION: 95 % | WEIGHT: 183.88 LBS | HEART RATE: 70 BPM

## 2018-05-03 DIAGNOSIS — I48.20 CHRONIC ATRIAL FIBRILLATION: ICD-10-CM

## 2018-05-03 DIAGNOSIS — N30.01 ACUTE CYSTITIS WITH HEMATURIA: ICD-10-CM

## 2018-05-03 DIAGNOSIS — I21.4 NSTEMI (NON-ST ELEVATED MYOCARDIAL INFARCTION): ICD-10-CM

## 2018-05-03 DIAGNOSIS — E03.9 HYPOTHYROIDISM, UNSPECIFIED TYPE: ICD-10-CM

## 2018-05-03 DIAGNOSIS — F03.90 DEMENTIA WITHOUT BEHAVIORAL DISTURBANCE, UNSPECIFIED DEMENTIA TYPE: Primary | ICD-10-CM

## 2018-05-03 PROCEDURE — 3078F DIAST BP <80 MM HG: CPT | Mod: CPTII,S$GLB,, | Performed by: FAMILY MEDICINE

## 2018-05-03 PROCEDURE — 99999 PR PBB SHADOW E&M-EST. PATIENT-LVL III: CPT | Mod: PBBFAC,,, | Performed by: FAMILY MEDICINE

## 2018-05-03 PROCEDURE — 99499 UNLISTED E&M SERVICE: CPT | Mod: S$GLB,,, | Performed by: FAMILY MEDICINE

## 2018-05-03 PROCEDURE — 3074F SYST BP LT 130 MM HG: CPT | Mod: CPTII,S$GLB,, | Performed by: FAMILY MEDICINE

## 2018-05-03 PROCEDURE — 99214 OFFICE O/P EST MOD 30 MIN: CPT | Mod: S$GLB,,, | Performed by: FAMILY MEDICINE

## 2018-05-03 NOTE — PROGRESS NOTES
Subjective:       Patient ID: Delores M Cryer is a 82 y.o. female.    Chief Complaint: Hospital Follow Up    82 y old female with A fib , carotid  stenosis , dementia , CAD , hypothyroidism here for f[facility]u [facility]  4[facility]24 due to worsening  confusion and slurred speech . CVA was rule out . Confusion was determined to be caused by UTI . Doing well . Getting Home health  thru [facility] . Eats well , no dysuria  . She has a caregiver 3 times weekly that  cooks and cleans . Independent with hygiene, no agitation. Previously on Namenda which did not offer any benefit . So it was d./c . She was offered  exelon or Arcet however  family refused it .       Review of Systems   Constitutional: Positive for activity change. Negative for unexpected weight change.   HENT: Positive for trouble swallowing. Negative for hearing loss and rhinorrhea.    Eyes: Negative for discharge and visual disturbance.   Respiratory: Negative for chest tightness and wheezing.    Cardiovascular: Positive for palpitations. Negative for chest pain.   Gastrointestinal: Positive for vomiting. Negative for blood in stool, constipation and diarrhea.   Endocrine: Positive for polyuria. Negative for polydipsia.   Genitourinary: Positive for difficulty urinating and dysuria. Negative for hematuria and menstrual problem.   Musculoskeletal: Positive for arthralgias and joint swelling. Negative for neck pain.   Neurological: Positive for weakness and headaches.   Psychiatric/Behavioral: Positive for confusion. Negative for dysphoric mood.       Objective:      Physical Exam   Constitutional: She is oriented to person, place, and time. She appears well-developed and well-nourished. No distress.   HENT:   Head: Normocephalic and atraumatic.   Right Ear: External ear normal.   Left Ear: External ear normal.   Mouth/Throat: No oropharyngeal exudate.   Eyes: Conjunctivae and EOM are normal. Pupils are equal, round, and reactive to light. Right eye exhibits no  discharge. Left eye exhibits no discharge. No scleral icterus.   Neck: Normal range of motion. Neck supple. No JVD present. No tracheal deviation present. No thyromegaly present.   Cardiovascular: Normal rate, regular rhythm and normal heart sounds.  Exam reveals no gallop and no friction rub.    No murmur heard.  Pulmonary/Chest: Effort normal and breath sounds normal. No stridor. No respiratory distress. She has no wheezes. She has no rales. She exhibits no tenderness.   Abdominal: Soft. Bowel sounds are normal. She exhibits no distension. There is no tenderness. There is no rebound and no guarding.   Musculoskeletal: Normal range of motion.   Lymphadenopathy:     She has no cervical adenopathy.   Neurological: She is alert and oriented to person, place, and time.   Skin: Skin is warm and dry. She is not diaphoretic.   Psychiatric: She has a normal mood and affect. Her speech is normal and behavior is normal. Judgment and thought content normal. She exhibits abnormal recent memory and abnormal remote memory.       Assessment:      Tania was seen today for hospital follow up.    Diagnoses and all orders for this visit:    Dementia without behavioral disturbance, unspecified dementia type    Chronic atrial fibrillation    NSTEMI (non-ST elevated myocardial infarction)    Acute cystitis with hematuria    Hypothyroidism, unspecified type      Plan:   Stable . Due for neuro f.u   Cont eliquis   Sable . Cont med   Resolved   Controlled cont me d  Will obtain  ds

## 2018-05-08 ENCOUNTER — TELEPHONE (OUTPATIENT)
Dept: FAMILY MEDICINE | Facility: CLINIC | Age: 83
End: 2018-05-08

## 2018-05-08 DIAGNOSIS — I10 ESSENTIAL HYPERTENSION: ICD-10-CM

## 2018-05-09 RX ORDER — OMEPRAZOLE 40 MG/1
CAPSULE, DELAYED RELEASE ORAL
Qty: 90 CAPSULE | Refills: 1 | Status: SHIPPED | OUTPATIENT
Start: 2018-05-09 | End: 2018-11-12 | Stop reason: SDUPTHER

## 2018-05-09 RX ORDER — CITALOPRAM 20 MG/1
TABLET, FILM COATED ORAL
Qty: 30 TABLET | Refills: 0 | Status: SHIPPED | OUTPATIENT
Start: 2018-05-09 | End: 2018-05-10 | Stop reason: SDUPTHER

## 2018-05-09 RX ORDER — LEVOTHYROXINE SODIUM 75 UG/1
TABLET ORAL
Qty: 30 TABLET | Refills: 0 | Status: SHIPPED | OUTPATIENT
Start: 2018-05-09 | End: 2018-05-10 | Stop reason: SDUPTHER

## 2018-05-09 RX ORDER — LOSARTAN POTASSIUM 100 MG/1
TABLET ORAL
Qty: 30 TABLET | Refills: 0 | Status: SHIPPED | OUTPATIENT
Start: 2018-05-09 | End: 2018-05-10 | Stop reason: SDUPTHER

## 2018-05-10 DIAGNOSIS — I10 ESSENTIAL HYPERTENSION: ICD-10-CM

## 2018-05-10 RX ORDER — CITALOPRAM 20 MG/1
TABLET, FILM COATED ORAL
Qty: 90 TABLET | Refills: 0 | Status: SHIPPED | OUTPATIENT
Start: 2018-05-10 | End: 2018-06-11 | Stop reason: SDUPTHER

## 2018-05-10 RX ORDER — LEVOTHYROXINE SODIUM 75 UG/1
TABLET ORAL
Qty: 90 TABLET | Refills: 0 | Status: SHIPPED | OUTPATIENT
Start: 2018-05-10 | End: 2018-05-28

## 2018-05-10 RX ORDER — LOSARTAN POTASSIUM 100 MG/1
TABLET ORAL
Qty: 90 TABLET | Refills: 0 | Status: SHIPPED | OUTPATIENT
Start: 2018-05-10 | End: 2018-05-28

## 2018-05-23 ENCOUNTER — HOSPITAL ENCOUNTER (EMERGENCY)
Facility: HOSPITAL | Age: 83
Discharge: HOME | End: 2018-05-23
Attending: EMERGENCY MEDICINE
Payer: MEDICARE

## 2018-05-23 ENCOUNTER — TELEPHONE (OUTPATIENT)
Dept: FAMILY MEDICINE | Facility: CLINIC | Age: 83
End: 2018-05-23

## 2018-05-23 VITALS
BODY MASS INDEX: 33.13 KG/M2 | TEMPERATURE: 98 F | OXYGEN SATURATION: 95 % | WEIGHT: 180 LBS | RESPIRATION RATE: 21 BRPM | HEART RATE: 87 BPM | SYSTOLIC BLOOD PRESSURE: 130 MMHG | HEIGHT: 62 IN | DIASTOLIC BLOOD PRESSURE: 65 MMHG

## 2018-05-23 DIAGNOSIS — I10 HYPERTENSION, UNSPECIFIED TYPE: ICD-10-CM

## 2018-05-23 DIAGNOSIS — R07.9 CHEST PAIN: ICD-10-CM

## 2018-05-23 DIAGNOSIS — N39.0 URINARY TRACT INFECTION WITHOUT HEMATURIA, SITE UNSPECIFIED: Primary | ICD-10-CM

## 2018-05-23 LAB
ALBUMIN SERPL BCP-MCNC: 3.2 G/DL
ALP SERPL-CCNC: 356 U/L
ALT SERPL W/O P-5'-P-CCNC: 58 U/L
ANION GAP SERPL CALC-SCNC: 7 MMOL/L
AST SERPL-CCNC: 62 U/L
BACTERIA #/AREA URNS HPF: ABNORMAL /HPF
BASOPHILS # BLD AUTO: 0.02 K/UL
BASOPHILS NFR BLD: 0.3 %
BILIRUB SERPL-MCNC: 1.4 MG/DL
BILIRUB UR QL STRIP: NEGATIVE
BNP SERPL-MCNC: 425 PG/ML
BUN SERPL-MCNC: 10 MG/DL
CALCIUM SERPL-MCNC: 9 MG/DL
CHLORIDE SERPL-SCNC: 108 MMOL/L
CLARITY UR: ABNORMAL
CO2 SERPL-SCNC: 26 MMOL/L
COLOR UR: YELLOW
CREAT SERPL-MCNC: 0.7 MG/DL
DIFFERENTIAL METHOD: NORMAL
EOSINOPHIL # BLD AUTO: 0.2 K/UL
EOSINOPHIL NFR BLD: 2.5 %
ERYTHROCYTE [DISTWIDTH] IN BLOOD BY AUTOMATED COUNT: 13.8 %
EST. GFR  (AFRICAN AMERICAN): >60 ML/MIN/1.73 M^2
EST. GFR  (NON AFRICAN AMERICAN): >60 ML/MIN/1.73 M^2
GLUCOSE SERPL-MCNC: 95 MG/DL
GLUCOSE UR QL STRIP: NEGATIVE
HCT VFR BLD AUTO: 43.2 %
HGB BLD-MCNC: 14.2 G/DL
HGB UR QL STRIP: ABNORMAL
KETONES UR QL STRIP: NEGATIVE
LEUKOCYTE ESTERASE UR QL STRIP: ABNORMAL
LYMPHOCYTES # BLD AUTO: 2 K/UL
LYMPHOCYTES NFR BLD: 26.2 %
MCH RBC QN AUTO: 29.5 PG
MCHC RBC AUTO-ENTMCNC: 32.9 G/DL
MCV RBC AUTO: 90 FL
MICROSCOPIC COMMENT: ABNORMAL
MONOCYTES # BLD AUTO: 0.8 K/UL
MONOCYTES NFR BLD: 10 %
NEUTROPHILS # BLD AUTO: 4.7 K/UL
NEUTROPHILS NFR BLD: 61 %
NITRITE UR QL STRIP: NEGATIVE
PH UR STRIP: 6 [PH] (ref 5–8)
PLATELET # BLD AUTO: 183 K/UL
PMV BLD AUTO: 11.1 FL
POTASSIUM SERPL-SCNC: 3.9 MMOL/L
PROT SERPL-MCNC: 6.3 G/DL
PROT UR QL STRIP: NEGATIVE
RBC # BLD AUTO: 4.81 M/UL
RBC #/AREA URNS HPF: 4 /HPF (ref 0–4)
SODIUM SERPL-SCNC: 141 MMOL/L
SP GR UR STRIP: 1.01 (ref 1–1.03)
SQUAMOUS #/AREA URNS HPF: 5 /HPF
TROPONIN I SERPL DL<=0.01 NG/ML-MCNC: 0.01 NG/ML
URN SPEC COLLECT METH UR: ABNORMAL
UROBILINOGEN UR STRIP-ACNC: NEGATIVE EU/DL
WBC # BLD AUTO: 7.7 K/UL
WBC #/AREA URNS HPF: 33 /HPF (ref 0–5)

## 2018-05-23 PROCEDURE — 93005 ELECTROCARDIOGRAM TRACING: CPT

## 2018-05-23 PROCEDURE — 85025 COMPLETE CBC W/AUTO DIFF WBC: CPT

## 2018-05-23 PROCEDURE — 93010 ELECTROCARDIOGRAM REPORT: CPT | Mod: ,,, | Performed by: INTERNAL MEDICINE

## 2018-05-23 PROCEDURE — 25000003 PHARM REV CODE 250: Performed by: EMERGENCY MEDICINE

## 2018-05-23 PROCEDURE — 81000 URINALYSIS NONAUTO W/SCOPE: CPT

## 2018-05-23 PROCEDURE — 96374 THER/PROPH/DIAG INJ IV PUSH: CPT

## 2018-05-23 PROCEDURE — 84484 ASSAY OF TROPONIN QUANT: CPT

## 2018-05-23 PROCEDURE — 63600175 PHARM REV CODE 636 W HCPCS: Performed by: EMERGENCY MEDICINE

## 2018-05-23 PROCEDURE — 99284 EMERGENCY DEPT VISIT MOD MDM: CPT | Mod: 25

## 2018-05-23 PROCEDURE — 83880 ASSAY OF NATRIURETIC PEPTIDE: CPT

## 2018-05-23 PROCEDURE — 80053 COMPREHEN METABOLIC PANEL: CPT

## 2018-05-23 RX ORDER — CIPROFLOXACIN 500 MG/1
500 TABLET ORAL
Status: COMPLETED | OUTPATIENT
Start: 2018-05-23 | End: 2018-05-23

## 2018-05-23 RX ORDER — ATORVASTATIN CALCIUM 40 MG/1
40 TABLET, FILM COATED ORAL NIGHTLY
Refills: 0 | COMMUNITY
Start: 2018-05-20 | End: 2018-06-18 | Stop reason: SDUPTHER

## 2018-05-23 RX ORDER — CIPROFLOXACIN 500 MG/1
500 TABLET ORAL 2 TIMES DAILY
Qty: 20 TABLET | Refills: 0 | Status: SHIPPED | OUTPATIENT
Start: 2018-05-23 | End: 2018-06-02

## 2018-05-23 RX ORDER — HYDRALAZINE HYDROCHLORIDE 20 MG/ML
20 INJECTION INTRAMUSCULAR; INTRAVENOUS
Status: COMPLETED | OUTPATIENT
Start: 2018-05-23 | End: 2018-05-23

## 2018-05-23 RX ORDER — CLONIDINE HYDROCHLORIDE 0.2 MG/1
0.2 TABLET ORAL
Status: COMPLETED | OUTPATIENT
Start: 2018-05-23 | End: 2018-05-23

## 2018-05-23 RX ADMIN — HYDRALAZINE HYDROCHLORIDE 20 MG: 20 INJECTION INTRAMUSCULAR; INTRAVENOUS at 05:05

## 2018-05-23 RX ADMIN — CLONIDINE HYDROCHLORIDE 0.2 MG: 0.2 TABLET ORAL at 05:05

## 2018-05-23 RX ADMIN — CIPROFLOXACIN HYDROCHLORIDE 500 MG: 500 TABLET, FILM COATED ORAL at 07:05

## 2018-05-23 NOTE — TELEPHONE ENCOUNTER
Spoke with Dolly from Home Health patient BP is 188/100, Dolly stated that patient took her morning medicine Toprol -Xl 100 mg. Dolly was asked if patient took her Norvasc 2.5 mg and she is also to be on Cozaar 100 mg. Dolly is calling patient daughter to ask about current medicines and will call me back. Dolly called back and stated that the daughter said they DC'd her Norvasc and Cozaar in the hospital in April. Dolly told to instruct the patient to go to the ER.

## 2018-05-23 NOTE — ED PROVIDER NOTES
"SCRIBE #1 NOTE: ICe, am scribing for, and in the presence of, Natanael Ly Jr., MD. I have scribed the entire note.     SCRIBE #2 NOTE: Ce ROBLERO , am scribing for, and in the presence of,  Niranjan Dorantes Jr., MD. I have scribed the remaining portions of the note not scribed by Scribe #1.     History      Chief Complaint   Patient presents with    Fall     " hit her head on monday""    Hypertension     " HTN after a fall per her home health"       Review of patient's allergies indicates:   Allergen Reactions    Alendronate      Other reaction(s): Joint pain  Other reaction(s): Joint pain    Nitrofurantoin macrocrystalline      Other reaction(s): Unknown    Sulfa (sulfonamide antibiotics)      Other reaction(s): Shortness of breath  Other reaction(s): Hives  Other reaction(s): Flushing (skin)  Other reaction(s): Edema  Other reaction(s): Shortness of breath  Other reaction(s): Hives  Other reaction(s): Flushing (skin)  Other reaction(s): Edema    Warfarin      Other reaction(s): excessive bleeding        HPI   HPI    5/23/2018, 4:20 PM   History obtained from the patient      History of Present Illness: Delores M Cryer is a 82 y.o. female patient with PMHx of HTN who presents to the Emergency Department for posterior head pain secondary to a fall which onset 2 days ago. Symptoms are constant and moderate in severity.  No mitigating or exacerbating factors reported. No associated sxs include reported. Patient denies any  LOC,  HA, dizziness, back pain, neck pain, knee pain, hip pain, abd pain, CP, SOB and all other sxs at this time. Pt states she was seen at the [facility] 4 weeks ago and was diagnosed with a TIA and UTI. No prior Tx reported. No further complaints or concerns at this time.         Arrival mode: Personal vehicle    PCP: ANNE LANGE MD       Past Medical History:  Past Medical History:   Diagnosis Date    Atrial fibrillation     Coronary artery disease     " Elevated cholesterol     Hypertension     Thyroid condition        Past Surgical History:  Past Surgical History:   Procedure Laterality Date    APPENDECTOMY      BACK SURGERY      x2    CARDIAC CATHETERIZATION  2016    CATARACT EXTRACTION      CHOLECYSTECTOMY      CORONARY ANGIOPLASTY  09/2015    non-obstructive CAD    EYE SURGERY      HIP SURGERY Bilateral     HYSTERECTOMY      KNEE SURGERY Bilateral     x 2 each         Family History:  Family History   Problem Relation Age of Onset    Cataracts Mother     Glaucoma Mother     Heart disease Mother     Diabetes Sister     Diabetes Maternal Grandmother     Hypertension Father        Social History:  Social History     Social History Main Topics    Smoking status: Former Smoker     Packs/day: 1.00     Years: 30.00     Types: Cigarettes     Quit date: 1/1/1983    Smokeless tobacco: Never Used    Alcohol use No    Drug use: No    Sexual activity: Unknown        ROS   Review of Systems   Constitutional: Negative for activity change, appetite change, chills, diaphoresis and fever.   HENT: Negative for congestion, drooling, ear pain, mouth sores, rhinorrhea, sinus pain, sore throat and trouble swallowing.    Eyes: Negative for pain and discharge.   Respiratory: Negative for cough, chest tightness, shortness of breath, wheezing and stridor.    Cardiovascular: Negative for chest pain, palpitations and leg swelling.   Gastrointestinal: Negative for abdominal distention, abdominal pain, blood in stool, constipation, diarrhea, nausea and vomiting.   Genitourinary: Negative for difficulty urinating, dysuria, flank pain, frequency, hematuria and urgency.   Musculoskeletal: Negative for arthralgias, back pain, myalgias, neck pain and neck stiffness.   Skin: Negative for pallor, rash and wound.   Neurological: Negative for dizziness, syncope, weakness, light-headedness and numbness.        (-) LOC  (+) Head injury    All other systems reviewed and are  "negative.      Physical Exam      Initial Vitals [05/23/18 1553]   BP Pulse Resp Temp SpO2   (!) 215/120 77 18 97.9 °F (36.6 °C) 96 %      MAP       151.67          Physical Exam  Nursing Notes and Vital Signs Reviewed.  Constitutional: Patient is in no acute distress. Well-developed and well-nourished.  Head: Atraumatic. Normocephalic. Tenderness to posterior occipital.    Eyes: PERRL. EOM intact. Conjunctivae are not pale. No scleral icterus.  ENT: Mucous membranes are moist. Oropharynx is clear and symmetric.    Neck: Supple. Full ROM. No lymphadenopathy. Cervical spinal tenderness.   Cardiovascular: Regular rate. Regular rhythm. No murmurs, rubs, or gallops. Distal pulses are 2+ and symmetric.  Pulmonary/Chest: No respiratory distress. Clear to auscultation bilaterall. No wheezing or rales.  Abdominal: Soft and non-distended.  There is no tenderness.  No rebound, guarding, or rigidity. Good bowel sounds.  Musculoskeletal: Moves all extremities. No obvious deformities. No edema. No calf tenderness.  Skin: Warm and dry.Echymosis L forearm and L tricep area.   Neurological:  Alert, awake, and appropriate.  Normal speech.  No acute focal neurological deficits are appreciated.  Psychiatric: Normal affect. Good eye contact. Appropriate in content.    ED Course    Procedures  ED Vital Signs:  Vitals:    05/23/18 1553 05/23/18 1620 05/23/18 1624 05/23/18 1702   BP: (!) 215/120   (!) 233/114   Pulse: 77  79 88   Resp: 18 16     Temp: 97.9 °F (36.6 °C)      TempSrc: Oral      SpO2: 96% 96%  97%   Weight: 81.6 kg (180 lb)      Height: 5' 2" (1.575 m)       05/23/18 1738 05/23/18 1800 05/23/18 1830 05/23/18 1902   BP: (!) 236/108 (!) 155/88 117/62 130/65   Pulse:  98 91 87   Resp:  17 17 (!) 21   Temp:  97.9 °F (36.6 °C)     TempSrc:  Oral     SpO2:  97% 95% 95%   Weight:       Height:        05/23/18 1928   BP:    Pulse:    Resp:    Temp: 97.9 °F (36.6 °C)   TempSrc:    SpO2:    Weight:    Height:        Abnormal Lab " Results:  Labs Reviewed   COMPREHENSIVE METABOLIC PANEL - Abnormal; Notable for the following:        Result Value    Albumin 3.2 (*)     Total Bilirubin 1.4 (*)     Alkaline Phosphatase 356 (*)     AST 62 (*)     ALT 58 (*)     Anion Gap 7 (*)     All other components within normal limits   B-TYPE NATRIURETIC PEPTIDE - Abnormal; Notable for the following:      (*)     All other components within normal limits   URINALYSIS - Abnormal; Notable for the following:     Appearance, UA Hazy (*)     Occult Blood UA 2+ (*)     Leukocytes, UA 1+ (*)     All other components within normal limits   URINALYSIS MICROSCOPIC - Abnormal; Notable for the following:     WBC, UA 33 (*)     Bacteria, UA Many (*)     All other components within normal limits   CBC W/ AUTO DIFFERENTIAL   TROPONIN I   TROPONIN I        All Lab Results:  Results for orders placed or performed during the hospital encounter of 05/23/18   CBC auto differential   Result Value Ref Range    WBC 7.70 3.90 - 12.70 K/uL    RBC 4.81 4.00 - 5.40 M/uL    Hemoglobin 14.2 12.0 - 16.0 g/dL    Hematocrit 43.2 37.0 - 48.5 %    MCV 90 82 - 98 fL    MCH 29.5 27.0 - 31.0 pg    MCHC 32.9 32.0 - 36.0 g/dL    RDW 13.8 11.5 - 14.5 %    Platelets 183 150 - 350 K/uL    MPV 11.1 9.2 - 12.9 fL    Gran # (ANC) 4.7 1.8 - 7.7 K/uL    Lymph # 2.0 1.0 - 4.8 K/uL    Mono # 0.8 0.3 - 1.0 K/uL    Eos # 0.2 0.0 - 0.5 K/uL    Baso # 0.02 0.00 - 0.20 K/uL    Gran% 61.0 38.0 - 73.0 %    Lymph% 26.2 18.0 - 48.0 %    Mono% 10.0 4.0 - 15.0 %    Eosinophil% 2.5 0.0 - 8.0 %    Basophil% 0.3 0.0 - 1.9 %    Differential Method Automated    Comprehensive metabolic panel   Result Value Ref Range    Sodium 141 136 - 145 mmol/L    Potassium 3.9 3.5 - 5.1 mmol/L    Chloride 108 95 - 110 mmol/L    CO2 26 23 - 29 mmol/L    Glucose 95 70 - 110 mg/dL    BUN, Bld 10 8 - 23 mg/dL    Creatinine 0.7 0.5 - 1.4 mg/dL    Calcium 9.0 8.7 - 10.5 mg/dL    Total Protein 6.3 6.0 - 8.4 g/dL    Albumin 3.2 (L) 3.5 -  5.2 g/dL    Total Bilirubin 1.4 (H) 0.1 - 1.0 mg/dL    Alkaline Phosphatase 356 (H) 55 - 135 U/L    AST 62 (H) 10 - 40 U/L    ALT 58 (H) 10 - 44 U/L    Anion Gap 7 (L) 8 - 16 mmol/L    eGFR if African American >60 >60 mL/min/1.73 m^2    eGFR if non African American >60 >60 mL/min/1.73 m^2   Troponin I #1   Result Value Ref Range    Troponin I 0.009 0.000 - 0.026 ng/mL   B-Type natriuretic peptide (BNP)   Result Value Ref Range     (H) 0 - 99 pg/mL   Urinalysis Clean Catch   Result Value Ref Range    Specimen UA Urine, Clean Catch     Color, UA Yellow Yellow, Straw, Angella    Appearance, UA Hazy (A) Clear    pH, UA 6.0 5.0 - 8.0    Specific Gravity, UA 1.010 1.005 - 1.030    Protein, UA Negative Negative    Glucose, UA Negative Negative    Ketones, UA Negative Negative    Bilirubin (UA) Negative Negative    Occult Blood UA 2+ (A) Negative    Nitrite, UA Negative Negative    Urobilinogen, UA Negative <2.0 EU/dL    Leukocytes, UA 1+ (A) Negative   Urinalysis Microscopic   Result Value Ref Range    RBC, UA 4 0 - 4 /hpf    WBC, UA 33 (H) 0 - 5 /hpf    Bacteria, UA Many (A) None-Occ /hpf    Squam Epithel, UA 5 /hpf    Microscopic Comment SEE COMMENT          Imaging Results:  Imaging Results          CT Head Without Contrast (Final result)  Result time 05/23/18 17:31:30    Final result by Christy Mazariegos MD (05/23/18 17:31:30)                 Impression:      No CT evidence of acute intracranial abnormality.    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    All CT scans at this facility use dose modulation, iterative reconstruction and/or weight based dosing when appropriate to reduce radiation dose to as low as reasonably achievable.      Electronically signed by: Christy Mazariegos MD  Date:    05/23/2018  Time:    17:31             Narrative:    EXAMINATION:  CT HEAD WITHOUT CONTRAST    CLINICAL HISTORY:  Headache, post trauma;    TECHNIQUE:  Axial CT imaging was performed through  the head without intravenous contrast.    COMPARISON:  None    FINDINGS:  No hydrocephalus, midline shift, mass effect, or acute intracranial hemorrhage. Cortical sulcal pattern and gray-white matter differentiation is normal.  The there is a chronic appearing right posterior parietal infarct.  Basilar cisterns and posterior fossa are normal. The visualized paranasal sinuses and mastoid air cells are clear. The skull is intact.                               X-Ray Chest AP Portable (Final result)  Result time 05/23/18 17:42:40    Final result by Christy Mazariegos MD (05/23/18 17:42:40)                 Impression:      Bibasilar atelectasis.  Otherwise, no significant abnormalities.      Electronically signed by: Christy Mazariegos MD  Date:    05/23/2018  Time:    17:42             Narrative:    EXAMINATION:  XR CHEST AP PORTABLE    CLINICAL HISTORY:  Chest Pain;    TECHNIQUE:  Single frontal view of the chest was performed.    COMPARISON:  None    FINDINGS:  Cardiomediastinal structures are stable.  There is aortic calcified plaque.  There is persistent bibasilar atelectasis.  Osseous structures are stable.                               The EKG was ordered, reviewed, and independently interpreted by the ED provider.  Interpretation time: 16:38  Rate: 67 BPM  Rhythm: atrial fibrillation  Interpretation: No STEMI.             The Emergency Provider reviewed the vital signs and test results, which are outlined above.    ED Discussion     7:23 PM: Reassessed pt at this time. Discussed with pt all pertinent ED information and results. Discussed pt dx and plan of tx. Gave pt all f/u and return to the ED instructions. All questions and concerns were addressed at this time. Pt expresses understanding of information and instructions, and is comfortable with plan to discharge. Pt is stable for discharge.    For URINARY TRACT INFECTION, I instructed patient to avoid holding in urine and recommended that she urinate  when she feels the urge.  Also advised patient to drink plenty of liquids and reminded patient that she may need to drink more fluids than usual to help flush out the bacteria. Instructed patient to avoid alcohol, caffeine, and citrus juices as these substances can irritate your bladder and increase your symptoms.  Also recommended that patient apply heat to lower abdomen for 20 to 30 minutes every two hours to help decrease discomfort and pressure in the bladder.    Regarding HYPERTENSION, I advised patient to: keep a record of blood pressure results; take your blood pressure medication exactly as directed without skipping doses; avoid medications that contain heart stimulants, including over-the-counter drugs such as decongestants; maintain a healthy weight; cut back on sodium intake (i.e., limit canned, dried, packaged, and fast foods and dont add salt to food); follow the DASH (Dietary Approaches to Stop Hypertension) eating plan which recommends vegetables, fruits, whole gains, and other heart healthy foods; begin an exercise program that includes  aerobic exercise 3 to 4 times a week for an average of 40 minutes at a time (with approval of cardiologist or primary care provider); limit drinks that contain alcohol and caffeine; control levels of emotional stress; and seek emergency care for any shortness of breath, chest pain, difficulty speaking, confusion, or visual changes.        ED Medication(s):  Medications   hydrALAZINE injection 20 mg (20 mg Intravenous Given 5/23/18 1738)   cloNIDine tablet 0.2 mg (0.2 mg Oral Given 5/23/18 1740)   ciprofloxacin HCl tablet 500 mg (500 mg Oral Given 5/23/18 1949)       Discharge Medication List as of 5/23/2018  7:46 PM      START taking these medications    Details   ciprofloxacin HCl (CIPRO) 500 MG tablet Take 1 tablet (500 mg total) by mouth 2 (two) times daily., Starting Wed 5/23/2018, Until Sat 6/2/2018, Print             Follow-up Information     ANNE  MD ANISA. Schedule an appointment as soon as possible for a visit in 1 week.    Specialty:  Family Medicine  Contact information:  [address]  [facility] [address]  [phone]             [facility] - Cardiology. Schedule an appointment as soon as possible for a visit in 1 week.    Specialty:  Cardiology  Why:  or your cardiologist  Contact information:  [address] [facility] [address] [phone]  Additional information:  [address] 3rd floor           [facility]. Schedule an appointment as soon as possible for a visit in 1 week.    Contact information:  [address] [facility] [address]  [phone]             [facility] - .    Specialty:  Emergency Medicine  Why:  As needed, If symptoms worsen  Contact information:  [address] [facility] [address] [phone]                   Medical Decision Making    Medical Decision Making:   Clinical Tests:   Lab Tests: Ordered and Reviewed  Radiological Study: Reviewed and Ordered  Medical Tests: Reviewed and Ordered           Scribe Attestation:   Scribe #1: I performed the above scribed service and the documentation accurately describes the services I performed. I attest to the accuracy of the note.    Attending:   Physician Attestation Statement for Scribe #1: I, Natanael Ly Jr., MD, personally performed the services described in this documentation, as scribed by Ce Rosa, in my presence, and it is both accurate and complete.       Scribe Attestation:   Scribe #2: I performed the above scribed service and the documentation accurately describes the services I performed. I attest to the accuracy of the note.    Attending Attestation:           Physician Attestation for Scribe:    Physician Attestation Statement for Scribe #2: I, Niranjan Dorantes Jr., MD, reviewed documentation, as scribed by Ce Rosa  in my presence, and it is both accurate and  complete. I also acknowledge and confirm the content of the note done by Nancyibe #1.          Clinical Impression       ICD-10-CM ICD-9-CM   1. Urinary tract infection without hematuria, site unspecified N39.0 599.0   2. Chest pain R07.9 786.50   3. Hypertension, unspecified type I10 401.9       Disposition:   Disposition: Discharged  Condition: Stable         Niranjan Dorantes Jr., MD  05/24/18 0202

## 2018-05-23 NOTE — ED NOTES
No hematoma, bump or bruise noted to pts head. Pt rates pain on palpation to the top of her head 10/10

## 2018-05-24 NOTE — DISCHARGE INSTRUCTIONS
For URINARY TRACT INFECTION, I instructed patient to avoid holding in urine and recommended that she urinate when she feels the urge.  Also advised patient to drink plenty of liquids and reminded patient that she may need to drink more fluids than usual to help flush out the bacteria. Instructed patient to avoid alcohol, caffeine, and citrus juices as these substances can irritate your bladder and increase your symptoms.  Also recommended that patient apply heat to lower abdomen for 20 to 30 minutes every two hours to help decrease discomfort and pressure in the bladder.    Regarding HYPERTENSION, I advised patient to: keep a record of blood pressure results; take your blood pressure medication exactly as directed without skipping doses; avoid medications that contain heart stimulants, including over-the-counter drugs such as decongestants; maintain a healthy weight; cut back on sodium intake (i.e., limit canned, dried, packaged, and fast foods and don?t add salt to food); follow the DASH (Dietary Approaches to Stop Hypertension) eating plan which recommends vegetables, fruits, whole gains, and other heart healthy foods; begin an exercise program that includes  aerobic exercise 3 to 4 times a week for an average of 40 minutes at a time (with approval of cardiologist or primary care provider); limit drinks that contain alcohol and caffeine; control levels of emotional stress; and seek emergency care for any shortness of breath, chest pain, difficulty speaking, confusion, or visual changes.

## 2018-05-24 NOTE — ED NOTES
Patient provided with ice water at this time. Patient voicing that she is ready to be discharged home.

## 2018-05-28 ENCOUNTER — OFFICE VISIT (OUTPATIENT)
Dept: FAMILY MEDICINE | Facility: CLINIC | Age: 83
End: 2018-05-28
Payer: MEDICARE

## 2018-05-28 ENCOUNTER — LAB VISIT (OUTPATIENT)
Dept: LAB | Facility: HOSPITAL | Age: 83
End: 2018-05-28
Attending: FAMILY MEDICINE
Payer: MEDICARE

## 2018-05-28 ENCOUNTER — PATIENT OUTREACH (OUTPATIENT)
Dept: ADMINISTRATIVE | Facility: HOSPITAL | Age: 83
End: 2018-05-28

## 2018-05-28 VITALS
BODY MASS INDEX: 33.95 KG/M2 | HEART RATE: 75 BPM | SYSTOLIC BLOOD PRESSURE: 132 MMHG | WEIGHT: 184.5 LBS | DIASTOLIC BLOOD PRESSURE: 74 MMHG | HEIGHT: 62 IN | OXYGEN SATURATION: 97 % | TEMPERATURE: 97 F | RESPIRATION RATE: 20 BRPM

## 2018-05-28 DIAGNOSIS — I10 HYPERTENSION, UNSPECIFIED TYPE: ICD-10-CM

## 2018-05-28 DIAGNOSIS — M81.0 OSTEOPOROSIS, UNSPECIFIED OSTEOPOROSIS TYPE, UNSPECIFIED PATHOLOGICAL FRACTURE PRESENCE: ICD-10-CM

## 2018-05-28 DIAGNOSIS — H35.30 MACULAR DEGENERATION, UNSPECIFIED LATERALITY, UNSPECIFIED TYPE: ICD-10-CM

## 2018-05-28 DIAGNOSIS — M94.9 DISORDER OF CARTILAGE: ICD-10-CM
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
## 2018-05-28 DIAGNOSIS — F03.91 DEMENTIA WITH BEHAVIORAL DISTURBANCE, UNSPECIFIED DEMENTIA TYPE: ICD-10-CM
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
## 2018-05-28 PROBLEM — N39.0 URINARY TRACT INFECTION WITHOUT HEMATURIA: Status: RESOLVED | Noted: 2018-05-23 | Resolved: 2018-05-28

## 2018-05-28 LAB — 25(OH)D3+25(OH)D2 SERPL-MCNC: 32 NG/ML

## 2018-05-28 PROCEDURE — 99999 PR PBB SHADOW E&M-EST. PATIENT-LVL IV: CPT | Mod: PBBFAC,,, | Performed by: FAMILY MEDICINE

## 2018-05-28 PROCEDURE — 82306 VITAMIN D 25 HYDROXY: CPT

## 2018-05-28 PROCEDURE — 3075F SYST BP GE 130 - 139MM HG: CPT | Mod: CPTII,S$GLB,, | Performed by: FAMILY MEDICINE

## 2018-05-28 PROCEDURE — 99499 UNLISTED E&M SERVICE: CPT | Mod: HCNC,S$GLB,, | Performed by: FAMILY MEDICINE

## 2018-05-28 PROCEDURE — 36415 COLL VENOUS BLD VENIPUNCTURE: CPT | Mod: PO

## 2018-05-28 PROCEDURE — 99214 OFFICE O/P EST MOD 30 MIN: CPT | Mod: S$GLB,,, | Performed by: FAMILY MEDICINE

## 2018-05-28 PROCEDURE — 3078F DIAST BP <80 MM HG: CPT | Mod: CPTII,S$GLB,, | Performed by: FAMILY MEDICINE

## 2018-05-28 RX ORDER — AMLODIPINE BESYLATE 2.5 MG/1
2.5 TABLET ORAL 2 TIMES DAILY
Qty: 180 TABLET | Refills: 0 | Status: SHIPPED | OUTPATIENT
Start: 2018-05-28 | End: 2018-08-20 | Stop reason: SDUPTHER

## 2018-05-28 NOTE — PROGRESS NOTES
Subjective:       Patient ID: Delores M Cryer is a 82 y.o. female.    Chief Complaint: Hospital Follow Up    82 y old female with MD, dementia  , o porosis , a fib and htn here for f.u on ER visit due to fall and elevated BP . She hit her head after fall . She doesn't  recall what led to fall.  heard a loud noise . Home health nurse also noted that  BP were  high on 5/24 . Fall occur few days earlier. She was hospitalized at [facility] in April . Amlodipine and Losartan were d/c due to TIA . She did not f.u with Optometry nor  Rheum as recommended   for MD and O eder . Treated for UTI at ER and given clonidine and hydralazine while at ER . Doing well. No headaches , cp etc .   has been looking at Nursing homes . He has documentation and once he gets ready he will let us know .       Review of Systems   Constitutional: Negative.    HENT: Negative.    Eyes: Negative.    Respiratory: Negative.    Cardiovascular: Negative.    Gastrointestinal: Negative.    Genitourinary: Negative.    Musculoskeletal: Negative.    Skin: Negative.    Hematological: Negative.        Objective:      Physical Exam   Constitutional: She is oriented to person, place, and time. She appears well-developed and well-nourished. No distress.   HENT:   Head: Normocephalic and atraumatic.   Right Ear: External ear normal.   Left Ear: External ear normal.   Mouth/Throat: No oropharyngeal exudate.   Eyes: Conjunctivae and EOM are normal. Pupils are equal, round, and reactive to light. Right eye exhibits no discharge. Left eye exhibits no discharge. No scleral icterus.   Neck: Normal range of motion. Neck supple. No JVD present. No tracheal deviation present. No thyromegaly present.   Cardiovascular: Normal rate, regular rhythm and normal heart sounds.  Exam reveals no gallop and no friction rub.    No murmur heard.  Pulmonary/Chest: Effort normal and breath sounds normal. No stridor. No respiratory distress. She has no wheezes. She has no  rales. She exhibits no tenderness.   Abdominal: Soft. Bowel sounds are normal. She exhibits no distension. There is no tenderness. There is no rebound and no guarding.   Musculoskeletal: Normal range of motion.   Lymphadenopathy:     She has no cervical adenopathy.   Neurological: She is alert and oriented to person, place, and time.   Skin: Skin is warm and dry. She is not diaphoretic.   Psychiatric: She has a normal mood and affect. Her behavior is normal. Judgment and thought content normal.       Assessment:     Tania was seen today for hospital follow up.    Diagnoses and all orders for this visit:    Fall, initial encounter  -     Ambulatory consult to Optometry  -     Vitamin D; Future  -     Ambulatory consult to Physical Therapy    Macular degeneration, unspecified laterality, unspecified type  -     Ambulatory consult to Optometry    Disorder of cartilage   -     Vitamin D; Future    Osteoporosis, unspecified osteoporosis type, unspecified pathological fracture presence    Dementia with behavioral disturbance, unspecified dementia type    Hypertension, unspecified type    Other orders  -     amLODIPine (NORVASC) 2.5 MG tablet; Take 1 tablet (2.5 mg total) by mouth 2 (two) times daily.          Plan:     Tania was seen today for hospital follow up.    Diagnoses and all orders for this visit:    Fall, initial encounter  -     Ambulatory consult to Optometry  -     Vitamin D; Future  -     Ambulatory consult to Physical Therapy    Macular degeneration, unspecified laterality, unspecified type  -     Ambulatory consult to Optometry    Disorder of cartilage   -     Vitamin D; Future    Other orders  -     amLODIPine (NORVASC) 2.5 MG tablet; Take 1 tablet (2.5 mg total) by mouth 2 (two) times daily.     PT, OPT , Vit D  OPT  Will offer f.u juliann with [facility] on f.u   F.u with Neuro   Resume Amlodipine . F.u in 10 days

## 2018-05-29 DIAGNOSIS — E03.9 HYPOTHYROIDISM, UNSPECIFIED TYPE: Primary | ICD-10-CM

## 2018-05-29 RX ORDER — LEVOTHYROXINE SODIUM 125 UG/1
125 TABLET ORAL DAILY
Qty: 30 TABLET | Refills: 11 | Status: SHIPPED | OUTPATIENT
Start: 2018-05-29 | End: 2018-10-01

## 2018-05-29 NOTE — TELEPHONE ENCOUNTER
----- Message from Liv Trejo sent at 5/29/2018 10:34 AM CDT -----  Contact: January Sin would like a call back at [phone], Regards to patient needs a new prescription for her Rx Levothyroxine.    Thanks  Td

## 2018-05-30 ENCOUNTER — TELEPHONE (OUTPATIENT)
Dept: FAMILY MEDICINE | Facility: CLINIC | Age: 83
End: 2018-05-30

## 2018-06-04 ENCOUNTER — OFFICE VISIT (OUTPATIENT)
Dept: OPHTHALMOLOGY | Facility: CLINIC | Age: 83
End: 2018-06-04
Payer: MEDICARE

## 2018-06-04 DIAGNOSIS — H52.4 BILATERAL PRESBYOPIA: ICD-10-CM

## 2018-06-04 DIAGNOSIS — Z96.1 PSEUDOPHAKIA OF BOTH EYES: Primary | ICD-10-CM

## 2018-06-04 PROCEDURE — 92015 DETERMINE REFRACTIVE STATE: CPT | Mod: S$GLB,,, | Performed by: OPTOMETRIST

## 2018-06-04 PROCEDURE — 92004 COMPRE OPH EXAM NEW PT 1/>: CPT | Mod: S$GLB,,, | Performed by: OPTOMETRIST

## 2018-06-04 PROCEDURE — 99999 PR PBB SHADOW E&M-EST. PATIENT-LVL II: CPT | Mod: PBBFAC,,, | Performed by: OPTOMETRIST

## 2018-06-04 NOTE — PROGRESS NOTES
HPI     Patient had a fall x 1 week ago.  Referred by DR. Blount for eye exam.  Last eye exam 01/19/2012 [facility].  New patient to [facility].    Last edited by Holly Hall on 6/4/2018  3:20 PM. (History)            Assessment /Plan     For exam results, see Encounter Report.    Pseudophakia of both eyes    Bilateral presbyopia      Stable IOL OU.    Ocular health is normal.    Dispense Final Rx for glasses.  RTC 1 year  Discussed above and answered questions.

## 2018-06-04 NOTE — PATIENT INSTRUCTIONS
Pseudophakia of both eyes     Bilateral presbyopia        Stable IOL OU.     Ocular health is normal.     Dispense Final Rx for glasses.  RTC 1 year  Discussed above and answered questions.

## 2018-06-04 NOTE — LETTER
June 4, 2018      Viviana Melchor MD  [address] [facility] [address]           [facility]  [address] [place] [address]  Phone: [phone]  Fax: [phone]          Patient: Delores M Cryer   MR Number: [id number]   YOB: 1935   Date of Visit: 6/4/2018       Dear Dr. Viviana Melchor:    Thank you for referring Delores Cryer to me for evaluation. Attached you will find relevant portions of my assessment and plan of care.    If you have questions, please do not hesitate to call me. I look forward to following Delores Cryer along with you.    Sincerely,    Madi Lozoya, OD    Enclosure  CC:  No Recipients    If you would like to receive this communication electronically, please contact [email or web address] or [phone] to request more information on [employer] Link access.    For providers and/or their staff who would like to refer a patient to [facility], please contact us through our one-stop-shop provider referral line, Linda Sandhu, at [phone].    If you feel you have received this communication in error or would no longer like to receive these types of communications, please e-mail [email or web address]

## 2018-06-07 ENCOUNTER — OFFICE VISIT (OUTPATIENT)
Dept: FAMILY MEDICINE | Facility: CLINIC | Age: 83
End: 2018-06-07
Payer: MEDICARE

## 2018-06-07 VITALS
HEART RATE: 80 BPM | WEIGHT: 175.69 LBS | BODY MASS INDEX: 32.33 KG/M2 | SYSTOLIC BLOOD PRESSURE: 138 MMHG | OXYGEN SATURATION: 98 % | RESPIRATION RATE: 20 BRPM | TEMPERATURE: 97 F | HEIGHT: 62 IN | DIASTOLIC BLOOD PRESSURE: 74 MMHG

## 2018-06-07 DIAGNOSIS — I48.0 PAROXYSMAL ATRIAL FIBRILLATION: Chronic | ICD-10-CM

## 2018-06-07 DIAGNOSIS — I10 ESSENTIAL HYPERTENSION: Primary | ICD-10-CM

## 2018-06-07 DIAGNOSIS — Z12.11 COLON CANCER SCREENING: ICD-10-CM

## 2018-06-07 PROCEDURE — 3078F DIAST BP <80 MM HG: CPT | Mod: CPTII,S$GLB,, | Performed by: NURSE PRACTITIONER

## 2018-06-07 PROCEDURE — 99499 UNLISTED E&M SERVICE: CPT | Mod: HCNC,S$GLB,, | Performed by: NURSE PRACTITIONER

## 2018-06-07 PROCEDURE — 99213 OFFICE O/P EST LOW 20 MIN: CPT | Mod: S$GLB,,, | Performed by: NURSE PRACTITIONER

## 2018-06-07 PROCEDURE — 3075F SYST BP GE 130 - 139MM HG: CPT | Mod: CPTII,S$GLB,, | Performed by: NURSE PRACTITIONER

## 2018-06-07 PROCEDURE — 99999 PR PBB SHADOW E&M-EST. PATIENT-LVL IV: CPT | Mod: PBBFAC,,, | Performed by: NURSE PRACTITIONER

## 2018-06-07 NOTE — PROGRESS NOTES
"Subjective:       Patient ID: Delores M Cryer is a 82 y.o. female.    Chief Complaint: Follow-up (BP)  Pt reports to clinic for follow up evaluation of bp.  Pt was placed on norvasc 2.5mg bid.  Reports home bp readings  By home health nurse have "been good".  Denies any chest pain, headache or dizziness, no syncopal episodes.   Hypertension   This is a chronic problem. The current episode started more than 1 year ago. The problem has been waxing and waning since onset. The problem is controlled. Pertinent negatives include no blurred vision, chest pain or headaches. Risk factors for coronary artery disease include dyslipidemia, obesity and sedentary lifestyle.     Review of Systems   Constitutional: Negative for chills.   Eyes: Negative for blurred vision.   Cardiovascular: Negative for chest pain.   Gastrointestinal: Negative for constipation, nausea and vomiting.   Genitourinary: Positive for dysuria, frequency and urgency. Negative for flank pain and hematuria.   Skin: Positive for rash.   Neurological: Negative for headaches.       Objective:      Physical Exam   Constitutional: She is oriented to person, place, and time. She appears well-developed and well-nourished.   HENT:   Head: Normocephalic.   Eyes: EOM are normal.   Neck: Neck supple.   Cardiovascular: Normal rate.  An irregular rhythm present.   Pulmonary/Chest: Effort normal and breath sounds normal.   Neurological: She is alert and oriented to person, place, and time.   Vitals reviewed.      Assessment:       1. Essential hypertension    2. Paroxysmal atrial fibrillation    3. Colon cancer screening        Plan:   Essential hypertension  -stable. Continue current norvasc  Paroxysmal atrial fibrillation  Stable. Managed per cardiology  Colon cancer screening  -     Fecal Immunochemical Test (iFOBT); Future; Expected date: 06/07/2018      Follow-up in about 3 months (around 9/7/2018).    "

## 2018-06-11 RX ORDER — CITALOPRAM 20 MG/1
TABLET, FILM COATED ORAL
Qty: 90 TABLET | Refills: 0 | Status: SHIPPED | OUTPATIENT
Start: 2018-06-11 | End: 2018-09-18 | Stop reason: SDUPTHER

## 2018-06-18 RX ORDER — ATORVASTATIN CALCIUM 40 MG/1
40 TABLET, FILM COATED ORAL NIGHTLY
Qty: 90 TABLET | Refills: 0 | Status: SHIPPED | OUTPATIENT
Start: 2018-06-18 | End: 2018-09-18 | Stop reason: SDUPTHER

## 2018-06-18 NOTE — TELEPHONE ENCOUNTER
Medication NDC Prescription # ERNESTINE   ATORVASTATIN 40MG TABLETS [id number] No   Written Date Last Fill Date Quantity Days Supply   6/17/2018 5/20/2018 30 Tab 30   Sig Notes   TAKE 1 TABLET BY MOUTH EVERY NIGHT AT BEDTIME Not Available

## 2018-06-25 RX ORDER — APIXABAN 5 MG/1
TABLET, FILM COATED ORAL
Qty: 60 TABLET | Refills: 0 | Status: SHIPPED | OUTPATIENT
Start: 2018-06-25 | End: 2018-06-28 | Stop reason: SDUPTHER

## 2018-06-26 ENCOUNTER — OFFICE VISIT (OUTPATIENT)
Dept: FAMILY MEDICINE | Facility: CLINIC | Age: 83
End: 2018-06-26
Payer: MEDICARE

## 2018-06-26 VITALS
BODY MASS INDEX: 33.51 KG/M2 | DIASTOLIC BLOOD PRESSURE: 60 MMHG | HEART RATE: 87 BPM | SYSTOLIC BLOOD PRESSURE: 132 MMHG | TEMPERATURE: 98 F | OXYGEN SATURATION: 98 % | HEIGHT: 62 IN | WEIGHT: 182.13 LBS

## 2018-06-26 DIAGNOSIS — G30.9 ALZHEIMER'S DEMENTIA WITHOUT BEHAVIORAL DISTURBANCE, UNSPECIFIED TIMING OF DEMENTIA ONSET: ICD-10-CM

## 2018-06-26 DIAGNOSIS — I10 HYPERTENSION, UNSPECIFIED TYPE: ICD-10-CM

## 2018-06-26 DIAGNOSIS — R79.9 ABNORMAL FINDING OF BLOOD CHEMISTRY: ICD-10-CM

## 2018-06-26 DIAGNOSIS — E78.5 DYSLIPIDEMIA: ICD-10-CM

## 2018-06-26 DIAGNOSIS — I63.9 CEREBROVASCULAR ACCIDENT (CVA), UNSPECIFIED MECHANISM: ICD-10-CM

## 2018-06-26 DIAGNOSIS — F02.80 ALZHEIMER'S DEMENTIA WITHOUT BEHAVIORAL DISTURBANCE, UNSPECIFIED TIMING OF DEMENTIA ONSET: ICD-10-CM

## 2018-06-26 DIAGNOSIS — I48.20 CHRONIC ATRIAL FIBRILLATION: ICD-10-CM

## 2018-06-26 DIAGNOSIS — E03.9 HYPOTHYROIDISM, UNSPECIFIED TYPE: Primary | ICD-10-CM

## 2018-06-26 PROCEDURE — 99499 UNLISTED E&M SERVICE: CPT | Mod: HCNC,S$GLB,, | Performed by: FAMILY MEDICINE

## 2018-06-26 PROCEDURE — 99214 OFFICE O/P EST MOD 30 MIN: CPT | Mod: S$GLB,,, | Performed by: FAMILY MEDICINE

## 2018-06-26 PROCEDURE — 99999 PR PBB SHADOW E&M-EST. PATIENT-LVL III: CPT | Mod: PBBFAC,,, | Performed by: FAMILY MEDICINE

## 2018-06-26 PROCEDURE — 3078F DIAST BP <80 MM HG: CPT | Mod: CPTII,S$GLB,, | Performed by: FAMILY MEDICINE

## 2018-06-26 PROCEDURE — 3075F SYST BP GE 130 - 139MM HG: CPT | Mod: CPTII,S$GLB,, | Performed by: FAMILY MEDICINE

## 2018-06-26 NOTE — PROGRESS NOTES
Subjective:       Patient ID: Delores M Cryer is a 82 y.o. female.    Chief Complaint: Medication Refill    82 y old female with CAD, HTN , Dlp, hypothyroidism , CVA, dementia and a fib here for f.u . Doing well. Compliant with meds.  is wondering if she should remain on ELiquis. Cant remember conversations . Doesn't cook nor drive  Any longer, manages finances  . independent with hygiene . Remote Small old right basal ganglia lacunar infarct, denies any neurological sequales       Medication Refill       Review of Systems   Constitutional: Negative.    HENT: Negative.    Eyes: Negative.    Respiratory: Negative.    Cardiovascular: Negative.    Gastrointestinal: Negative.    Genitourinary: Negative.    Musculoskeletal: Negative.    Skin: Negative.    Neurological: Negative.    Hematological: Negative.        Objective:      Physical Exam   Constitutional: She is oriented to person, place, and time. She appears well-developed and well-nourished. No distress.   HENT:   Head: Normocephalic and atraumatic.   Right Ear: External ear normal.   Left Ear: External ear normal.   Mouth/Throat: No oropharyngeal exudate.   Eyes: Conjunctivae and EOM are normal. Pupils are equal, round, and reactive to light. Right eye exhibits no discharge. Left eye exhibits no discharge. No scleral icterus.   Neck: Normal range of motion. Neck supple. No JVD present. No tracheal deviation present. No thyromegaly present.   Cardiovascular: Normal rate, regular rhythm and normal heart sounds.  Exam reveals no gallop and no friction rub.    No murmur heard.  Pulmonary/Chest: Effort normal and breath sounds normal. No stridor. No respiratory distress. She has no wheezes. She has no rales. She exhibits no tenderness.   Abdominal: Soft. Bowel sounds are normal. She exhibits no distension. There is no tenderness. There is no rebound and no guarding.   Musculoskeletal: Normal range of motion.   Lymphadenopathy:     She has no cervical  adenopathy.   Neurological: She is alert and oriented to person, place, and time.   Skin: Skin is warm and dry. She is not diaphoretic.   Psychiatric: She has a normal mood and affect. Her behavior is normal. Judgment and thought content normal.       Assessment:       Tania was seen today for medication refill.    Diagnoses and all orders for this visit:    Hypothyroidism, unspecified type  -     CBC auto differential; Future  -     Comprehensive metabolic panel; Future  -     Hemoglobin A1c; Future  -     Lipid panel; Future  -     TSH; Future    Dyslipidemia  -     CBC auto differential; Future  -     Comprehensive metabolic panel; Future  -     Hemoglobin A1c; Future  -     Lipid panel; Future  -     TSH; Future    Hypertension, unspecified type  -     CBC auto differential; Future  -     Comprehensive metabolic panel; Future  -     Hemoglobin A1c; Future  -     Lipid panel; Future  -     TSH; Future    Abnormal finding of blood chemistry   -     Hemoglobin A1c; Future    Chronic atrial fibrillation    Alzheimer's dementia without behavioral disturbance, unspecified timing of dementia onset    Cerebrovascular accident (CVA), unspecified mechanism      Plan:     Tania was seen today for medication refill.    Diagnoses and all orders for this visit:    Hypothyroidism, unspecified type  -     CBC auto differential; Future  -     Comprehensive metabolic panel; Future  -     Hemoglobin A1c; Future  -     Lipid panel; Future  -     TSH; Future    Dyslipidemia  -     CBC auto differential; Future  -     Comprehensive metabolic panel; Future  -     Hemoglobin A1c; Future  -     Lipid panel; Future  -     TSH; Future    Hypertension, unspecified type  -     CBC auto differential; Future  -     Comprehensive metabolic panel; Future  -     Hemoglobin A1c; Future  -     Lipid panel; Future  -     TSH; Future    Abnormal finding of blood chemistry   -     Hemoglobin A1c; Future     labs   Cont statin + aspirin for  now  Will discuss with  Card and neuro the   need  To remain on  ELIQUIS   Stable . Willi monitor

## 2018-06-28 ENCOUNTER — OFFICE VISIT (OUTPATIENT)
Dept: CARDIOLOGY | Facility: CLINIC | Age: 83
End: 2018-06-28
Payer: MEDICARE

## 2018-06-28 VITALS
WEIGHT: 183.88 LBS | SYSTOLIC BLOOD PRESSURE: 126 MMHG | BODY MASS INDEX: 33.84 KG/M2 | HEIGHT: 62 IN | DIASTOLIC BLOOD PRESSURE: 70 MMHG | HEART RATE: 64 BPM

## 2018-06-28 DIAGNOSIS — I10 HYPERTENSION, UNSPECIFIED TYPE: Chronic | ICD-10-CM

## 2018-06-28 DIAGNOSIS — R09.89 CAROTID BRUIT, UNSPECIFIED LATERALITY: ICD-10-CM

## 2018-06-28 DIAGNOSIS — E78.5 HYPERLIPIDEMIA, UNSPECIFIED HYPERLIPIDEMIA TYPE: Chronic | ICD-10-CM

## 2018-06-28 DIAGNOSIS — I25.2 OLD MYOCARDIAL INFARCTION: ICD-10-CM

## 2018-06-28 DIAGNOSIS — I25.84 CORONARY ARTERY DISEASE DUE TO CALCIFIED CORONARY LESION: ICD-10-CM

## 2018-06-28 DIAGNOSIS — I25.10 CORONARY ARTERY DISEASE DUE TO CALCIFIED CORONARY LESION: ICD-10-CM

## 2018-06-28 DIAGNOSIS — I48.20 CHRONIC ATRIAL FIBRILLATION: Primary | Chronic | ICD-10-CM

## 2018-06-28 DIAGNOSIS — I51.89 DIASTOLIC DYSFUNCTION: ICD-10-CM

## 2018-06-28 DIAGNOSIS — I21.4 NSTEMI (NON-ST ELEVATED MYOCARDIAL INFARCTION): ICD-10-CM

## 2018-06-28 PROCEDURE — 99214 OFFICE O/P EST MOD 30 MIN: CPT | Mod: S$GLB,,, | Performed by: INTERNAL MEDICINE

## 2018-06-28 PROCEDURE — 99499 UNLISTED E&M SERVICE: CPT | Mod: S$GLB,,, | Performed by: INTERNAL MEDICINE

## 2018-06-28 PROCEDURE — 99999 PR PBB SHADOW E&M-EST. PATIENT-LVL III: CPT | Mod: PBBFAC,,, | Performed by: INTERNAL MEDICINE

## 2018-06-28 PROCEDURE — 3074F SYST BP LT 130 MM HG: CPT | Mod: CPTII,S$GLB,, | Performed by: INTERNAL MEDICINE

## 2018-06-28 PROCEDURE — 3078F DIAST BP <80 MM HG: CPT | Mod: CPTII,S$GLB,, | Performed by: INTERNAL MEDICINE

## 2018-06-28 NOTE — PATIENT INSTRUCTIONS
Living with Atrial Fibrillation: Preventing Stroke  Atrial fibrillation (AFib) is the most common abnormal heart rhythm in the world. The heart has 2 upper chambers called atria and 2 lower chambers called ventricles. AFib causes the atria to quiver (fibrillate) instead of pumping normally. Blood can then pool in the heart instead of moving in and out as usual. This can cause blood clots to form inside the heart. A clot can break free, travel to the brain and cause a stroke. A stroke can cause brain damage very quickly.    Taking medicine to prevent stroke  Your healthcare provider may prescribe a medicine to help prevent blood clots. This type of medicine is called a blood thinner. Blood thinners include:  · Antiplatelet medicines, such as aspirin or clopidrogrel  · Anticoagulation medicines, such as warfarin, dabigatran, rivaroxaban, apixaban, or edoxaban  Risks of blood thinner medicine  Blood thinners increase your risk of bleeding. If you take certain blood thinners, you may need to take extra steps to stay healthy. You may need regular blood tests to check the levels of medicine in your blood. Youll need to be careful not to injure yourself. And you may need to watch your diet for foods that affect blood clotting.  If your blood is too thin, you may have symptoms of excess bleeding, such as:  · Unusual bruising  · Bleeding from the gums  · Blood in the urine or stool  · Black stools  · Vomiting blood  · Nosebleeds  · An unusual or severe headache  Taking the right dose  Youll need to make sure to take the medicine exactly as directed by your healthcare provider. Take it at the same time each day. If you miss a dose, call your provider right away to find out how much to take. Never take a double dose. If you take too much, it can cause too much bleeding. It can cause bleeding you can see, on the outside of your body. And it can cause bleeding on the inside of your body that you may not be aware of.  Getting  your blood tested  Depending on which blood thinner you take, you may need to have your blood tested on a regular schedule. This is to make sure you dont have too much or too little of the medicine in your blood. Too much can cause excess bleeding. Too little may not prevent blood clots from harming you.  You may need to visit a hospital or clinic every week to have your blood tested. Or a nurse may come to your home and test your blood. In some cases, you may be able to test your blood at home with a small machine. Talk with your healthcare provider to find out whats best for you. After the blood test, your healthcare provider may tell you to change your dose of medicine.  Watching your diet  Some foods can affect how certain blood thinners work. In particular, warfarin levels are sensitive to your diet. For example, many foods contain vitamin K. Vitamin K is a substance that helps your blood clot. You dont need to avoid foods that have vitamin K. But you do need to keep the amount of them you eat steady as possible from day to day. Examples of foods high in vitamin K are asparagus, avocado, broccoli, cabbage, kale, spinach, and some other leafy green vegetables. Oils, such as soybean, canola, and olive, are also high in vitamin K.  Other foods and drinks can affect the way blood thinners work in your body. These include:  · Grapefruit and grapefruit juice  · Cranberries and cranberry juice  · Fish oil supplements  · Garlic, broderick, licorice, and turmeric  · Herbs used in herbal teas or supplements  · Alcohol  If any of these items are part of your regular diet, continue using them as you normally would. Dont make any major changes in your diet without first talking with your healthcare provider.  You may also need to limit fats in your diet to 2 to 4 tablespoons a day.  Preventing injury  Because blood thinners make you bleed more, youll need to protect yourself from breaks in the skin. Follow these  guidelines:  · Don't go barefoot - always wear shoes.  · Don't trim corns or calluses yourself.  · Consider using an electric razor instead of a manual one.  · Use a soft-bristled toothbrush and waxed dental floss.  Youll also need to avoid any activities that may cause injury. If you fall or are injured, you could be bleeding inside your body and not know it. Make sure to get medical attention right away if you fall, hit your head, or have any other kind of injury.  Other safety tips  While on your medicine, be sure to:  · Tell all of your healthcare providers that you take a blood thinner for AFib. This includes all of your doctors, dental care providers, and your pharmacist.  · Ask your doctor before taking any new medicines, vitamins, or other supplements. Any of these can cause problems when you take a blood thinner.  · Wear a medical alert bracelet or carry an ID card in your wallet if you will be taking blood thinners for months or longer.  · Keep all appointments for your blood tests.  Procedures to prevent stroke  Most blood clots that form in the heart occur in a pouch of the left atrium called the appendage. This pouch can often be large and have multiple lobes which can permit blood pooling and clot formation. Left atrial appendage closure is a nonsurgical procedure in which a self-expanding plug is placed at the opening of the left atrial appendage to close off the appendage from the rest of the heart. Once the plug has fully sealed, no blood can enter or leave the appendage. This reduces blood clot formation and stroke risk. Ask your doctor if you qualify for this type of procedure.  Other ways to help prevent stroke  Your healthcare provider might give you other advice about how to lower your risk for stroke, such as:  · Lowering your cholesterol with lifestyle changes or medicine  · Not smoking  · Getting physical activity  · Losing weight if needed  · Eating a heart-healthy diet  · Not drinking too  much alcohol     When to call your healthcare provider  Call your healthcare provider right away if you have any of these:  · Unusual or severe headache  · Confusion, weakness, or numbness  · Loss of vision  · Difficulty with speech  · Bleeding that wont stop  · Coughing or vomiting blood  · Bright red blood in the stool  · Fall or injury to the head  · Symptoms of atrial fibrillation that are new or getting worse   Date Last Reviewed: 5/1/2016  © 9280-8149 [employer]. [address] [place] [address], [place] [address]. All rights reserved. This information is not intended as a substitute for professional medical care. Always follow your healthcare professional's instructions.

## 2018-06-28 NOTE — PROGRESS NOTES
Subjective:   Patient ID:  Delores M Cryer is a 82 y.o. female who presents for cardiac consult of Hypertension      HPI  The patient came in today for cardiac consult of Hypertension    6/28/18  This is an 82 year old female pt with PMHx CAD, Chronic AF, HTN presents for AFib anticoagulation management.     She was started on Eliquis 1 year ago when she was admitted to [facility]. She has seen Dr. Martino in the past about 2 years ago at which point she was only on ASA but then started on Eliquis 1 year ago by Dr. Mitchell.   Her  is present and the room and described she has mild dementia. She has no recent falls or bleeding. Does have occasional bruising.     Patient feels well, no specific complaints, no chest pain, no sob, no leg swelling, no PND, no palpitation, no dizziness, no syncope, no CNS symptoms.    Patient is compliant with medications.    2D ECHO  CONCLUSIONS     1 - Normal left ventricular systolic function (EF 60-65%).     2 - Normal left ventricular diastolic function.     3 - Normal right ventricular systolic function .     4 - Concentric hypertrophy.     This document has been electronically    SIGNED BY: Richard Mitchell MD On: 07/12/2017 13:40  Past Medical History:   Diagnosis Date    Atrial fibrillation     Coronary artery disease     Elevated cholesterol     Hypertension     Macular degeneration     Thyroid condition        Past Surgical History:   Procedure Laterality Date    APPENDECTOMY      BACK SURGERY      x2    CARDIAC CATHETERIZATION  2016    CATARACT EXTRACTION      CHOLECYSTECTOMY      CORONARY ANGIOPLASTY  09/2015    non-obstructive CAD    EYE SURGERY      HIP SURGERY Bilateral     HYSTERECTOMY      KNEE SURGERY Bilateral     x 2 each       Social History   Substance Use Topics    Smoking status: Former Smoker     Packs/day: 1.00     Years: 30.00     Types: Cigarettes     Quit date: 1/1/1983    Smokeless tobacco: Never Used    Alcohol use No       Family History    Problem Relation Age of Onset    Cataracts Mother     Glaucoma Mother     Heart disease Mother     Diabetes Sister     Diabetes Maternal Grandmother     Hypertension Father        Patient's Medications   New Prescriptions    No medications on file   Previous Medications    AMLODIPINE (NORVASC) 2.5 MG TABLET    Take 1 tablet (2.5 mg total) by mouth 2 (two) times daily.    ATORVASTATIN (LIPITOR) 40 MG TABLET    Take 1 tablet (40 mg total) by mouth every evening.    CALCIUM CARBONATE-VIT D3-MIN (CALTRATE 600+D PLUS MINERALS) 600 MG (1,500 MG)-400 UNIT CHEW    Take 1 tablet by mouth Twice daily.    CITALOPRAM (CELEXA) 20 MG TABLET    TAKE 1 TABLET BY MOUTH EVERY DAY    ISOSORBIDE MONONITRATE (IMDUR) 30 MG 24 HR TABLET    Take 1 tablet (30 mg total) by mouth once daily.    LEVOTHYROXINE (SYNTHROID) 125 MCG TABLET    Take 1 tablet (125 mcg total) by mouth once daily.    METOPROLOL SUCCINATE (TOPROL-XL) 100 MG 24 HR TABLET    take 1 tablet by mouth twice a day    MULTIVITAMIN W-MINERALS/LUTEIN (CENTRUM SILVER ORAL)    Take 1 tablet by mouth.    OMEPRAZOLE (PRILOSEC) 40 MG CAPSULE    TAKE 1 CAPSULE BY MOUTH EVERY DAY   Modified Medications    Modified Medication Previous Medication    APIXABAN (ELIQUIS) 5 MG TAB ELIQUIS 5 mg Tab       Take 1 tablet (5 mg total) by mouth 2 (two) times daily.    TAKE 1 TABLET BY MOUTH TWICE DAILY   Discontinued Medications    METOPROLOL SUCCINATE (TOPROL-XL) 100 MG 24 HR TABLET    take 1 tablet by mouth twice a day       Review of Systems   Constitutional: Negative.    HENT: Negative.    Eyes: Negative.    Respiratory: Negative.    Cardiovascular: Negative.    Gastrointestinal: Negative.    Genitourinary: Negative.    Musculoskeletal: Negative.    Skin: Negative.    Neurological: Negative.    Endo/Heme/Allergies: Negative.    Psychiatric/Behavioral: Negative.    All 12 systems otherwise negative.      Wt Readings from Last 3 Encounters:   06/28/18 83.4 kg (183 lb 13.8 oz)   06/26/18  "82.6 kg (182 lb 1.6 oz)   06/07/18 79.7 kg (175 lb 11.3 oz)     Temp Readings from Last 3 Encounters:   06/26/18 97.7 °F (36.5 °C) (Tympanic)   06/07/18 97.4 °F (36.3 °C) (Tympanic)   05/28/18 97.2 °F (36.2 °C) (Tympanic)     BP Readings from Last 3 Encounters:   06/28/18 126/70   06/26/18 132/60   06/07/18 138/74     Pulse Readings from Last 3 Encounters:   06/28/18 64   06/26/18 87   06/07/18 80       /70 (BP Location: Right arm)   Pulse 64   Ht 5' 2" (1.575 m)   Wt 83.4 kg (183 lb 13.8 oz)   BMI 33.63 kg/m²     Objective:   Physical Exam   Constitutional: She is oriented to person, place, and time. She appears well-developed and well-nourished. No distress.   HENT:   Head: Normocephalic and atraumatic.   Nose: Nose normal.   Mouth/Throat: Oropharynx is clear and moist.   Eyes: Conjunctivae and EOM are normal. No scleral icterus.   Neck: Normal range of motion. Neck supple. No JVD present. No thyromegaly present.   Cardiovascular: Normal rate, S1 normal and S2 normal.  An irregularly irregular rhythm present. Exam reveals no gallop, no S3, no S4 and no friction rub.    No murmur heard.  Pulmonary/Chest: Effort normal and breath sounds normal. No stridor. No respiratory distress. She has no wheezes. She has no rales. She exhibits no tenderness.   Abdominal: Soft. Bowel sounds are normal. She exhibits no distension and no mass. There is no tenderness. There is no rebound.   Genitourinary:   Genitourinary Comments: Deferred   Musculoskeletal: Normal range of motion. She exhibits no edema, tenderness or deformity.   Lymphadenopathy:     She has no cervical adenopathy.   Neurological: She is alert and oriented to person, place, and time. She exhibits normal muscle tone. Coordination normal.   Skin: Skin is warm and dry. No rash noted. She is not diaphoretic. No erythema. No pallor.   Psychiatric: She has a normal mood and affect. Her behavior is normal. Judgment and thought content normal.   Nursing note and " vitals reviewed.      Lab Results   Component Value Date     05/23/2018    K 3.9 05/23/2018     05/23/2018    CO2 26 05/23/2018    BUN 10 05/23/2018    CREATININE 0.7 05/23/2018    GLU 95 05/23/2018    HGBA1C 5.4 04/24/2017    MG 2.3 02/10/2004    AST 62 (H) 05/23/2018    ALT 58 (H) 05/23/2018    ALBUMIN 3.2 (L) 05/23/2018    PROT 6.3 05/23/2018    BILITOT 1.4 (H) 05/23/2018    WBC 7.70 05/23/2018    HGB 14.2 05/23/2018    HCT 43.2 05/23/2018    MCV 90 05/23/2018     05/23/2018    INR 1.0 09/20/2015    INR 1.9 (H) 05/18/2009    TSH 2.390 04/24/2017    CHOL 151 04/24/2017    HDL 43 04/24/2017    LDLCALC 88.8 04/24/2017    TRIG 96 04/24/2017     (H) 05/23/2018     Assessment:      1. Chronic atrial fibrillation    2. Hyperlipidemia, unspecified hyperlipidemia type    3. Hypertension, unspecified type    4. Old myocardial infarction    5. NSTEMI (non-ST elevated myocardial infarction)    6. Coronary artery disease due to calcified coronary lesion    7. Diastolic dysfunction    8. Carotid bruit, unspecified laterality        Plan:   1. Chronic AF  - rate controlled  - continue Eliquis 5 BID, discussed risk vs benefit  - h/o CVA needs to continue a/c due to high CHADSVASC    2. CAD  - no angina  - cont meds    3. HTN  - cont meds    4. HFpEF  - pt euvolemic    5. HLD  - cont statin    6. Carotid bruit/pior CVA  - check carotid u/s    Thank you for allowing me to participate in this patient's care. Please do not hesitate to contact me with any questions or concerns. Consult note has been forwarded to the referral physician.

## 2018-07-03 LAB — INTERNAL CAROTID STENOSIS: NORMAL

## 2018-07-09 RX ORDER — METOPROLOL SUCCINATE 100 MG/1
TABLET, EXTENDED RELEASE ORAL
Qty: 180 TABLET | Refills: 0 | Status: SHIPPED | OUTPATIENT
Start: 2018-07-09 | End: 2018-10-29 | Stop reason: SDUPTHER

## 2018-07-25 ENCOUNTER — APPOINTMENT (OUTPATIENT)
Dept: LAB | Facility: HOSPITAL | Age: 83
End: 2018-07-25
Attending: NURSE PRACTITIONER
Payer: MEDICARE

## 2018-08-06 ENCOUNTER — OFFICE VISIT (OUTPATIENT)
Dept: FAMILY MEDICINE | Facility: CLINIC | Age: 83
End: 2018-08-06
Payer: MEDICARE

## 2018-08-06 VITALS
HEIGHT: 62 IN | OXYGEN SATURATION: 97 % | WEIGHT: 186.75 LBS | TEMPERATURE: 98 F | HEART RATE: 100 BPM | BODY MASS INDEX: 34.37 KG/M2 | SYSTOLIC BLOOD PRESSURE: 126 MMHG | DIASTOLIC BLOOD PRESSURE: 68 MMHG

## 2018-08-06 DIAGNOSIS — B36.9 DERMATITIS FUNGAL: Primary | ICD-10-CM

## 2018-08-06 PROCEDURE — 3074F SYST BP LT 130 MM HG: CPT | Mod: CPTII,S$GLB,, | Performed by: FAMILY MEDICINE

## 2018-08-06 PROCEDURE — 99213 OFFICE O/P EST LOW 20 MIN: CPT | Mod: S$GLB,,, | Performed by: FAMILY MEDICINE

## 2018-08-06 PROCEDURE — 3078F DIAST BP <80 MM HG: CPT | Mod: CPTII,S$GLB,, | Performed by: FAMILY MEDICINE

## 2018-08-06 PROCEDURE — 99999 PR PBB SHADOW E&M-EST. PATIENT-LVL III: CPT | Mod: PBBFAC,,, | Performed by: FAMILY MEDICINE

## 2018-08-06 RX ORDER — FLUCONAZOLE 150 MG/1
TABLET ORAL
Qty: 3 TABLET | Refills: 0 | Status: SHIPPED | OUTPATIENT
Start: 2018-08-06 | End: 2018-09-18 | Stop reason: ALTCHOICE

## 2018-08-06 RX ORDER — TRIAMCINOLONE ACETONIDE 1 MG/G
CREAM TOPICAL 2 TIMES DAILY
Qty: 80 G | Refills: 0 | Status: SHIPPED | OUTPATIENT
Start: 2018-08-06 | End: 2018-10-10

## 2018-08-06 NOTE — PROGRESS NOTES
Subjective:       Patient ID: Delores M Cryer is a 82 y.o. female.    Chief Complaint: Rash    82 y old female whose h/h nurse noted a rash in perianal area few days back . She has been applying OTC hydrocortisone with no relief .She has  not changed  cosmetic products, no sick contacts .  has purchased new diapesr today .       Rash       Review of Systems   Constitutional: Negative.    HENT: Negative.    Eyes: Negative.    Respiratory: Negative.    Cardiovascular: Negative.    Gastrointestinal: Negative.    Genitourinary: Negative.    Musculoskeletal: Negative.    Skin: Positive for rash.   Hematological: Negative.        Objective:      Physical Exam   Constitutional: She is oriented to person, place, and time. She appears well-developed and well-nourished. No distress.   HENT:   Head: Normocephalic and atraumatic.   Right Ear: External ear normal.   Left Ear: External ear normal.   Mouth/Throat: No oropharyngeal exudate.   Eyes: Conjunctivae and EOM are normal. Pupils are equal, round, and reactive to light. Right eye exhibits no discharge. Left eye exhibits no discharge. No scleral icterus.   Neck: Normal range of motion. Neck supple. No JVD present. No tracheal deviation present. No thyromegaly present.   Cardiovascular: Normal rate, regular rhythm and normal heart sounds.  Exam reveals no gallop and no friction rub.    No murmur heard.  Pulmonary/Chest: Effort normal and breath sounds normal. No stridor. No respiratory distress. She has no wheezes. She has no rales. She exhibits no tenderness.   Abdominal: Soft. Bowel sounds are normal. She exhibits no distension. There is no tenderness. There is no rebound and no guarding.   Genitourinary:       There is no rash on the left labia.   Musculoskeletal: Normal range of motion.   Lymphadenopathy:     She has no cervical adenopathy.   Neurological: She is alert and oriented to person, place, and time.   Skin: Skin is warm and dry. She is not diaphoretic.  There is erythema.   Psychiatric: She has a normal mood and affect. Her behavior is normal. Judgment and thought content normal.       Assessment:       Tania was seen today for rash.    Diagnoses and all orders for this visit:    Dermatitis fungal    Other orders  -     fluconazole (DIFLUCAN) 150 MG Tab; 1 tab once weekly for 3 dosis  -     triamcinolone acetonide 0.1% (KENALOG) 0.1 % cream; Apply topically 2 (two) times daily. for 10 days      Plan:   Call or return to clinic prn if these symptoms worsen or fail to improve as anticipated.

## 2018-08-20 RX ORDER — AMLODIPINE BESYLATE 2.5 MG/1
TABLET ORAL
Qty: 180 TABLET | Refills: 0 | Status: SHIPPED | OUTPATIENT
Start: 2018-08-20 | End: 2018-09-26

## 2018-08-29 ENCOUNTER — TELEPHONE (OUTPATIENT)
Dept: ADMINISTRATIVE | Facility: CLINIC | Age: 83
End: 2018-08-29

## 2018-08-29 ENCOUNTER — PATIENT MESSAGE (OUTPATIENT)
Dept: FAMILY MEDICINE | Facility: CLINIC | Age: 83
End: 2018-08-29

## 2018-08-30 ENCOUNTER — PATIENT MESSAGE (OUTPATIENT)
Dept: NEUROLOGY | Facility: CLINIC | Age: 83
End: 2018-08-30

## 2018-09-04 ENCOUNTER — TELEPHONE (OUTPATIENT)
Dept: FAMILY MEDICINE | Facility: CLINIC | Age: 83
End: 2018-09-04

## 2018-09-04 DIAGNOSIS — F03.91 DEMENTIA WITH BEHAVIORAL DISTURBANCE, UNSPECIFIED DEMENTIA TYPE: Primary | ICD-10-CM

## 2018-09-18 RX ORDER — CITALOPRAM 20 MG/1
20 TABLET, FILM COATED ORAL DAILY
Qty: 90 TABLET | Refills: 0 | Status: SHIPPED | OUTPATIENT
Start: 2018-09-18 | End: 2018-12-10 | Stop reason: SDUPTHER

## 2018-09-18 RX ORDER — ATORVASTATIN CALCIUM 40 MG/1
40 TABLET, FILM COATED ORAL NIGHTLY
Qty: 90 TABLET | Refills: 0 | Status: SHIPPED | OUTPATIENT
Start: 2018-09-18 | End: 2018-12-10 | Stop reason: SDUPTHER

## 2018-09-24 ENCOUNTER — TELEPHONE (OUTPATIENT)
Dept: FAMILY MEDICINE | Facility: CLINIC | Age: 83
End: 2018-09-24

## 2018-09-24 NOTE — TELEPHONE ENCOUNTER
Pts  came into the office. States his wife Delores Cryer had a fall today at 3:00 a.m. and wanted to speak with Dr. Blount to see if she needs to come in to get checked up on. Pts  states the pt is on blood thinners and has dementia. Told  Dr. Blount was not in the office today and will not be back til Wednesday. Offered to schedule pt an appt with our other providers today, urgent care, or ER to get pt checked out. Pts  states the pt did not hurt anything, is not in any pain, has no bruises or abrasions noted anywhere.  states he would like to wait on an appt for Dr. Blount on Wednesday.  states he will bring pt to the ER to get checked out if any changes are noticed. Appt scheduled with Dr. Blount on Wednesday 9/26/18 at 10:20 a.m.

## 2018-09-24 NOTE — TELEPHONE ENCOUNTER
When I spoke with  in the office I offered an appt today with another provider, urgent care, or ER.

## 2018-09-26 ENCOUNTER — OFFICE VISIT (OUTPATIENT)
Dept: FAMILY MEDICINE | Facility: CLINIC | Age: 83
End: 2018-09-26
Payer: MEDICARE

## 2018-09-26 ENCOUNTER — HOSPITAL ENCOUNTER (OUTPATIENT)
Dept: RADIOLOGY | Facility: HOSPITAL | Age: 83
Discharge: HOME | End: 2018-09-26
Attending: FAMILY MEDICINE
Payer: MEDICARE

## 2018-09-26 ENCOUNTER — PATIENT MESSAGE (OUTPATIENT)
Dept: FAMILY MEDICINE | Facility: CLINIC | Age: 83
End: 2018-09-26

## 2018-09-26 VITALS
BODY MASS INDEX: 33.47 KG/M2 | HEART RATE: 88 BPM | HEIGHT: 62 IN | TEMPERATURE: 97 F | OXYGEN SATURATION: 97 % | WEIGHT: 181.88 LBS | SYSTOLIC BLOOD PRESSURE: 142 MMHG | DIASTOLIC BLOOD PRESSURE: 60 MMHG

## 2018-09-26 DIAGNOSIS — R29.6 FREQUENT FALLS: Primary | ICD-10-CM

## 2018-09-26 DIAGNOSIS — R05.3 CHRONIC COUGH: ICD-10-CM

## 2018-09-26 PROCEDURE — 99214 OFFICE O/P EST MOD 30 MIN: CPT | Mod: PBBFAC,25,PO | Performed by: FAMILY MEDICINE

## 2018-09-26 PROCEDURE — 71046 X-RAY EXAM CHEST 2 VIEWS: CPT | Mod: 26,,, | Performed by: RADIOLOGY

## 2018-09-26 PROCEDURE — 3078F DIAST BP <80 MM HG: CPT | Mod: CPTII,,, | Performed by: FAMILY MEDICINE

## 2018-09-26 PROCEDURE — 99999 PR PBB SHADOW E&M-EST. PATIENT-LVL IV: CPT | Mod: PBBFAC,,, | Performed by: FAMILY MEDICINE

## 2018-09-26 PROCEDURE — 99214 OFFICE O/P EST MOD 30 MIN: CPT | Mod: 25,S$PBB,, | Performed by: FAMILY MEDICINE

## 2018-09-26 PROCEDURE — 90662 IIV NO PRSV INCREASED AG IM: CPT | Mod: PBBFAC,PO

## 2018-09-26 PROCEDURE — 3077F SYST BP >= 140 MM HG: CPT | Mod: CPTII,,, | Performed by: FAMILY MEDICINE

## 2018-09-26 PROCEDURE — 1101F PT FALLS ASSESS-DOCD LE1/YR: CPT | Mod: CPTII,,, | Performed by: FAMILY MEDICINE

## 2018-09-26 PROCEDURE — 71046 X-RAY EXAM CHEST 2 VIEWS: CPT | Mod: TC,PO

## 2018-09-26 RX ORDER — FLUTICASONE PROPIONATE 50 MCG
2 SPRAY, SUSPENSION (ML) NASAL DAILY
Qty: 16 G | Refills: 0 | Status: SHIPPED | OUTPATIENT
Start: 2018-09-26 | End: 2018-09-26 | Stop reason: SDUPTHER

## 2018-09-26 RX ORDER — AMLODIPINE BESYLATE 2.5 MG/1
TABLET ORAL
Qty: 270 TABLET | Refills: 0
Start: 2018-09-26 | End: 2018-11-05 | Stop reason: SDUPTHER

## 2018-09-26 RX ORDER — FLUTICASONE PROPIONATE 50 MCG
SPRAY, SUSPENSION (ML) NASAL
Qty: 48 ML | Refills: 0 | Status: SHIPPED | OUTPATIENT
Start: 2018-09-26 | End: 2019-01-21

## 2018-09-26 RX ORDER — MINERAL OIL
180 ENEMA (ML) RECTAL DAILY
Qty: 30 TABLET | Refills: 0 | Status: SHIPPED | OUTPATIENT
Start: 2018-09-26 | End: 2018-10-15 | Stop reason: SDUPTHER

## 2018-09-26 NOTE — PROGRESS NOTES
Subjective:       Patient ID: Delores M Cryer is a 82 y.o. female.    Chief Complaint: Fall    82 y old female with dementia , a fib on eliquis  and hx of bilateral hip replacement  Here to be evaluated after sustaining fall  on Monday  at 3 am on her way to use restroom.  witnessed. He states one of walker wheel was broke. It has been replaced. She denies any pain anywhere . This is her 3rd fall  over the last 3 m .She has seen [facility].  Also with non productive cough for 3 m . Former smoker. + Post nasal drip . No gerd , no wheezes, no sob       Review of Systems   Constitutional: Negative.    HENT: Negative.    Eyes: Negative.    Respiratory: Negative.    Cardiovascular: Negative.    Gastrointestinal: Negative.    Genitourinary: Negative.    Musculoskeletal: Negative.    Skin: Negative.    Hematological: Negative.        Objective:      Physical Exam   Constitutional: She is oriented to person, place, and time. She appears well-developed and well-nourished. No distress.   HENT:   Head: Normocephalic and atraumatic.   Right Ear: External ear normal.   Left Ear: External ear normal.   Mouth/Throat: No oropharyngeal exudate.   Eyes: Conjunctivae and EOM are normal. Pupils are equal, round, and reactive to light. Right eye exhibits no discharge. Left eye exhibits no discharge. No scleral icterus.   Neck: Normal range of motion. Neck supple. No JVD present. No tracheal deviation present. No thyromegaly present.   Cardiovascular: Normal rate, regular rhythm and normal heart sounds. Exam reveals no gallop and no friction rub.   No murmur heard.  Pulmonary/Chest: Effort normal and breath sounds normal. No stridor. No respiratory distress. She has no wheezes. She has no rales. She exhibits no tenderness.   Abdominal: Soft. Bowel sounds are normal. She exhibits no distension. There is no tenderness. There is no rebound and no guarding.   Musculoskeletal: Normal range of motion.        Right hip: Normal.        Left  hip: Normal.        Legs:  10 cm in diam fading hematoma    Lymphadenopathy:     She has no cervical adenopathy.   Neurological: She is alert and oriented to person, place, and time. Gait abnormal.   Skin: Skin is warm and dry. She is not diaphoretic.   Psychiatric: She has a normal mood and affect. Her behavior is normal. Judgment and thought content normal.       Assessment:      Tania was seen today for fall.    Diagnoses and all orders for this visit:    Frequent falls  -     Ambulatory Referral to Physical/Occupational Therapy    Chronic cough  -     X-Ray Chest PA And Lateral; Future      Plan:    PT oT  CXR   High BP will increase amlodipine . F.u in 2  w  Tania was seen today for fall.    Diagnoses and all orders for this visit:    Frequent falls  -     Ambulatory Referral to Physical/Occupational Therapy    Chronic cough  -     X-Ray Chest PA And Lateral; Future    Other orders  -     Discontinue: fluticasone (FLONASE) 50 mcg/actuation nasal spray; 2 sprays (100 mcg total) by Each Nare route once daily.  -     fexofenadine (ALLEGRA) 180 MG tablet; Take 1 tablet (180 mg total) by mouth once daily.  -     amLODIPine (NORVASC) 2.5 MG tablet; 2 tabs in am 1 tab in pm  -     Influenza - High Dose (65+) (PF) (IM)

## 2018-09-28 ENCOUNTER — LAB VISIT (OUTPATIENT)
Dept: LAB | Facility: HOSPITAL | Age: 83
End: 2018-09-28
Payer: MEDICARE

## 2018-09-28 DIAGNOSIS — F03.91 DEMENTIA WITH BEHAVIORAL DISTURBANCE, UNSPECIFIED DEMENTIA TYPE: ICD-10-CM

## 2018-09-28 DIAGNOSIS — R79.9 ABNORMAL FINDING OF BLOOD CHEMISTRY: ICD-10-CM

## 2018-09-28 DIAGNOSIS — I10 HYPERTENSION, UNSPECIFIED TYPE: ICD-10-CM

## 2018-09-28 DIAGNOSIS — E78.5 DYSLIPIDEMIA: ICD-10-CM

## 2018-09-28 DIAGNOSIS — E03.9 HYPOTHYROIDISM, UNSPECIFIED TYPE: ICD-10-CM

## 2018-09-28 LAB
ALBUMIN SERPL BCP-MCNC: 3.6 G/DL
ALP SERPL-CCNC: 199 U/L
ALT SERPL W/O P-5'-P-CCNC: 19 U/L
ANION GAP SERPL CALC-SCNC: 7 MMOL/L
AST SERPL-CCNC: 22 U/L
BASOPHILS # BLD AUTO: 0.05 K/UL
BASOPHILS NFR BLD: 0.6 %
BILIRUB SERPL-MCNC: 2 MG/DL
BUN SERPL-MCNC: 18 MG/DL
CALCIUM SERPL-MCNC: 9.9 MG/DL
CHLORIDE SERPL-SCNC: 109 MMOL/L
CHOLEST SERPL-MCNC: 110 MG/DL
CHOLEST/HDLC SERPL: 2.7 {RATIO}
CO2 SERPL-SCNC: 24 MMOL/L
CREAT SERPL-MCNC: 0.8 MG/DL
DIFFERENTIAL METHOD: ABNORMAL
EOSINOPHIL # BLD AUTO: 0.1 K/UL
EOSINOPHIL NFR BLD: 1.5 %
ERYTHROCYTE [DISTWIDTH] IN BLOOD BY AUTOMATED COUNT: 13 %
EST. GFR  (AFRICAN AMERICAN): >60 ML/MIN/1.73 M^2
EST. GFR  (NON AFRICAN AMERICAN): >60 ML/MIN/1.73 M^2
ESTIMATED AVG GLUCOSE: 108 MG/DL
GLUCOSE SERPL-MCNC: 104 MG/DL
HBA1C MFR BLD HPLC: 5.4 %
HCT VFR BLD AUTO: 44.6 %
HDLC SERPL-MCNC: 41 MG/DL
HDLC SERPL: 37.3 %
HGB BLD-MCNC: 14.7 G/DL
IMM GRANULOCYTES # BLD AUTO: 0.01 K/UL
IMM GRANULOCYTES NFR BLD AUTO: 0.1 %
LDLC SERPL CALC-MCNC: 55.6 MG/DL
LYMPHOCYTES # BLD AUTO: 1.5 K/UL
LYMPHOCYTES NFR BLD: 17.8 %
MCH RBC QN AUTO: 30 PG
MCHC RBC AUTO-ENTMCNC: 33 G/DL
MCV RBC AUTO: 91 FL
MONOCYTES # BLD AUTO: 0.8 K/UL
MONOCYTES NFR BLD: 8.8 %
NEUTROPHILS # BLD AUTO: 6.1 K/UL
NEUTROPHILS NFR BLD: 71.2 %
NONHDLC SERPL-MCNC: 69 MG/DL
NRBC BLD-RTO: 0 /100 WBC
PLATELET # BLD AUTO: 194 K/UL
PMV BLD AUTO: 11.6 FL
POTASSIUM SERPL-SCNC: 4.4 MMOL/L
PROT SERPL-MCNC: 6.6 G/DL
RBC # BLD AUTO: 4.9 M/UL
SODIUM SERPL-SCNC: 140 MMOL/L
T4 FREE SERPL-MCNC: 1.89 NG/DL
TRIGL SERPL-MCNC: 67 MG/DL
TSH SERPL DL<=0.005 MIU/L-ACNC: 0.01 UIU/ML
WBC # BLD AUTO: 8.56 K/UL

## 2018-09-28 PROCEDURE — 80061 LIPID PANEL: CPT

## 2018-09-28 PROCEDURE — 84443 ASSAY THYROID STIM HORMONE: CPT

## 2018-09-28 PROCEDURE — 85025 COMPLETE CBC W/AUTO DIFF WBC: CPT

## 2018-09-28 PROCEDURE — 83036 HEMOGLOBIN GLYCOSYLATED A1C: CPT

## 2018-09-28 PROCEDURE — 83018 HEAVY METAL QUAN EACH NES: CPT

## 2018-09-28 PROCEDURE — 84439 ASSAY OF FREE THYROXINE: CPT

## 2018-09-28 PROCEDURE — 36415 COLL VENOUS BLD VENIPUNCTURE: CPT | Mod: PO

## 2018-09-28 PROCEDURE — 80053 COMPREHEN METABOLIC PANEL: CPT

## 2018-10-01 ENCOUNTER — TELEPHONE (OUTPATIENT)
Dept: FAMILY MEDICINE | Facility: CLINIC | Age: 83
End: 2018-10-01

## 2018-10-01 DIAGNOSIS — E80.6 HYPERBILIRUBINEMIA: Primary | ICD-10-CM

## 2018-10-01 DIAGNOSIS — E03.9 HYPOTHYROIDISM, UNSPECIFIED TYPE: Primary | ICD-10-CM

## 2018-10-01 LAB — COBALT SERPL-MCNC: 1.8 NG/ML

## 2018-10-01 RX ORDER — LEVOTHYROXINE SODIUM 112 UG/1
TABLET ORAL
Qty: 90 TABLET | Refills: 1 | Status: SHIPPED | OUTPATIENT
Start: 2018-10-01

## 2018-10-01 RX ORDER — LEVOTHYROXINE SODIUM 112 UG/1
125 TABLET ORAL DAILY
Qty: 30 TABLET | Refills: 1 | Status: SHIPPED | OUTPATIENT
Start: 2018-10-01 | End: 2018-10-01 | Stop reason: SDUPTHER

## 2018-10-02 ENCOUNTER — PATIENT MESSAGE (OUTPATIENT)
Dept: FAMILY MEDICINE | Facility: CLINIC | Age: 83
End: 2018-10-02

## 2018-10-05 ENCOUNTER — OFFICE VISIT (OUTPATIENT)
Dept: GASTROENTEROLOGY | Facility: CLINIC | Age: 83
End: 2018-10-05
Payer: MEDICARE

## 2018-10-05 VITALS
DIASTOLIC BLOOD PRESSURE: 74 MMHG | SYSTOLIC BLOOD PRESSURE: 128 MMHG | HEART RATE: 78 BPM | BODY MASS INDEX: 34.08 KG/M2 | HEIGHT: 62 IN | WEIGHT: 185.19 LBS

## 2018-10-05 DIAGNOSIS — R17 ELEVATED BILIRUBIN: Primary | ICD-10-CM

## 2018-10-05 DIAGNOSIS — R74.8 ELEVATED ALKALINE PHOSPHATASE LEVEL: ICD-10-CM

## 2018-10-05 PROCEDURE — 3078F DIAST BP <80 MM HG: CPT | Mod: CPTII,,, | Performed by: NURSE PRACTITIONER

## 2018-10-05 PROCEDURE — 99204 OFFICE O/P NEW MOD 45 MIN: CPT | Mod: S$PBB,,, | Performed by: NURSE PRACTITIONER

## 2018-10-05 PROCEDURE — 99214 OFFICE O/P EST MOD 30 MIN: CPT | Mod: PBBFAC,PO | Performed by: NURSE PRACTITIONER

## 2018-10-05 PROCEDURE — 1101F PT FALLS ASSESS-DOCD LE1/YR: CPT | Mod: CPTII,,, | Performed by: NURSE PRACTITIONER

## 2018-10-05 PROCEDURE — 3074F SYST BP LT 130 MM HG: CPT | Mod: CPTII,,, | Performed by: NURSE PRACTITIONER

## 2018-10-05 PROCEDURE — 99999 PR PBB SHADOW E&M-EST. PATIENT-LVL IV: CPT | Mod: PBBFAC,,, | Performed by: NURSE PRACTITIONER

## 2018-10-05 NOTE — LETTER
October 5, 2018      Viviana Melchor MD  [address] [facility] [place] [address]           [facility] - Gastroenterology  [address] [place] [address]  Phone: [phone]  Fax: [phone]          Patient: Delores M Cryer   MR Number: [id number]   YOB: 1935   Date of Visit: 10/5/2018       Dear Dr. Viviana Melchor:    Thank you for referring Delores Cryer to me for evaluation. Attached you will find relevant portions of my assessment and plan of care.    If you have questions, please do not hesitate to call me. I look forward to following Delores Cryer along with you.    Sincerely,    Daylin Palnecia, ADELE    Enclosure  CC:  No Recipients    If you would like to receive this communication electronically, please contact [email or web address] or [phone] to request more information on [facility] access.    For providers and/or their staff who would like to refer a patient to [facility], please contact us through our one-stop-shop provider referral line, [facility] , at [phone].    If you feel you have received this communication in error or would no longer like to receive these types of communications, please e-mail [email or web address]

## 2018-10-05 NOTE — PROGRESS NOTES
Clinic Consult:  [facility] Consultation Note    Reason for Consult:  The primary encounter diagnosis was Elevated bilirubin. A diagnosis of Elevated alkaline phosphatase level was also pertinent to this visit.    PCP: Viviana Melchor   [address] [place] [address] / [facility] [id number]    HPI:  This is a 82 y.o. female here for evaluation of the above  Pt is here with her .  HPI obtained from him as she has some dementia with decreased memory  Pt was referred by PCP for additional investigation of elevated bilirubin.   Pt states that she has been feeling well without any GI complaints.  Has occasional loose stool, diet dependant.   Denies any abdominal pain.  No nausea or vomiting.  No change in bowel pattern.  No melena or hematochezia. No weight loss.  No upper GI bleeding.  No ascites or BLE.  No overt confusion.  No episodes of jaundice. Denies any previous liver disease.     Review of Systems   Unable to perform ROS: Dementia       Medical History:   Past Medical History:   Diagnosis Date    Atrial fibrillation     Coronary artery disease     Elevated cholesterol     Hypertension     Macular degeneration     Thyroid condition        Surgical History:  Past Surgical History:   Procedure Laterality Date    APPENDECTOMY      BACK SURGERY      x2    CARDIAC CATHETERIZATION  2016    CATARACT EXTRACTION      CHOLECYSTECTOMY      CORONARY ANGIOPLASTY  09/2015    non-obstructive CAD    EYE SURGERY      HEART CATH-LEFT Left 9/21/2015    [facility] LAB    HIP SURGERY Bilateral     HYSTERECTOMY      KNEE SURGERY Bilateral     x 2 each       Family History:   Family History   Problem Relation Age of Onset    Cataracts Mother     Glaucoma Mother     Heart disease Mother     Diabetes Sister     Diabetes Maternal Grandmother     Hypertension Father        Social History:   Social History     Tobacco Use    Smoking status: Former Smoker      "Packs/day: 1.00     Years: 30.00     Pack years: 30.00     Types: Cigarettes     Last attempt to quit: 1983     Years since quittin.7    Smokeless tobacco: Never Used   Substance Use Topics    Alcohol use: No    Drug use: No       Allergies: Reviewed    Home Medications:   Current Outpatient Medications on File Prior to Visit   Medication Sig Dispense Refill    amLODIPine (NORVASC) 2.5 MG tablet 2 tabs in am 1 tab in pm 270 tablet 0    apixaban (ELIQUIS) 5 mg Tab Take 1 tablet (5 mg total) by mouth 2 (two) times daily. 180 tablet 3    atorvastatin (LIPITOR) 40 MG tablet Take 1 tablet (40 mg total) by mouth every evening. 90 tablet 0    calcium carbonate-vit D3-min (CALTRATE 600+D PLUS MINERALS) 600 mg (1,500 mg)-400 unit Chew Take 1 tablet by mouth Twice daily.      citalopram (CELEXA) 20 MG tablet Take 1 tablet (20 mg total) by mouth once daily. 90 tablet 0    fexofenadine (ALLEGRA) 180 MG tablet Take 1 tablet (180 mg total) by mouth once daily. 30 tablet 0    fluticasone (FLONASE) 50 mcg/actuation nasal spray USE 2 SPRAYS IN EACH NOSTRIL ONCE DAILY 48 mL 0    isosorbide mononitrate (IMDUR) 30 MG 24 hr tablet Take 1 tablet (30 mg total) by mouth once daily. 30 tablet 0    levothyroxine (SYNTHROID) 112 MCG tablet TAKE 1 TABLET BY MOUTH EVERY DAY 90 tablet 1    metoprolol succinate (TOPROL-XL) 100 MG 24 hr tablet TAKE 1 TABLET BY MOUTH TWICE A  tablet 0    MULTIVITAMIN W-MINERALS/LUTEIN (CENTRUM SILVER ORAL) Take 1 tablet by mouth.      omeprazole (PRILOSEC) 40 MG capsule TAKE 1 CAPSULE BY MOUTH EVERY DAY 90 capsule 1    triamcinolone acetonide 0.1% (KENALOG) 0.1 % cream Apply topically 2 (two) times daily. for 10 days 80 g 0     No current facility-administered medications on file prior to visit.        Physical Exam:  Vital Signs:  /74   Pulse 78   Ht 5' 2" (1.575 m)   Wt 84 kg (185 lb 3 oz)   BMI 33.87 kg/m²   Body mass index is 33.87 kg/m².  Physical Exam "   Constitutional: She is oriented to person, place, and time. She appears well-developed and well-nourished.   HENT:   Head: Normocephalic.   Eyes: No scleral icterus.   Neck: Normal range of motion.   Cardiovascular: Normal rate and regular rhythm.   Pulmonary/Chest: Effort normal and breath sounds normal.   Abdominal: Soft. Bowel sounds are normal. She exhibits no distension. There is no tenderness.   Musculoskeletal: Normal range of motion.   Neurological: She is alert and oriented to person, place, and time.   Skin: Skin is warm and dry.   Psychiatric: She has a normal mood and affect.   Vitals reviewed.      Labs: Pertinent labs reviewed.  Assessment:  1. Elevated bilirubin    2. Elevated alkaline phosphatase level         Recommendations:  - Labs have been stably elevated since 2015.  - Will get US abdomen to R/O any issues with the bile duct  - Pt and  are not interested in extensive workup at this time.     Elevated bilirubin  -     US Abdomen Complete; Future; Expected date: 10/05/2018    Elevated alkaline phosphatase level  -     US Abdomen Complete; Future; Expected date: 10/05/2018        Follow up to be determined by results of above.        Thank you so much for allowing me to participate in the care of Tania M Cryer Mia P Oubre, FNP-C

## 2018-10-10 ENCOUNTER — OFFICE VISIT (OUTPATIENT)
Dept: FAMILY MEDICINE | Facility: CLINIC | Age: 83
End: 2018-10-10
Payer: MEDICARE

## 2018-10-10 VITALS
BODY MASS INDEX: 33.87 KG/M2 | SYSTOLIC BLOOD PRESSURE: 120 MMHG | TEMPERATURE: 97 F | HEIGHT: 62 IN | OXYGEN SATURATION: 97 % | DIASTOLIC BLOOD PRESSURE: 68 MMHG | HEART RATE: 91 BPM | WEIGHT: 184.06 LBS

## 2018-10-10 DIAGNOSIS — I10 HYPERTENSION, UNSPECIFIED TYPE: Primary | ICD-10-CM

## 2018-10-10 DIAGNOSIS — I70.0 AORTIC ATHEROSCLEROSIS: ICD-10-CM

## 2018-10-10 PROCEDURE — 99999 PR PBB SHADOW E&M-EST. PATIENT-LVL III: CPT | Mod: PBBFAC,,, | Performed by: FAMILY MEDICINE

## 2018-10-10 PROCEDURE — 3078F DIAST BP <80 MM HG: CPT | Mod: CPTII,,, | Performed by: FAMILY MEDICINE

## 2018-10-10 PROCEDURE — 99213 OFFICE O/P EST LOW 20 MIN: CPT | Mod: S$PBB,,, | Performed by: FAMILY MEDICINE

## 2018-10-10 PROCEDURE — 99213 OFFICE O/P EST LOW 20 MIN: CPT | Mod: PBBFAC,PO | Performed by: FAMILY MEDICINE

## 2018-10-10 PROCEDURE — 3074F SYST BP LT 130 MM HG: CPT | Mod: CPTII,,, | Performed by: FAMILY MEDICINE

## 2018-10-10 PROCEDURE — 99499 UNLISTED E&M SERVICE: CPT | Mod: S$GLB,,, | Performed by: FAMILY MEDICINE

## 2018-10-10 PROCEDURE — 1101F PT FALLS ASSESS-DOCD LE1/YR: CPT | Mod: CPTII,,, | Performed by: FAMILY MEDICINE

## 2018-10-10 NOTE — PROGRESS NOTES
Subjective:       Patient ID: Delores M Cryer is a 82 y.o. female.    Chief Complaint: Follow-up    82 y old female with HTN here for f.u after increasing amlodipine to 7.5 mg qd. Doing well . Daughter in law arranges meds.  is not certain if she is taking  7.5 mg qd      Review of Systems   Constitutional: Negative.    HENT: Negative.    Eyes: Negative.    Respiratory: Negative.    Cardiovascular: Negative.    Gastrointestinal: Negative.    Genitourinary: Negative.    Musculoskeletal: Negative.    Skin: Negative.    Hematological: Negative.        Objective:      Physical Exam   Constitutional: She is oriented to person, place, and time. She appears well-developed and well-nourished. No distress.   HENT:   Head: Normocephalic and atraumatic.   Right Ear: External ear normal.   Left Ear: External ear normal.   Mouth/Throat: No oropharyngeal exudate.   Eyes: Conjunctivae and EOM are normal. Pupils are equal, round, and reactive to light. Right eye exhibits no discharge. Left eye exhibits no discharge. No scleral icterus.   Neck: Normal range of motion. Neck supple. No JVD present. No tracheal deviation present. No thyromegaly present.   Cardiovascular: Normal rate, regular rhythm and normal heart sounds. Exam reveals no gallop and no friction rub.   No murmur heard.  Pulmonary/Chest: Effort normal and breath sounds normal. No stridor. No respiratory distress. She has no wheezes. She has no rales. She exhibits no tenderness.   Abdominal: Soft. Bowel sounds are normal. She exhibits no distension. There is no tenderness. There is no rebound and no guarding.   Musculoskeletal: Normal range of motion.   Lymphadenopathy:     She has no cervical adenopathy.   Neurological: She is alert and oriented to person, place, and time.   Skin: Skin is warm and dry. She is not diaphoretic.   Psychiatric: She has a normal mood and affect. Her behavior is normal. Judgment and thought content normal.       Assessment:     Tania  was seen today for follow-up.    Diagnoses and all orders for this visit:    Hypertension, unspecified type    Aortic atherosclerosis      Plan:   Controlled.comt med  Amlodipine was highlighted  on AVS.    will check with daughter in law if amlodipine dose was increased   asymptomatic . Cont statin .  F.u in 6 m

## 2018-10-12 ENCOUNTER — TELEPHONE (OUTPATIENT)
Dept: RADIOLOGY | Facility: HOSPITAL | Age: 83
End: 2018-10-12

## 2018-10-15 ENCOUNTER — HOSPITAL ENCOUNTER (OUTPATIENT)
Dept: RADIOLOGY | Facility: HOSPITAL | Age: 83
Discharge: HOME | End: 2018-10-15
Attending: NURSE PRACTITIONER
Payer: MEDICARE

## 2018-10-15 DIAGNOSIS — R74.8 ELEVATED ALKALINE PHOSPHATASE LEVEL: ICD-10-CM

## 2018-10-15 DIAGNOSIS — R17 ELEVATED BILIRUBIN: ICD-10-CM

## 2018-10-15 PROCEDURE — 76700 US EXAM ABDOM COMPLETE: CPT | Mod: TC

## 2018-10-15 PROCEDURE — 76700 US EXAM ABDOM COMPLETE: CPT | Mod: 26,,, | Performed by: RADIOLOGY

## 2018-10-15 RX ORDER — MINERAL OIL
ENEMA (ML) RECTAL
Qty: 30 TABLET | Refills: 0 | Status: SHIPPED | OUTPATIENT
Start: 2018-10-15 | End: 2018-11-19 | Stop reason: SDUPTHER

## 2018-10-17 ENCOUNTER — PATIENT MESSAGE (OUTPATIENT)
Dept: GASTROENTEROLOGY | Facility: CLINIC | Age: 83
End: 2018-10-17

## 2018-10-29 RX ORDER — METOPROLOL SUCCINATE 100 MG/1
TABLET, EXTENDED RELEASE ORAL
Qty: 60 TABLET | Refills: 0 | Status: SHIPPED | OUTPATIENT
Start: 2018-10-29 | End: 2018-10-29 | Stop reason: SDUPTHER

## 2018-10-29 RX ORDER — METOPROLOL SUCCINATE 100 MG/1
TABLET, EXTENDED RELEASE ORAL
Qty: 180 TABLET | Refills: 0 | Status: SHIPPED | OUTPATIENT
Start: 2018-10-29 | End: 2019-01-29 | Stop reason: SDUPTHER

## 2018-11-02 ENCOUNTER — OFFICE VISIT (OUTPATIENT)
Dept: NEUROLOGY | Facility: CLINIC | Age: 83
End: 2018-11-02
Payer: MEDICARE

## 2018-11-02 VITALS
DIASTOLIC BLOOD PRESSURE: 60 MMHG | SYSTOLIC BLOOD PRESSURE: 170 MMHG | BODY MASS INDEX: 33.87 KG/M2 | HEIGHT: 62 IN | HEART RATE: 64 BPM | WEIGHT: 184.06 LBS | RESPIRATION RATE: 20 BRPM

## 2018-11-02 DIAGNOSIS — G30.1 LATE ONSET ALZHEIMER'S DISEASE WITHOUT BEHAVIORAL DISTURBANCE: Primary | ICD-10-CM

## 2018-11-02 DIAGNOSIS — F02.80 LATE ONSET ALZHEIMER'S DISEASE WITHOUT BEHAVIORAL DISTURBANCE: Primary | ICD-10-CM

## 2018-11-02 PROCEDURE — 1101F PT FALLS ASSESS-DOCD LE1/YR: CPT | Mod: CPTII,S$GLB,, | Performed by: PSYCHIATRY & NEUROLOGY

## 2018-11-02 PROCEDURE — 99214 OFFICE O/P EST MOD 30 MIN: CPT | Mod: S$GLB,,, | Performed by: PSYCHIATRY & NEUROLOGY

## 2018-11-02 PROCEDURE — 99999 PR PBB SHADOW E&M-EST. PATIENT-LVL III: CPT | Mod: PBBFAC,,, | Performed by: PSYCHIATRY & NEUROLOGY

## 2018-11-02 PROCEDURE — 3077F SYST BP >= 140 MM HG: CPT | Mod: CPTII,S$GLB,, | Performed by: PSYCHIATRY & NEUROLOGY

## 2018-11-02 PROCEDURE — 3078F DIAST BP <80 MM HG: CPT | Mod: CPTII,S$GLB,, | Performed by: PSYCHIATRY & NEUROLOGY

## 2018-11-02 PROCEDURE — 99213 OFFICE O/P EST LOW 20 MIN: CPT | Mod: PBBFAC | Performed by: PSYCHIATRY & NEUROLOGY

## 2018-11-02 RX ORDER — DONEPEZIL HYDROCHLORIDE 5 MG/1
5 TABLET, FILM COATED ORAL NIGHTLY
Qty: 30 TABLET | Refills: 0 | Status: SHIPPED | OUTPATIENT
Start: 2018-11-02 | End: 2018-12-03 | Stop reason: SDUPTHER

## 2018-11-02 NOTE — PROGRESS NOTES
Subjective:      Patient ID: Delores M Cryer is a 83 y.o. female.    Chief Complaint: Follow-up for late onset Alzheimer disease     The patient and her daughter return today for follow-up with the daughter providing the history indicating that the symptoms of dementia seem to be worsening.  According to the daughter, she now has assistance for bathing 3 times a week even though her  has been also helpful.  The patient is unable to prepare her food.  She will eat after set up.  She has had.  As of incontinence of both stool and urine.  The patient's daughter is now providing food for the couple, cooking for them and then placing the food in the freezer for them to heat at a later time.  The daughter has apparently become the sole caregiver even though she has 2 brothers who have not been active in helping.    The patient has been found to have an elevated cobalt level.  This has occurred as a result of a hip prosthesis that has degenerated he after being in place even for a year.  The question is raised by the daughter is whether not the cobalt may have had an effect on producing the symptoms of dementia.  In review of that issue, the recommendation is to remove the prosthesis.    Patient's daughter indicates that the symptoms of confusion and disorientation certainly do seem to fluctuate from day-to-day and even within the day.  At times, the patient does not seem to recognize her  .  The patient's daughter indicates that she always seems to recognize her but does not seem to recognize other family members that are not seen on a very regular basis.  As an example, a granddaughter who lives in [place] was visiting and was not recognized by the patient.    The patient's  continues to live in the home and is able to provide supervision.  However he does need assistance for bathing as the patient would not allow him to assist her in bathing.  She remains ambulatory with a single-point cane but walks  with a very slow labored movement.          ROS:  GENERAL: NO FEVER, CHILLS,   OR NIGHT SWEATS  SKIN: NO RASHES, ITCHING OR CHANGES IN COLOR OR TEXTURE OF SKIN.  HEAD: NO HEADACHES OR RECENT HEAD TRAUMA.  EYES: VISUAL ACUITY FINE. NO PHOTOPHOBIA, OCULAR PAIN OR DIPLOPIA.  EARS: DENIES EAR PAIN, DISCHARGE OR VERTIGO.  NOSE: NO LOSS OF SMELL, NO EPISTAXIS OR POSTNASAL DRIP.  MOUTH & THROAT: NO HOARSENESS OR CHANGE IN VOICE. NO EXCESSIVE GUM BLEEDING.  NODES: DENIES SWOLLEN GLANDS.  CHEST: DENIES BOWMAN, CYANOSIS, WHEEZING, COUGH AND SPUTUM PRODUCTION.  CARDIOVASCULAR: DENIES CHEST PAIN, PND, ORTHOPNEA   ABDOMEN: APPETITE FINE. NO WEIGHT LOSS. DENIES DIARRHEA, ABDOMINAL PAIN, HEMATEMESIS OR BLOOD IN STOOL.  URINARY: NO FLANK PAIN, DYSURIA OR HEMATURIA.  PERIPHERAL VASCULAR: NO CLAUDICATION OR CYANOSIS.  MUSCULOSKELETAL:  HAS HAD HIP AND LOW BACK PAIN.  NEUROLOGIC: NO HISTORY OF SEIZURES, PARALYSIS,  OR UNEXPLAINED LOSS OF CONSCIOUSNESS    Past Medical History:   Diagnosis Date    Atrial fibrillation     Coronary artery disease     Elevated cholesterol     Hypertension     Macular degeneration     Thyroid condition      Past Surgical History:   Procedure Laterality Date    APPENDECTOMY      BACK SURGERY      x2    CARDIAC CATHETERIZATION  2016    CATARACT EXTRACTION      CHOLECYSTECTOMY      CORONARY ANGIOPLASTY  09/2015    non-obstructive CAD    EYE SURGERY      HEART CATH-LEFT Left 9/21/2015    [facility] CATH LAB    HIP SURGERY Bilateral     HYSTERECTOMY      KNEE SURGERY Bilateral     x 2 each     Family History   Problem Relation Age of Onset    Cataracts Mother     Glaucoma Mother     Heart disease Mother     Diabetes Sister     Diabetes Maternal Grandmother     Hypertension Father      Social History     Socioeconomic History    Marital status:      Spouse name: Not on file    Number of children: Not on file    Years of education: Not on file    Highest  "education level: Not on file   Social Needs    Financial resource strain: Not on file    Food insecurity - worry: Not on file    Food insecurity - inability: Not on file    Transportation needs - medical: Not on file    Transportation needs - non-medical: Not on file   Occupational History    Not on file   Tobacco Use    Smoking status: Former Smoker     Packs/day: 1.00     Years: 30.00     Pack years: 30.00     Types: Cigarettes     Last attempt to quit: 1983     Years since quittin.8    Smokeless tobacco: Never Used   Substance and Sexual Activity    Alcohol use: No    Drug use: No    Sexual activity: Not on file   Other Topics Concern    Not on file   Social History Narrative    Not on file         Objective:   PE:   VITAL SIGNS:   Vitals:    18 0918   BP: (!) 170/60   Pulse: 64   Resp: 20     APPEARANCE: WELL NOURISHED, WELL DEVELOPED,  AS SHE SITS QUIETLY IN THE EXAM ROOM WHILE THE DAUGHTER PROVIDES THE HISTORY.   HEAD: NORMOCEPHALIC, ATRAUMATIC.  EYES: PERRL. EOMI.  NON-ICTERIC SCLERAE.    EARS: TM'S INTACT. LIGHT REFLEX NORMAL. NO RETRACTION OR PERFORATION.    NOSE: MUCOSA PINK. AIRWAY CLEAR.  MOUTH & THROAT: NO TONSILLAR ENLARGEMENT. NO PHARYNGEAL ERYTHEMA OR EXUDATE. NO STRIDOR.  NECK: SUPPLE. NO BRUITS.  MUSCULOSKELETAL:  NO BONY DEFORMITY SEEN.  MUSCLE TONE IS NORMAL.  MUSCLE MASS IS NORMAL.    NEUROLOGIC:   MENTAL STATUS:   THE PATIENT IS ATTENTIVE TO THE ENVIRONMENT AND COOPERATIVE FOR THE EXAM.  THE PATIENT SEEMS TO BE AWARE THAT SHE IS IN A DOCTOR'S OFFICE.  SHE DOES ATTEMPT TO FOLLOW SIMPLE ONE-STEP COMMANDS.  SHE HAS VERY LITTLE SPONTANEOUS SPEECH BUT WOULD ANSWER QUESTIONS YES AND NO.  AT TIMES, SHE SEEMS TO HAVE INSIGHT INTO HER CONDITION AS SHE STATES, " I DO NOT KNOW WHO I AM. "  CRANIAL NERVES: II-XII GROSSLY INTACT. FUNDOSCOPIC EXAM IS NORMAL.  NO HEMORRHAGE, EXUDATE OR PAPILLEDEMA IS PRESENT. THE EXTRAOCULAR MUSCLES ARE INTACT IN THE CARDINAL DIRECTIONS OF GAZE.  " NO PTOSIS IS PRESENT. FACIAL FEATURES ARE SYMMETRICAL.  SPEECH IS NORMAL IN FLUENCY, DICTION, AND PHRASING.  TONGUE PROTRUDES IN THE MIDLINE.    GAIT AND STATION:  ROMBERG IS NEGATIVE.  GOOD ALTERNATE ARMSWING WITH NORMAL GAIT.  MOTOR:  NO DOWNDRIFT OF EITHER ARM WHEN HELD AT SHOULDER LEVEL.  MANUAL MUSCLE TESTING OF PROXIMAL AND DISTAL MUSCLES OF BOTH UPPER AND LOWER EXTREMITIES IS NORMAL. MUSCLE MASS IS NORMAL.  MUSCLE TONE IS NORMAL.  SENSORY:    SENSORY EXAMINATION IS FELT TO BE UNRELIABLE DUE TO PATIENT'S DEMENTIA  CEREBELLAR:    NO INVOLUNTARY MOVEMENTS OR TREMOR SEEN.  REFLEXES:  STRETCH REFLEXES ARE 2+ BOTH UPPER AND LOWER EXTREMITIES.  PLANTAR STIMULATION IS FLEXOR BILATERALLY AND NO PATHOLOGICAL REFLEXES ARE SEEN              Assessment:     Encounter Diagnosis   Name Primary?    Late onset Alzheimer's disease without behavioral disturbance Yes        DISCUSSION:     The patient has obviously had a progression of her symptoms of dementia.  The patient has become increasingly dependent for instrumental activities of daily living.  The patient's daughter is providing care along with the patient's .  The patient seems to be safe in her home environment at this time.  The daughter expresses frustration with the progression of the illness and questioned the role of cobalt toxicity in producing the symptoms as described.  The literature was quickly reviewed that is available indicating that the recommendation is to remove the prosthesis and to replace it with a more suitable prosthesis in the hip.  The literature also indicates that it is not clear as to how to treat the elevated cobalt level      Plan:      1. Begin Aricept 5 mg once a day for 30 days and then increase to 10 mg that he thereafter  2.  Routine follow-up with Neurology in 6 months with frequent messaging through the patient portal    This was a 45 min extended visit with the patient and the patient's daughter with 75% of the time spent  counseling the daughter regarding the findings on examination as well as discussion of dementia and its progression.    This note is generated with speech recognition software and is subject to transcription error and sound alike phrases that may be missed by proofreading.

## 2018-11-02 NOTE — PATIENT INSTRUCTIONS
Alzheimer Disease  Alzheimer disease is a brain illness that can happen usually in older adults, but it can also happen as early as age 40. It is the most common cause of dementia. It is a progressive disease. This means it gets worse over time.  What is Alzheimer disease?  Alzheimer disease causes a series of changes to nerves of the brain. Some nerves form into clumps and tangles, and lose some of their connections to other nerves.  Healthcare providers dont fully understand what causes Alzheimer disease. But they think these may be some of the causes:  · Age and family history  · Certain genes  · Abnormal protein deposits in the brain  · Environmental factors  · Problems with a persons immune system  · Possibly infections  Symptoms of Alzheimer disease  The disease causes changes in behavior and thinking known as dementia. The symptoms include:  · Memory loss  · Confusion  · Restlessness  · Personality and behavior changes  · Problems with judgment  · Problems communicating with others  · Inability to follow directions  · Lack of emotion  Diagnosing Alzheimer disease  No single test is able to diagnose Alzheimer disease. Instead healthcare providers use a series of tests to rule out other health conditions. The tests may include:  · A complete medical history. This may include questions about overall health and past health problems. The healthcare provider may ask how well the person can do daily tasks. The healthcare provider may ask family or close friends about any changes in behavior or personality.  · Mental status test. This is a test of memory, problem solving, attention, counting, and language.   · Standard medical tests. These may include blood and urine tests to find possible causes for the problem.  · Brain imaging tests.  CT, MRI, or positron emission tomography (PET) may be used to rule out other causes of the problem.  Treating Alzheimer disease  Alzheimer disease has no cure. Instead healthcare  providers can help ease some symptoms. This can make a person with Alzheimer more comfortable. Treatment can also make it easier for their caregivers to take care of them.  Some medicines may help slow the decline of a persons memory, thinking, and language skills. They may help with problems of behavior, such as aggression. They can lessen hallucinations and delusions. These medicines can work for some but not all people. And they may help for only a limited time. Medicines include:  · Cholinesterase inhibitors  · Donepezil  · Galantamine  · Rivastigmine  · Memantine  In some cases, behavior problems can be caused by medicine side effects. Talk with the persons healthcare provider about all medicines he or she is taking.  Keeping healthy  For a person with Alzheimer, its important to stay healthy. Good nutrition and physical and social activity are vital. A calm and well-structured environment will help. Make sure to keep up with healthcare appointments and managing other health conditions, such as diabetes. Some patients benefit from having a nutritionist help to prevent weight loss.    Caring for someone with Alzheimer  A person with Alzheimer will need more caregiving over time. Talk with your healthcare provider about caregiving resources.   Date Last Reviewed: 11/12/2015  © 4008-1339 [employer]. [address] [place] [address]. All rights reserved. This information is not intended as a substitute for professional medical care. Always follow your healthcare professional's instructions.        Alzheimer's Dementia and Caregiver Support   Alzheimer's dementia (AD) is a chronic, progressive condition that affects the brain. It causes a gradual loss of memory and higher intellectual functions. A person with AD may have trouble recognizing familiar people and places, or knowing what day it is. The persons memory, judgment, and decision-making may also be affected. In severe cases, the  person may not respond when someone talks to him or her.  AD is the most common form of dementia. Doctors dont fully understand what causes AD. It has no cure. But medicines can treat some of the symptoms.  Home care  These tips can help you care for a person with AD at home:  · A responsible person must be with someone who has advanced AD at all times.  He or she should not be left alone or unsupervised.  · In the case of advanced AD, keep all medicines in a secure place. They should be under the caregivers control. A person with advanced AD should not be allowed to take his or her own medicines. This needs to be supervised by the caregiver.  Here are ways to help a person with dementia:  Activities  Keep to a daily routine. Changes in routine can cause stress for someone with dementia. Make a schedule for common daily tasks. These include bathing, dressing, taking medicines, eating meals, going for walks, and going to bed.  Communication  When talking to a person with dementia, talk slowly and clearly. Use a gentle tone of voice. Choose short, simple words and sentences. Ask one question at a time. Dont interrupt, criticize, or argue. Be calm and supportive. Use friendly facial expressions. Use pointing and touching to help communicate. If the person has a loss of long-term memory, dont ask questions about past events. Instead, talk about what is happening now.  Behavioral tips  Use lists, signs, family photos, clocks, and calendars as memory aids. Label cabinets and drawers. Try to distract, not confront, the person. When he or she becomes frustrated or upset, direct the persons attention to eating or some other interesting activity.  Medical-legal tips  Talk with your doctor or  about getting a power of  for healthcare and for financial decisions. It is best to do this while the person can still sign legal documents and make his or her own legal decisions. Otherwise, you'll need a court  order.  Support for the caregiver  As the caregiver, you will need a lot of support for yourself. Caring for a person with dementia is a full-time job. It can drain your emotions and lead to frustration and anger toward the one you love. It is common to have feelings of grief over losing the relationship that you once had. As a caregiver to someone with dementia, you are at higher risk for depression, anxiety and stress.  Here are some tips to help you cope with being a caregiver:  · Learn about dementia and Alzheimers disease so you know what to expect.  · Find out about the resources in your community, including adult day-care programs. Ask your healthcare provider for a referral to a , if needed.  · Take care of yourself with a healthy diet, exercise, and plenty of rest.  · Ask for help. Share some of the caretaking duties with family and friends.  · Make personal time for yourself. This is essential! Consider hiring an in-home sitter or home health aide.  · Seek counseling or join a caregivers support group. Don't isolate yourself or try to cope with this alone. In a support group, you can learn from others in a similar situation.  · Visit the Alzheimers Association website (www.alz.org) for more information.  Follow-up care  Follow up with the persons healthcare provider, or as advised.  When to seek medical advice  Call your loved-one's healthcare provider right away if any of these occur:  · Frequent falls  · The person refuses to eat or drink  · Violent behavior or behavior becomes too difficult to manage at home  · Increased drowsiness, or failure to respond normally  · Headache or nausea that gets worse, or repeated vomiting  · Numbness or weakness of the face, an arm, or a leg  · Slurred speech, trouble speaking, walking, or seeing  · Fainting spell, dizziness, or seizure  · Unexplained fever of 100.4º F (38.0º C) or higher  · Seizure like activity (twitching, staring episodes, lip  smacking, sudden periods of worsening confusion)  Date Last Reviewed: 8/1/2016  © 9485-4614 [employer]. [address] [place] [address], [place] [address]. All rights reserved. This information is not intended as a substitute for professional medical care. Always follow your healthcare professional's instructions.        For Caregivers: Safety Tips for Dementia Patients  The symptoms of dementia can sometimes lead to unsafe situations. Areas of special concern include driving and wandering away from home. You may also need to make changes in your loved ones living space. These can help protect your loved one even when you arent around.  Discourage driving  Driving is often not safe for a person with dementia. It may even be illegal. Ask the healthcare provider whether its safe for your loved one to drive. If safety is in question, use these tips to prevent him or her from getting behind the wheel:  · Limit access to the car. Keep the keys with you or lock them away.  · Ask an authority figure, such as a healthcare provider or , to tell your loved one not to drive.  · Ask your healthcare provider about contacting the Department of Motor Vehicles.  Watch for wandering  People with dementia may wander away from the house and get lost. To keep your loved one safer, try these tips:  · Have your loved one wear an ID bracelet at all times. You can also enroll your loved one in the Alzheimers Associations Safe Return program.  · Install door chimes so you know when exterior doors are being opened.  · Ask neighbors to call you if they see your loved one out alone.  · Go with your loved one if he or she insists on leaving the house. Dont argue or yell. Instead, use distraction or gentle hints to get him or her to return home.  · People with dementia often have a reversed sleep-wake cycle, meaning that they can be up all night and wander away when you least expect it.    Make living spaces  safe  Keep your loved one safe by simplifying his or her living space. This means reducing clutter and removing hazards. You may also want to get advice from an occupational therapist (home safety expert). Keep in mind that some changes may not be needed right away. Focus on major safety concerns first.  In living spaces  Suggested safety steps include:  · Install smoke alarms and nightlights.  · Display emergency numbers and the home address near all phones.  · Install an answering machine so important messages arent missed.  · Reduce tripping hazards. Move electrical and phone cords out of the way. Place colored tape on the edges of steps.  In the bathroom  Suggested safety steps include:  · Store hair dryers, razors, and curling irons in a secure area.  · Remove poisons, such as  and nail polish remover.  · Keep medicines in a secure area, not in the medicine cabinet.  · Remove inside door locks so your loved one doesnt get locked inside.  In the kitchen  Suggested safety steps include:  · Unplug toasters and other appliances when not in use.  · Limit access to alcoholic beverages. These can make symptoms much worse.  · Remove or cover knobs on stoves and other appliances.  · Check food for spoilage. Your loved one may not know when food has gone bad.  Other areas of the home  Suggested safety steps include:  · Lock up hazardous substances, such as bleach, pesticides, and paint thinners.  · Keep pool or hot tub areas closed off.  · Set the hot water heater below 120°F (48.8°C).  · Keep a spare key outside the house in case your loved one locks you out.  Prevent fraud  People with dementia may be easy prey for dishonest salespeople or money scams. Try placing a No Solicitations sign on your loved ones front door. Add his or her phone number to the [employer]s Do Not Call list ([phone]). You should also limit access to credit cards and cash.  Can my loved one live  alone?  Early on, people with dementia can often handle daily tasks with little or no help. At some point, though, it will no longer be safe for them to be on their own. The timing for this is different for each person. But problems such as forgetting to eat, bathe, or take medicines can all be signs that more supervision is needed. If you have concerns, be sure to talk with your loved ones healthcare provider.   Date Last Reviewed: 10/2/2015  © 6752-7829 [employer] [address] [place] [address]. All rights reserved. This information is not intended as a substitute for professional medical care. Always follow your healthcare professional's instructions.        Caring for End-Stage Dementia  Dementia is the loss of brain function that occurs with certain brain conditions. It affects memory and makes it harder for a person to think clearly and communicate. As dementia slowly worsens, disability increases. Over time, a person loses the ability to care for himself or herself. If your loved one has now progressed to end-stage (advanced) dementia, it may help to know what to expect and plan for what lies ahead.     The healthcare provider and healthcare team will meet with you to discuss your loved ones treatment plan.   Understanding end-stage dementia  Symptoms are different for each person. But in general, dementia has 3 basic stages. Each stage can last a few months to years. End-stage dementia is the last, and often most stressful, stage for both patients and families. Once this point is reached, your loved ones brain function will have severely declined. This will affect how the rest of your loved ones body functions. With end-stage dementia, your loved one may no longer:  · Recognize family members and friends  · Reason or have sound judgment  · Speak or understand language  · Have bowel and bladder control  · Eat or swallow properly. You may have to consider tube feeding or using a  device to provide nutrition. Although many experts recommend hand feeding instead.  · Walk properly. He or she may need a wheelchair or be bed-bound.  · Do normal daily tasks. He or she will need constant care.  Advance care planning  It's important to try to plan ahead for care before your loved one is still mentally aware enough to make decisions. This can help your loved one avoid treatments that he or she does not want or need. This care planning usually takes the form of a written advance directive. This document outlines what health care your loved one does and does not want done when he or she is no longer able to speak for himself or herself. Your loved one should also have a healthcare proxy in place. This lists the person who will make healthcare decisions for your loved one when he or she is not able to make or communicate those decisions.  Revisiting your loved ones care plan  Because of the extent of the physical and mental changes that can occur with end-stage dementia, your loved ones goals of care and treatment plan may need to change. Your loved ones doctor and healthcare team can help guide you through this process. When meeting with the team, you and others involved in your loved ones care may want to ask:  · How much longer does our loved one have to live?  · How can symptoms be managed at this time?  · What treatments might be helpful?  · What are the risks and benefits of these treatments?  · How will these treatments help with overall health and comfort?  These questions may lead to further discussions about end-of-life care. Although these discussions can be difficult, remember that the goal is to provide the best care and quality of life for your loved one. Think about conversations you may have shared about the kind of treatments your loved one wants at the end of life. Consider their personal values or jazmine. Also ask for advice from those who share those values.  Considering care and  placement options  With end-stage dementia, your loved ones caregiving needs will greatly increase. If you are still caring for your loved one at home, you may want to explore other care options at this time. These may include:  · Private sitter services. A private sitter is a special type of caregiver. This persons main job is to monitor and keep the patient company. Sitter services are sometimes covered by long-term care insurance plans.   · Placement in a nursing home or skilled nursing facility. This type of facility assists with tasks of daily living. It also provides constant medical care. Trained healthcare providers, skilled nurses, and therapists are onsite to assist with care.  · Hospice care. This is end-of-life care that can be done in a skilled nursing facility, hospice center, or at home. Hospice care focuses on ensuring comfort and enhancing quality of life in the time your loved one has left. It also provides physical, emotional, and spiritual support for both patients and families.  Deciding whether to move your loved one to a facility or to end-of-life care can be upsetting. But know that youre not alone in this process. Your loved ones healthcare team can help address your questions and concerns. You can also seek advice from a , , or .  Getting support  Coping with your loved ones condition can wear you down over time. Grief, anger, fear, and worry--these are all normal emotions. Rather than dealing with your emotions alone, it may help to reach out to others. Talking with other family members and friends may help. Joining a support group for families and caregivers of loved ones with dementia may also help. You can seek support from your loved ones healthcare team as well. You can also contact your [facility] to find other resources within your community.   Date Last Reviewed: 4/1/2017  © 8168-3799 [employer]. [address] [place] [address]  [facility], [place] [address]. All rights reserved. This information is not intended as a substitute for professional medical care. Always follow your healthcare professional's instructions.        For Caregivers: Future Planning for People with Dementia  The time will come when your loved one can no longer make sound decisions. So its best to plan now for the future. Talk with your loved one about legal and financial matters. You should also discuss the types of care he or she wants. Settling these issues now can help reassure both of you.    Discuss legal and financial issues  Legal and financial planning is always a good idea. But its even more vital when a loved one has dementia. How will finances be handled? Who will pay bills? Talking about these matters can be both emotional and complex. So you may wish to seek advice from professionals. These include financial planners, insurance agents, estate-planning attorneys, and social workers.  Durable power of   This document transfers financial and legal power from your loved one to you or to some other person who can make decisions in your loved ones best interest.  Advance directives and living covarrubias  These documents spell out the kinds of medical treatment your loved one wants -- or doesnt want -- in the future. Keep them with your loved ones medical records.  Long-term care expenses  Medicare, Medicaid, and private insurers all limit the types of care they will cover. Talk with a  about how long-term care expenses can be handled.  Joint accounts  Talk with your loved one about becoming a cosigner on his or her financial accounts. This helps make sure bills are paid and allows you to keep track of spending.  Prepare for role changes  As your loved ones needs change, so will your role as caregiver. At first, you may only need to help with minor tasks. Later, your loved one may require constant supervision. Planning for these changes now  can make it easier to cope. Start by talking with family and friends. What tasks can they help with? Who will care for your loved one if you become ill? You should also learn about options for professional care. That way, its easier to move to the next step when the current situation stops working.  Consider caregiving options  Talk with a , doctor, or local support agency about options for care. These may include one or more of the following:  · Adult  provides supervised care during the day.  · Home health aides can be hired part-time, or as live-in caregivers.  · Assisted living facilities can provide a home and support for people who need moderate amounts of help.  · Nursing homes provide constant care.  Date Last Reviewed: 7/1/2016 © 2000-2017 [employer]. [address] [place] [address], [place] [address]. All rights reserved. This information is not intended as a substitute for professional medical care. Always follow your healthcare professional's instructions.

## 2018-11-06 RX ORDER — AMLODIPINE BESYLATE 2.5 MG/1
TABLET ORAL
Qty: 180 TABLET | Refills: 0 | Status: SHIPPED | OUTPATIENT
Start: 2018-11-06 | End: 2018-12-05 | Stop reason: SDUPTHER

## 2018-11-12 RX ORDER — OMEPRAZOLE 40 MG/1
40 CAPSULE, DELAYED RELEASE ORAL DAILY
Qty: 90 CAPSULE | Refills: 1 | Status: SHIPPED | OUTPATIENT
Start: 2018-11-12 | End: 2019-05-07 | Stop reason: SDUPTHER

## 2018-11-19 RX ORDER — MINERAL OIL
180 ENEMA (ML) RECTAL DAILY
Qty: 30 TABLET | Refills: 0 | Status: SHIPPED | OUTPATIENT
Start: 2018-11-19 | End: 2018-12-17 | Stop reason: SDUPTHER

## 2018-12-03 RX ORDER — DONEPEZIL HYDROCHLORIDE 5 MG/1
TABLET, FILM COATED ORAL
Qty: 90 TABLET | Refills: 0 | Status: SHIPPED | OUTPATIENT
Start: 2018-12-03 | End: 2019-03-04 | Stop reason: SDUPTHER

## 2018-12-05 RX ORDER — AMLODIPINE BESYLATE 2.5 MG/1
TABLET ORAL
Qty: 180 TABLET | Refills: 0 | Status: SHIPPED | OUTPATIENT
Start: 2018-12-05 | End: 2018-12-20

## 2018-12-10 RX ORDER — CITALOPRAM 20 MG/1
20 TABLET, FILM COATED ORAL DAILY
Qty: 90 TABLET | Refills: 0 | Status: SHIPPED | OUTPATIENT
Start: 2018-12-10 | End: 2019-03-18 | Stop reason: SDUPTHER

## 2018-12-10 RX ORDER — ATORVASTATIN CALCIUM 40 MG/1
TABLET, FILM COATED ORAL
Qty: 90 TABLET | Refills: 0 | Status: SHIPPED | OUTPATIENT
Start: 2018-12-10 | End: 2019-03-18 | Stop reason: SDUPTHER

## 2018-12-17 RX ORDER — MINERAL OIL
180 ENEMA (ML) RECTAL DAILY
Qty: 30 TABLET | Refills: 3 | Status: SHIPPED | OUTPATIENT
Start: 2018-12-17 | End: 2019-01-21

## 2018-12-20 ENCOUNTER — HOSPITAL ENCOUNTER (OUTPATIENT)
Dept: RADIOLOGY | Facility: HOSPITAL | Age: 83
Discharge: HOME | End: 2018-12-20
Attending: INTERNAL MEDICINE
Payer: MEDICARE

## 2018-12-20 ENCOUNTER — OFFICE VISIT (OUTPATIENT)
Dept: CARDIOLOGY | Facility: CLINIC | Age: 83
End: 2018-12-20
Payer: MEDICARE

## 2018-12-20 VITALS
DIASTOLIC BLOOD PRESSURE: 64 MMHG | WEIGHT: 209.69 LBS | BODY MASS INDEX: 38.59 KG/M2 | HEIGHT: 62 IN | HEART RATE: 72 BPM | SYSTOLIC BLOOD PRESSURE: 154 MMHG

## 2018-12-20 DIAGNOSIS — I51.89 DIASTOLIC DYSFUNCTION: ICD-10-CM

## 2018-12-20 DIAGNOSIS — I48.20 CHRONIC ATRIAL FIBRILLATION: Chronic | ICD-10-CM

## 2018-12-20 DIAGNOSIS — I25.2 OLD MYOCARDIAL INFARCTION: ICD-10-CM

## 2018-12-20 DIAGNOSIS — E78.49 OTHER HYPERLIPIDEMIA: Chronic | ICD-10-CM

## 2018-12-20 DIAGNOSIS — I25.10 CORONARY ARTERY DISEASE DUE TO CALCIFIED CORONARY LESION: Primary | ICD-10-CM

## 2018-12-20 DIAGNOSIS — R06.09 OTHER FORM OF DYSPNEA: ICD-10-CM

## 2018-12-20 DIAGNOSIS — I10 ESSENTIAL HYPERTENSION: Chronic | ICD-10-CM

## 2018-12-20 DIAGNOSIS — I25.84 CORONARY ARTERY DISEASE DUE TO CALCIFIED CORONARY LESION: Primary | ICD-10-CM

## 2018-12-20 PROCEDURE — 1101F PT FALLS ASSESS-DOCD LE1/YR: CPT | Mod: CPTII,HCNC,S$GLB, | Performed by: INTERNAL MEDICINE

## 2018-12-20 PROCEDURE — 3077F SYST BP >= 140 MM HG: CPT | Mod: CPTII,HCNC,S$GLB, | Performed by: INTERNAL MEDICINE

## 2018-12-20 PROCEDURE — 99499 UNLISTED E&M SERVICE: CPT | Mod: S$GLB,,, | Performed by: INTERNAL MEDICINE

## 2018-12-20 PROCEDURE — 71046 X-RAY EXAM CHEST 2 VIEWS: CPT | Mod: TC,HCNC

## 2018-12-20 PROCEDURE — 3078F DIAST BP <80 MM HG: CPT | Mod: CPTII,HCNC,S$GLB, | Performed by: INTERNAL MEDICINE

## 2018-12-20 PROCEDURE — 99214 OFFICE O/P EST MOD 30 MIN: CPT | Mod: HCNC,S$GLB,, | Performed by: INTERNAL MEDICINE

## 2018-12-20 PROCEDURE — 99999 PR PBB SHADOW E&M-EST. PATIENT-LVL IV: CPT | Mod: PBBFAC,HCNC,, | Performed by: INTERNAL MEDICINE

## 2018-12-20 PROCEDURE — 71046 X-RAY EXAM CHEST 2 VIEWS: CPT | Mod: 26,HCNC,, | Performed by: RADIOLOGY

## 2018-12-20 RX ORDER — WARFARIN SODIUM 5 MG/1
5 TABLET ORAL DAILY
Qty: 30 TABLET | Refills: 3 | Status: SHIPPED | OUTPATIENT
Start: 2018-12-20 | End: 2019-07-31 | Stop reason: SDUPTHER

## 2018-12-20 RX ORDER — FUROSEMIDE 20 MG/1
20 TABLET ORAL DAILY PRN
Qty: 60 TABLET | Refills: 3 | Status: SHIPPED | OUTPATIENT
Start: 2018-12-20 | End: 2019-07-12 | Stop reason: SDUPTHER

## 2018-12-20 RX ORDER — AMLODIPINE BESYLATE 5 MG/1
5 TABLET ORAL 2 TIMES DAILY
Qty: 60 TABLET | Refills: 3 | Status: SHIPPED | OUTPATIENT
Start: 2018-12-20 | End: 2019-04-24 | Stop reason: SDUPTHER

## 2018-12-20 NOTE — PROGRESS NOTES
Subjective:   Patient ID:  Delores M Cryer is a 83 y.o. female who presents for cardiac consult of Atrial Fibrillation      HPI  The patient came in today for cardiac consult of Atrial Fibrillation    This is an 83 year old female pt with current medical conditions CAD, Chronic AF, HTN presents for CV follow up.      6/28/18  She was started on Eliquis 1 year ago when she was admitted to [facility]. She has seen Dr. Martino in the past about 2 years ago at which point she was only on ASA but then started on Eliquis 1 year ago by Dr. Mitchell. Her  is present and the room and described she has mild dementia. She has no recent falls or bleeding. Does have occasional bruising.     12/20/18  Recent Carotids with mild plaque b/l. She has been started on Aricept by Dr. Laureano for dementia. Bp was was elevated last visit with PCP, Norvasc increased. She also has mild SOB/BOWMAN.  has said Eliquis costs > $400/month, will change to coumadin and enroll her in home health for INR check and PT/OT if needed. Mild leg swelling.     Patient feels  no chest pain,  no PND, no palpitation, no dizziness, no syncope, no CNS symptoms.    Patient is compliant with medications.    2D ECHO  CONCLUSIONS     1 - Normal left ventricular systolic function (EF 60-65%).     2 - Normal left ventricular diastolic function.     3 - Normal right ventricular systolic function .     4 - Concentric hypertrophy.     This document has been electronically    SIGNED BY: Richard Mitchell MD On: 07/12/2017 13:40    Carotid U/S  CONCLUSIONS   There is 20 - 39% right Internal Carotid stenosis.  There is 20 - 39% left Internal Carotid stenosis.    This document has been electronically    SIGNED BY: Camila Robb MD On: 07/03/2018 17:55  Past Medical History:   Diagnosis Date    Acute coronary syndrome     Atrial fibrillation     Coronary artery disease     Elevated cholesterol     Hypertension     Macular degeneration     Thyroid condition        Past  Surgical History:   Procedure Laterality Date    APPENDECTOMY      BACK SURGERY      x2    CARDIAC CATHETERIZATION  2016    CATARACT EXTRACTION      CHOLECYSTECTOMY      CORONARY ANGIOPLASTY  2015    non-obstructive CAD    EYE SURGERY      HEART CATH-LEFT Left 2015    Performed by Camila Robb MD at [facility]    HIP SURGERY Bilateral     HYSTERECTOMY      KNEE SURGERY Bilateral     x 2 each       Social History     Tobacco Use    Smoking status: Former Smoker     Packs/day: 1.00     Years: 30.00     Pack years: 30.00     Types: Cigarettes     Last attempt to quit: 1983     Years since quittin.9    Smokeless tobacco: Never Used   Substance Use Topics    Alcohol use: No    Drug use: No       Family History   Problem Relation Age of Onset    Cataracts Mother     Glaucoma Mother     Heart disease Mother     Diabetes Sister     Diabetes Maternal Grandmother     Hypertension Father           Medication List           Accurate as of 18  9:47 AM. If you have any questions, ask your nurse or doctor.               START taking these medications    furosemide 20 MG tablet  Commonly known as:  LASIX  Take 1 tablet (20 mg total) by mouth daily as needed. Take next 3 days for leg swelling/breathing issues  Started by:  Silverio Ro Md, MD     warfarin 5 MG tablet  Commonly known as:  COUMADIN  Take 1 tablet (5 mg total) by mouth Daily.  Started by:  Silverio Ro Md, MD        CHANGE how you take these medications    amLODIPine 5 MG tablet  Commonly known as:  NORVASC  Take 1 tablet (5 mg total) by mouth 2 (two) times daily.  What changed:    · medication strength  · how much to take  Changed by:  Silverio Ro Md, MD        CONTINUE taking these medications    atorvastatin 40 MG tablet  Commonly known as:  LIPITOR  TAKE 1 TABLET( 40 MG TOTAL) BY MOUTH EVERY EVENING.     CALTRATE 600-D PLUS MINERALS 600 mg (1,500 mg)-400 unit Chew  Generic drug:  calcium carbonate-vit D3-min      CENTRUM SILVER ORAL     citalopram 20 MG tablet  Commonly known as:  CELEXA  Take 1 tablet (20 mg total) by mouth once daily.     CITRUCEL ORAL     donepezil 5 MG tablet  Commonly known as:  ARICEPT  TAKE 1 TABLET BY MOUTH EVERY EVENING.     fexofenadine 180 MG tablet  Commonly known as:  ALLEGRA  Take 1 tablet (180 mg total) by mouth once daily.     fluticasone 50 mcg/actuation nasal spray  Commonly known as:  FLONASE  USE 2 SPRAYS IN EACH NOSTRIL ONCE DAILY     levothyroxine 112 MCG tablet  Commonly known as:  SYNTHROID  TAKE 1 TABLET BY MOUTH EVERY DAY     metoprolol succinate 100 MG 24 hr tablet  Commonly known as:  TOPROL-XL  TAKE 1 TABLET BY MOUTH TWICE DAILY     omeprazole 40 MG capsule  Commonly known as:  PRILOSEC  Take 1 capsule (40 mg total) by mouth once daily.        STOP taking these medications    apixaban 5 mg Tab  Commonly known as:  ELIQUIS  Stopped by:  Silverio Ro Md, MD     isosorbide mononitrate 30 MG 24 hr tablet  Commonly known as:  IMDUR  Stopped by:  Silverio Ro Md, MD           Where to Get Your Medications      These medications were sent to [facility] [address] [place], [place] [id number] [place] [address] AT [facility] & [facility]  [address] [address] [address]    Phone:  [phone]   · amLODIPine 5 MG tablet  · furosemide 20 MG tablet  · warfarin 5 MG tablet         Review of Systems   Constitutional: Negative.    HENT: Negative.    Eyes: Negative.    Respiratory: Positive for shortness of breath.    Cardiovascular: Positive for leg swelling.   Gastrointestinal: Negative.    Genitourinary: Negative.    Musculoskeletal: Negative.    Skin: Negative.    Neurological: Negative.    Endo/Heme/Allergies: Negative.    Psychiatric/Behavioral: Negative.    All 12 systems otherwise negative.      Wt Readings from Last 3 Encounters:   12/20/18 95.1 kg (209 lb 10.5 oz)   11/02/18 83.5 kg (184 lb 1.4 oz)   10/10/18 83.5 kg (184 lb 1.4 oz)     Temp Readings from Last 3  "Encounters:   10/10/18 96.8 °F (36 °C) (Tympanic)   09/26/18 97.2 °F (36.2 °C) (Tympanic)   08/06/18 98.1 °F (36.7 °C) (Tympanic)     BP Readings from Last 3 Encounters:   12/20/18 (!) 154/64   11/02/18 (!) 170/60   10/10/18 120/68     Pulse Readings from Last 3 Encounters:   12/20/18 72   11/02/18 64   10/10/18 91       BP (!) 154/64 (BP Method: Large (Manual))   Pulse 72   Ht 5' 2" (1.575 m)   Wt 95.1 kg (209 lb 10.5 oz)   BMI 38.35 kg/m²     Objective:   Physical Exam   Constitutional: She is oriented to person, place, and time. She appears well-developed and well-nourished. No distress.   HENT:   Head: Normocephalic and atraumatic.   Nose: Nose normal.   Mouth/Throat: Oropharynx is clear and moist.   Eyes: Conjunctivae and EOM are normal. No scleral icterus.   Neck: Normal range of motion. Neck supple. No JVD present. No thyromegaly present.   Cardiovascular: Normal rate, S1 normal and S2 normal. An irregularly irregular rhythm present. Exam reveals no gallop, no S3, no S4 and no friction rub.   No murmur heard.  Pulmonary/Chest: Effort normal and breath sounds normal. No stridor. No respiratory distress. She has no wheezes. She has no rales. She exhibits no tenderness.   Abdominal: Soft. Bowel sounds are normal. She exhibits no distension and no mass. There is no tenderness. There is no rebound.   Genitourinary:   Genitourinary Comments: Deferred   Musculoskeletal: Normal range of motion. She exhibits edema (1+). She exhibits no tenderness or deformity.   Lymphadenopathy:     She has no cervical adenopathy.   Neurological: She is alert and oriented to person, place, and time. She exhibits normal muscle tone. Coordination normal.   Skin: Skin is warm and dry. No rash noted. She is not diaphoretic. No erythema. No pallor.   Psychiatric: She has a normal mood and affect. Her behavior is normal. Judgment and thought content normal.   Nursing note and vitals reviewed.      Lab Results   Component Value Date    "  09/28/2018    K 4.4 09/28/2018     09/28/2018    CO2 24 09/28/2018    BUN 18 09/28/2018    CREATININE 0.8 09/28/2018     09/28/2018    HGBA1C 5.4 09/28/2018    MG 2.3 02/10/2004    AST 22 09/28/2018    ALT 19 09/28/2018    ALBUMIN 3.6 09/28/2018    PROT 6.6 09/28/2018    BILITOT 2.0 (H) 09/28/2018    WBC 8.56 09/28/2018    HGB 14.7 09/28/2018    HCT 44.6 09/28/2018    MCV 91 09/28/2018     09/28/2018    INR 1.0 09/20/2015    INR 1.9 (H) 05/18/2009    TSH 0.012 (L) 09/28/2018    CHOL 110 (L) 09/28/2018    HDL 41 09/28/2018    LDLCALC 55.6 (L) 09/28/2018    TRIG 67 09/28/2018     (H) 05/23/2018     Assessment:      1. Coronary artery disease due to calcified coronary lesion    2. Diastolic dysfunction    3. Old myocardial infarction    4. Chronic atrial fibrillation    5. Other hyperlipidemia    6. Essential hypertension        Plan:   1. Chronic AF  - rate controlled  - start Coumadin, Enroll in coumadin clinic   - h/o CVA needs to continue a/c due to high CHADSVASC    2. CAD  - no angina  - cont meds    3. HTN - mildly elevated today   - not taking Imdur  - will increase Norvasc to 5mg BID    4. HFpEF  - mild overload  - discussed low salt diet  - lasix 20 mg next 3 days and PRN  - CXR ordered today     5. HLD  - cont statin    6. Carotid bruit/pior CVA  - mild plaque  - cont meds     Thank you for allowing me to participate in this patient's care. Please do not hesitate to contact me with any questions or concerns. Consult note has been forwarded to the referral physician.

## 2018-12-20 NOTE — PROGRESS NOTES
Patient, Delores M Cryer (MRN #[id number]), presented with a recorded BMI of 38.35 kg/m^2 and a documented comorbidity(s):  - Hypertension  - Hyperlipidemia  - Atrial Fibrillation  to which the severe obesity is a contributing factor. This is consistent with the definition of severe obesity (BMI 35.0-39.9) with comorbidity (ICD-10 E66.01, Z68.35). The patient's severe obesity was monitored, evaluated, addressed and/or treated. This addendum to the medical record is made on 12/20/2018.

## 2018-12-20 NOTE — PATIENT INSTRUCTIONS
Understanding Atrial Fibrillation    An arrhythmia is any problem with the speed or pattern of the heartbeat. Atrial fibrillation (AFib) is a common type of arrhythmia. It causes fast, chaotic electrical signals in the atria. This leads to poor functioning of the heart. It also affects how much blood your heart can pump out to the body.  Afib may occur once in a while and go away on its own. Or it may continue for longer periods and need treatment.  AFib can lead to serious problems, such as stroke. Your healthcare provider will need to monitor and manage it.  What happens during atrial fibrillation?   The heart has an electrical system that sends signals to control the heartbeat. As signals move through the heart, they tell the hearts upper chambers (atria) and lower chambers (ventricles) when to squeeze (contract) and relax. This lets blood move through the heart and out to the body and lungs.  With AFib, the atria receive abnormal signals. This causes them to contract in a fast and irregular way, and out of sync with the ventricles. When this happens, the atria also have a harder time moving blood into the ventricles. Blood may then pool in the atria, which increases the risk for blood clots and stroke. The ventricles also may contract too quickly and irregularly. As a result, they may not pump blood to the body and lungs as well as they should. This can weaken the heart muscle over time and cause heart failure.  What causes atrial fibrillation?  AFib is more common in older adults. It has many possible causes including:  · Coronary artery disease  · Heart valve disease  · Heart attack  · Heart surgery  · High blood pressure  · Thyroid disease  · Diabetes  · Lung disease  · Sleep apnea  · Heavy alcohol use  In some cases of AFib, doctors do not know the cause.  What are the symptoms of atrial fibrillation?  AFib may or may not cause symptoms. If symptoms do occur, they may include:  · A fast, pounding,  irregular heartbeat  · Shortness of breath  · Tiredness  · Dizziness or fainting  · Chest pain  How is atrial fibrillation treated?  Treatments for AFib can include any of the options below.  · Medicines. You may be prescribed:  ¨ Heart rate medicines to help slow down the heartbeat  ¨ Heart rhythm medicines to help the heart beat more regularly  ¨ Anti-clotting medicines to help reduce the risk for blood clots and stroke  · Electrical cardioversion. Your healthcare provider uses special pads or paddles to send one or more brief electrical shocks to the heart. This can help reset the heartbeat to normal.  · Ablation. Long, thin tubes called catheters are threaded through a blood vessel to the heart. There, the catheters send out hot or cold energy to the areas causing the abnormal signals. This energy destroys the problem tissue or cells. This improves the chances that your heart will stay in normal rhythm without using medicines. If your heart rate and rhythm cant be controlled, you may need ablation and a pacemaker. These will help control the heart rate and regularity of the heartbeat.  · Surgery. During surgery, your healthcare provider may use different methods to create scar tissue in the areas of the heart causing the abnormal signals. The scar tissue disrupts the abnormal signals and may stop AFib from occurring.  What are the complications of atrial fibrillation?  These can include:  · Blood clots  · Stroke  · Heart failure. This problem occurs when the heart muscle weakens so much that it can no longer pump blood well.  When should I call my healthcare provider?  Call your healthcare provider right away if you have any of these:  · Symptoms that dont get better with treatment, or get worse  · New symptoms   Date Last Reviewed: 5/1/2016  © 2760-6269 [employer]. [address] [place] [address], [place] [address]. All rights reserved. This information is not intended as a substitute for professional  medical care. Always follow your healthcare professional's instructions.

## 2018-12-24 ENCOUNTER — LAB VISIT (OUTPATIENT)
Dept: LAB | Facility: HOSPITAL | Age: 83
End: 2018-12-24
Attending: INTERNAL MEDICINE
Payer: MEDICARE

## 2018-12-24 ENCOUNTER — TELEPHONE (OUTPATIENT)
Dept: CARDIOLOGY | Facility: CLINIC | Age: 83
End: 2018-12-24

## 2018-12-24 DIAGNOSIS — I48.20 CHRONIC ATRIAL FIBRILLATION: Chronic | ICD-10-CM

## 2018-12-24 LAB
INR PPP: 1.1
PROTHROMBIN TIME: 11.8 SEC

## 2018-12-24 PROCEDURE — 36415 COLL VENOUS BLD VENIPUNCTURE: CPT | Mod: HCNC,PO

## 2018-12-24 PROCEDURE — 85610 PROTHROMBIN TIME: CPT | Mod: HCNC

## 2018-12-24 NOTE — TELEPHONE ENCOUNTER
Returned daughter-in-law's call regarding eliquis.  Patient will continue taking Eliquis along with coumadin until INR ~2.0/near therapeutic range.        [facility]enn[facility] JO[facility] Coumadin Monitoring Enrollment   Caller: Unspecified (Today, 10:44 AM)             Candelaria ( pt daughter ) is requesting a call from nurse to request a medication.               Please call Candelaria ( pt daughter back at [phone]

## 2018-12-26 ENCOUNTER — ANTI-COAG VISIT (OUTPATIENT)
Dept: CARDIOLOGY | Facility: CLINIC | Age: 83
End: 2018-12-26
Payer: MEDICARE

## 2018-12-26 DIAGNOSIS — Z79.01 LONG TERM (CURRENT) USE OF ANTICOAGULANTS: Primary | ICD-10-CM

## 2018-12-26 LAB — INR PPP: 3.2 (ref 2–3)

## 2018-12-26 PROCEDURE — 99211 OFF/OP EST MAY X REQ PHY/QHP: CPT | Mod: 25,HCNC,S$GLB, | Performed by: INTERNAL MEDICINE

## 2018-12-26 PROCEDURE — 85610 PROTHROMBIN TIME: CPT | Mod: QW,HCNC,S$GLB, | Performed by: INTERNAL MEDICINE

## 2018-12-26 NOTE — PROGRESS NOTES
Mrs Cryer has recently switched from Eliquis to coumadin.  Last dose of Eliquis on last night.  Patient agrees to maintain diet of 2 servings of greens per week.  Current coumadin dose 5mg every evening.      A full discussion of the nature of coumadin has been carried out.   The need for frequent and regular monitoring, precise dosage adjustment and compliance is stressed.  Side effects of potential bleeding are discussed.  The patient should avoid any OTC items containing aspirin or ibuprofen, and should avoid great swings in general diet.  Avoid alcohol consumption.  Call if any signs of abnormal bleeding.     Patient's INR is supra-therapeutic at 3.2..  No signs of bleeding noted; advised patient to seek immediate medical attention if she notices any abnormal bleeding.  Instructions given for patient to take coumadin 2.5mg on today and tomorrow (12/27); discontinue Eliquis.  Recheck on Friday, 12/28/2018.  Patient and caregivers voiced understanding.  Spoke with daughter-in-law January via telephone.        Taking Coumadin   Coumadin (warfarin) helps keep your blood from clotting. But it also increases your risk for bleeding. Because of this, it must be taken exactly as directed. You also need to protect yourself from injury.     Follow These Tips   Take Coumadin at the same time each day. If you miss a dose, take it as soon as you remember (if less then 4 hours); otherwise, if it's almost time for your next dose, skip the missed dose. Do not take a double dose.   Go for your blood (protime/INR) tests as often as directed. Note that diet and   medication can affect your protime/INR level.             REMEMBER:   When value decreases from therapeutic range, the blood                                           gets thicker and when value increases, the blood gets thinner.                                       Dont take any other medications without checking with your healthcare provider first. This includes aspirin,  vitamins, and herbal and other dietary supplements.   Tell all healthcare providers that you take Coumadin. Its also a good idea to carry a medical ID card or wear a medical-alert bracelet.   Use a soft toothbrush and an electric razor.   Dont go barefoot. And dont trim corns or calluses yourself.    When to Call Your Healthcare Provider   Call your healthcare provider right away before you take your next dose of Coumadin if you have any of these problems:   Bleeding that doesnt stop in 10 minutes   A heavier-than-normal period or bleeding between periods   Coughing or throwing up blood   Diarrhea or bleeding hemorrhoids   Dark urine or black stools   Red or black-and-blue marks on the skin that get larger   A fever or an illness that gets worse   Dizziness or fatigue   Chest pain or trouble breathing   A serious fall or a blow to the head    Keep Your Diet Steady   Keep your diet pretty much the same each day. Thats because many foods contain vitamin K. Vitamin K helps your blood clot. So eating foods that contain vitamin K can affect the way Coumadin works. You dont need to avoid foods that have vitamin K. But you do need to keep the amount of them you eat steady (about the same day to day). If you change your diet for any reason, such as due to illness or to lose weight, be sure to tell your doctor.     Examples of foods high in vitamin K are brussels sprouts, avocado, broccoli, cabbage, coleslaw, mustard greens, shiela greens, turnip greens, kale, spinach, fabien lettuce, and some other leafy green vegetables. Foods that are mixed with mayonnaise, such as tuna, chicken, and potato salads.  Oils, such as soybean, canola, and Vegetable oils, are also high in vitamin K.       Other food products can affect the way Coumadin works in your body:   Food products that may affect blood clotting include cranberries and cranberry juice, grapefruit juice, fish oil supplements, garlic, broderick, licorice, and turmeric.    Herbs used in herbal teas or supplements can also affect blood clotting. Keep the amount of herbal teas and supplements you use steady.   Alcohol can increase the effect of Coumadin in your body.       Education provided by Mae Friedman LPN.

## 2018-12-28 ENCOUNTER — ANTI-COAG VISIT (OUTPATIENT)
Dept: CARDIOLOGY | Facility: CLINIC | Age: 83
End: 2018-12-28
Payer: MEDICARE

## 2018-12-28 DIAGNOSIS — Z79.01 LONG TERM (CURRENT) USE OF ANTICOAGULANTS: Primary | ICD-10-CM

## 2018-12-28 LAB — INR PPP: 3.8 (ref 2–3)

## 2018-12-28 PROCEDURE — 85610 PROTHROMBIN TIME: CPT | Mod: QW,HCNC,S$GLB, | Performed by: INTERNAL MEDICINE

## 2018-12-28 PROCEDURE — 99211 OFF/OP EST MAY X REQ PHY/QHP: CPT | Mod: 25,HCNC,S$GLB,

## 2018-12-28 NOTE — PROGRESS NOTES
Patient's INR continues to supra-therapeutic at 3.8.  Patient has been able to follow all dosing instructions.  Mrs. Cryer started coumadin on 12/24; INR remains above goal, despite dose decrease.  No signs of bleeding noted; advised patient to seek immediate medical attention if she notices any abnormal bleeding.  Instructions given for patient to hold dose of coumadin on today and Monday, 12/31.  Patient will take 2.5mg on all other days.  Follow-up on 1/3/2019.  Patient and  voiced understanding.  Daughter-in-law, January also contacted with dosing instructions.

## 2019-01-03 ENCOUNTER — ANTI-COAG VISIT (OUTPATIENT)
Dept: CARDIOLOGY | Facility: CLINIC | Age: 84
End: 2019-01-03
Payer: MEDICARE

## 2019-01-03 DIAGNOSIS — Z79.01 LONG TERM (CURRENT) USE OF ANTICOAGULANTS: Primary | ICD-10-CM

## 2019-01-03 LAB — INR PPP: 2.8 (ref 2–3)

## 2019-01-03 PROCEDURE — 85610 PROTHROMBIN TIME: CPT | Mod: QW,HCNC,S$GLB, | Performed by: INTERNAL MEDICINE

## 2019-01-03 PROCEDURE — 85610 POCT INR: ICD-10-PCS | Mod: QW,HCNC,S$GLB, | Performed by: INTERNAL MEDICINE

## 2019-01-03 PROCEDURE — 99211 OFF/OP EST MAY X REQ PHY/QHP: CPT | Mod: 25,HCNC,S$GLB,

## 2019-01-03 PROCEDURE — 99211 PR OFFICE/OUTPT VISIT, EST, LEVL I: ICD-10-PCS | Mod: 25,HCNC,S$GLB,

## 2019-01-03 NOTE — PROGRESS NOTES
Patient's INR is therapeutic at 2.8.  Patient has been able to follow all dosing instructions.  INR is currently at goal with patient only taking 12.5mg of coumadin over the past week.  Instructions given for patient to decrease dose of coumadin to 2.5mg every evening except on Wednesdays; patient will skip dose of coumadin on Wednesdays (weekly dose of 15mg).  Follow-up in 1 week at [facility] per patient's request.  Patient and  voiced understanding. Daughter-in-law, January also contacted with dosing instructions.

## 2019-01-10 ENCOUNTER — ANTI-COAG VISIT (OUTPATIENT)
Dept: CARDIOLOGY | Facility: CLINIC | Age: 84
End: 2019-01-10

## 2019-01-10 ENCOUNTER — LAB VISIT (OUTPATIENT)
Dept: LAB | Facility: HOSPITAL | Age: 84
End: 2019-01-10
Attending: INTERNAL MEDICINE
Payer: MEDICARE

## 2019-01-10 DIAGNOSIS — Z79.01 LONG TERM (CURRENT) USE OF ANTICOAGULANTS: ICD-10-CM

## 2019-01-10 LAB
INR PPP: 2.5
PROTHROMBIN TIME: 26 SEC

## 2019-01-10 PROCEDURE — 36415 COLL VENOUS BLD VENIPUNCTURE: CPT | Mod: HCNC,PO

## 2019-01-10 PROCEDURE — 85610 PROTHROMBIN TIME: CPT | Mod: HCNC

## 2019-01-10 NOTE — PROGRESS NOTES
Patient's INR is therapeutic at 2.5.  Patient contacted:  Reports no recent changes, than instructed.  Will maintain current dose of Warfarin 2.5 mg x 6 days per week and none on Wednesday.  Recheck on 1/24/2019 ([facility]).  Patient repeated back instructions and voiced understanding.

## 2019-01-21 ENCOUNTER — LAB VISIT (OUTPATIENT)
Dept: LAB | Facility: HOSPITAL | Age: 84
End: 2019-01-21
Attending: FAMILY MEDICINE
Payer: MEDICARE

## 2019-01-21 ENCOUNTER — PATIENT MESSAGE (OUTPATIENT)
Dept: FAMILY MEDICINE | Facility: CLINIC | Age: 84
End: 2019-01-21

## 2019-01-21 ENCOUNTER — OFFICE VISIT (OUTPATIENT)
Dept: FAMILY MEDICINE | Facility: CLINIC | Age: 84
End: 2019-01-21
Attending: FAMILY MEDICINE
Payer: MEDICARE

## 2019-01-21 VITALS
BODY MASS INDEX: 34.37 KG/M2 | WEIGHT: 186.75 LBS | TEMPERATURE: 97 F | HEART RATE: 87 BPM | DIASTOLIC BLOOD PRESSURE: 74 MMHG | SYSTOLIC BLOOD PRESSURE: 118 MMHG | HEIGHT: 62 IN | OXYGEN SATURATION: 98 %

## 2019-01-21 DIAGNOSIS — I50.32 CHRONIC DIASTOLIC CONGESTIVE HEART FAILURE: ICD-10-CM

## 2019-01-21 DIAGNOSIS — R60.0 LEG EDEMA, LEFT: ICD-10-CM

## 2019-01-21 DIAGNOSIS — R60.0 LEG EDEMA, LEFT: Primary | ICD-10-CM

## 2019-01-21 DIAGNOSIS — I21.4 NSTEMI (NON-ST ELEVATED MYOCARDIAL INFARCTION): ICD-10-CM

## 2019-01-21 DIAGNOSIS — L03.116 LEFT LEG CELLULITIS: ICD-10-CM

## 2019-01-21 DIAGNOSIS — I48.21 PERMANENT ATRIAL FIBRILLATION: ICD-10-CM

## 2019-01-21 DIAGNOSIS — F03.91 DEMENTIA WITH BEHAVIORAL DISTURBANCE, UNSPECIFIED DEMENTIA TYPE: ICD-10-CM

## 2019-01-21 LAB
ALBUMIN SERPL BCP-MCNC: 3.5 G/DL
ALP SERPL-CCNC: 160 U/L
ALT SERPL W/O P-5'-P-CCNC: 20 U/L
ANION GAP SERPL CALC-SCNC: 12 MMOL/L
AST SERPL-CCNC: 23 U/L
BASOPHILS # BLD AUTO: 0.07 K/UL
BASOPHILS NFR BLD: 0.8 %
BILIRUB SERPL-MCNC: 1.7 MG/DL
BNP SERPL-MCNC: 332 PG/ML
BUN SERPL-MCNC: 13 MG/DL
CALCIUM SERPL-MCNC: 9.6 MG/DL
CHLORIDE SERPL-SCNC: 103 MMOL/L
CO2 SERPL-SCNC: 27 MMOL/L
CREAT SERPL-MCNC: 0.7 MG/DL
CRP SERPL-MCNC: 2.2 MG/L
DIFFERENTIAL METHOD: NORMAL
EOSINOPHIL # BLD AUTO: 0.1 K/UL
EOSINOPHIL NFR BLD: 1.4 %
ERYTHROCYTE [DISTWIDTH] IN BLOOD BY AUTOMATED COUNT: 13.2 %
EST. GFR  (AFRICAN AMERICAN): >60 ML/MIN/1.73 M^2
EST. GFR  (NON AFRICAN AMERICAN): >60 ML/MIN/1.73 M^2
GLUCOSE SERPL-MCNC: 97 MG/DL
HCT VFR BLD AUTO: 47.8 %
HGB BLD-MCNC: 15.5 G/DL
IMM GRANULOCYTES # BLD AUTO: 0.02 K/UL
IMM GRANULOCYTES NFR BLD AUTO: 0.2 %
LYMPHOCYTES # BLD AUTO: 2.1 K/UL
LYMPHOCYTES NFR BLD: 23.8 %
MCH RBC QN AUTO: 29.9 PG
MCHC RBC AUTO-ENTMCNC: 32.4 G/DL
MCV RBC AUTO: 92 FL
MONOCYTES # BLD AUTO: 0.8 K/UL
MONOCYTES NFR BLD: 9.6 %
NEUTROPHILS # BLD AUTO: 5.6 K/UL
NEUTROPHILS NFR BLD: 64.2 %
NRBC BLD-RTO: 0 /100 WBC
PLATELET # BLD AUTO: 214 K/UL
PMV BLD AUTO: 11.5 FL
POTASSIUM SERPL-SCNC: 3.9 MMOL/L
PROT SERPL-MCNC: 6.9 G/DL
RBC # BLD AUTO: 5.19 M/UL
SODIUM SERPL-SCNC: 142 MMOL/L
WBC # BLD AUTO: 8.65 K/UL

## 2019-01-21 PROCEDURE — 99214 OFFICE O/P EST MOD 30 MIN: CPT | Mod: HCNC,S$GLB,, | Performed by: FAMILY MEDICINE

## 2019-01-21 PROCEDURE — 99999 PR PBB SHADOW E&M-EST. PATIENT-LVL IV: ICD-10-PCS | Mod: PBBFAC,HCNC,, | Performed by: FAMILY MEDICINE

## 2019-01-21 PROCEDURE — 3074F PR MOST RECENT SYSTOLIC BLOOD PRESSURE < 130 MM HG: ICD-10-PCS | Mod: CPTII,HCNC,S$GLB, | Performed by: FAMILY MEDICINE

## 2019-01-21 PROCEDURE — 3078F DIAST BP <80 MM HG: CPT | Mod: CPTII,HCNC,S$GLB, | Performed by: FAMILY MEDICINE

## 2019-01-21 PROCEDURE — 1101F PR PT FALLS ASSESS DOC 0-1 FALLS W/OUT INJ PAST YR: ICD-10-PCS | Mod: CPTII,HCNC,S$GLB, | Performed by: FAMILY MEDICINE

## 2019-01-21 PROCEDURE — 99999 PR PBB SHADOW E&M-EST. PATIENT-LVL IV: CPT | Mod: PBBFAC,HCNC,, | Performed by: FAMILY MEDICINE

## 2019-01-21 PROCEDURE — 3078F PR MOST RECENT DIASTOLIC BLOOD PRESSURE < 80 MM HG: ICD-10-PCS | Mod: CPTII,HCNC,S$GLB, | Performed by: FAMILY MEDICINE

## 2019-01-21 PROCEDURE — 99499 RISK ADDL DX/OHS AUDIT: ICD-10-PCS | Mod: HCNC,S$GLB,, | Performed by: FAMILY MEDICINE

## 2019-01-21 PROCEDURE — 1101F PT FALLS ASSESS-DOCD LE1/YR: CPT | Mod: CPTII,HCNC,S$GLB, | Performed by: FAMILY MEDICINE

## 2019-01-21 PROCEDURE — 86140 C-REACTIVE PROTEIN: CPT | Mod: HCNC

## 2019-01-21 PROCEDURE — 99499 UNLISTED E&M SERVICE: CPT | Mod: HCNC,S$GLB,, | Performed by: FAMILY MEDICINE

## 2019-01-21 PROCEDURE — 83880 ASSAY OF NATRIURETIC PEPTIDE: CPT | Mod: HCNC

## 2019-01-21 PROCEDURE — 3074F SYST BP LT 130 MM HG: CPT | Mod: CPTII,HCNC,S$GLB, | Performed by: FAMILY MEDICINE

## 2019-01-21 PROCEDURE — 85025 COMPLETE CBC W/AUTO DIFF WBC: CPT | Mod: HCNC

## 2019-01-21 PROCEDURE — 80053 COMPREHEN METABOLIC PANEL: CPT | Mod: HCNC

## 2019-01-21 PROCEDURE — 36415 COLL VENOUS BLD VENIPUNCTURE: CPT | Mod: HCNC,PO

## 2019-01-21 PROCEDURE — 99214 PR OFFICE/OUTPT VISIT, EST, LEVL IV, 30-39 MIN: ICD-10-PCS | Mod: HCNC,S$GLB,, | Performed by: FAMILY MEDICINE

## 2019-01-21 RX ORDER — TRIAMCINOLONE ACETONIDE 1 MG/G
CREAM TOPICAL 2 TIMES DAILY
Qty: 80 G | Refills: 0 | Status: SHIPPED | OUTPATIENT
Start: 2019-01-21

## 2019-01-21 RX ORDER — CLINDAMYCIN HYDROCHLORIDE 300 MG/1
300 CAPSULE ORAL 3 TIMES DAILY
Qty: 21 CAPSULE | Refills: 0 | Status: SHIPPED | OUTPATIENT
Start: 2019-01-21 | End: 2019-04-10

## 2019-01-21 NOTE — PROGRESS NOTES
Subjective:       Patient ID: Delores M Cryer is a 83 y.o. female.    Chief Complaint: Leg Swelling (with redness to left leg)    83 y old female with A fib , Hx of CAD , dementia , Diastolic chf with LLE edema and erythema for aprox  7  days . She cant recall any injuries. No pain , no fever . Caregiver noticed a scab on posterior aspect of LLE ( lower 1/3 ) 3 days go       Review of Systems   Constitutional: Negative.    HENT: Negative.    Eyes: Negative.    Respiratory: Negative.    Cardiovascular: Positive for leg swelling.   Gastrointestinal: Negative.    Genitourinary: Negative.    Musculoskeletal: Negative.    Skin: Negative.    Hematological: Negative.        Objective:      Physical Exam   Constitutional: She is oriented to person, place, and time. She appears well-developed and well-nourished. No distress.   HENT:   Head: Normocephalic and atraumatic.   Right Ear: External ear normal.   Left Ear: External ear normal.   Mouth/Throat: No oropharyngeal exudate.   Eyes: Conjunctivae and EOM are normal. Pupils are equal, round, and reactive to light. Right eye exhibits no discharge. Left eye exhibits no discharge. No scleral icterus.   Neck: Normal range of motion. Neck supple. No JVD present. No tracheal deviation present. No thyromegaly present.   Cardiovascular: Normal rate, regular rhythm and normal heart sounds. Exam reveals no gallop and no friction rub.   No murmur heard.  Pulmonary/Chest: Effort normal and breath sounds normal. No stridor. No respiratory distress. She has no wheezes. She has no rales. She exhibits no tenderness.   Abdominal: Soft. Bowel sounds are normal. She exhibits no distension. There is no tenderness. There is no rebound and no guarding.   Musculoskeletal: Normal range of motion.        Left lower leg: She exhibits swelling and edema.        Legs:  Lymphadenopathy:     She has no cervical adenopathy.   Neurological: She is alert and oriented to person, place, and time.   Skin: Skin  is warm and dry. Laceration noted. She is not diaphoretic.        Psychiatric: She has a normal mood and affect. Her behavior is normal. Judgment and thought content normal.       Assessment:         Tania was seen today for leg swelling.    Diagnoses and all orders for this visit:    Leg edema, left  -     CBC auto differential; Future  -     C-reactive protein; Future  -     Comprehensive metabolic panel; Future  -     Brain natriuretic peptide; Future    Chronic diastolic congestive heart failure    Permanent atrial fibrillation    NSTEMI (non-ST elevated myocardial infarction)    Dementia with behavioral disturbance, unspecified dementia type    Left leg cellulitis    Other orders  -     triamcinolone acetonide 0.1% (KENALOG) 0.1 % cream; Apply topically 2 (two) times daily.  -     clindamycin (CLEOCIN) 300 MG capsule; Take 1 capsule (300 mg total) by mouth 3 (three) times daily.      Plan:     Tania was seen today for leg swelling.    Diagnoses and all orders for this visit:    Leg edema, left  -     CBC auto differential; Future  -     C-reactive protein; Future  -     Comprehensive metabolic panel; Future  -     Brain natriuretic peptide; Future    Chronic diastolic congestive heart failure    Permanent atrial fibrillation    NSTEMI (non-ST elevated myocardial infarction)    Dementia with behavioral disturbance, unspecified dementia type    Other orders  -     triamcinolone acetonide 0.1% (KENALOG) 0.1 % cream; Apply topically 2 (two) times daily.  -     clindamycin (CLEOCIN) 300 MG capsule; Take 1 capsule (300 mg total) by mouth 3 (three) times daily.     Keep leg elevated  Labs   WS discussed  Asked to notify coumadin clinic about current  ABx  Script   F.u in 2 d   See media tab for images

## 2019-01-23 ENCOUNTER — PATIENT OUTREACH (OUTPATIENT)
Dept: ADMINISTRATIVE | Facility: HOSPITAL | Age: 84
End: 2019-01-23

## 2019-01-23 ENCOUNTER — OFFICE VISIT (OUTPATIENT)
Dept: FAMILY MEDICINE | Facility: CLINIC | Age: 84
End: 2019-01-23
Payer: MEDICARE

## 2019-01-23 VITALS
SYSTOLIC BLOOD PRESSURE: 127 MMHG | WEIGHT: 190.25 LBS | OXYGEN SATURATION: 97 % | BODY MASS INDEX: 34.8 KG/M2 | DIASTOLIC BLOOD PRESSURE: 79 MMHG | HEART RATE: 81 BPM | TEMPERATURE: 98 F

## 2019-01-23 DIAGNOSIS — I70.0 AORTIC ATHEROSCLEROSIS: ICD-10-CM

## 2019-01-23 DIAGNOSIS — I51.89 DIASTOLIC DYSFUNCTION: ICD-10-CM

## 2019-01-23 DIAGNOSIS — L03.116 LEFT LEG CELLULITIS: Primary | ICD-10-CM

## 2019-01-23 PROCEDURE — 3074F SYST BP LT 130 MM HG: CPT | Mod: CPTII,S$GLB,, | Performed by: NURSE PRACTITIONER

## 2019-01-23 PROCEDURE — 1101F PT FALLS ASSESS-DOCD LE1/YR: CPT | Mod: CPTII,S$GLB,, | Performed by: NURSE PRACTITIONER

## 2019-01-23 PROCEDURE — 99999 PR PBB SHADOW E&M-EST. PATIENT-LVL III: ICD-10-PCS | Mod: PBBFAC,,, | Performed by: NURSE PRACTITIONER

## 2019-01-23 PROCEDURE — 99213 OFFICE O/P EST LOW 20 MIN: CPT | Mod: S$GLB,,, | Performed by: NURSE PRACTITIONER

## 2019-01-23 PROCEDURE — 3078F PR MOST RECENT DIASTOLIC BLOOD PRESSURE < 80 MM HG: ICD-10-PCS | Mod: CPTII,S$GLB,, | Performed by: NURSE PRACTITIONER

## 2019-01-23 PROCEDURE — 1101F PR PT FALLS ASSESS DOC 0-1 FALLS W/OUT INJ PAST YR: ICD-10-PCS | Mod: CPTII,S$GLB,, | Performed by: NURSE PRACTITIONER

## 2019-01-23 PROCEDURE — 99999 PR PBB SHADOW E&M-EST. PATIENT-LVL III: CPT | Mod: PBBFAC,,, | Performed by: NURSE PRACTITIONER

## 2019-01-23 PROCEDURE — 3078F DIAST BP <80 MM HG: CPT | Mod: CPTII,S$GLB,, | Performed by: NURSE PRACTITIONER

## 2019-01-23 PROCEDURE — 99499 UNLISTED E&M SERVICE: CPT | Mod: HCNC,S$GLB,, | Performed by: NURSE PRACTITIONER

## 2019-01-23 PROCEDURE — 99213 PR OFFICE/OUTPT VISIT, EST, LEVL III, 20-29 MIN: ICD-10-PCS | Mod: S$GLB,,, | Performed by: NURSE PRACTITIONER

## 2019-01-23 PROCEDURE — 3074F PR MOST RECENT SYSTOLIC BLOOD PRESSURE < 130 MM HG: ICD-10-PCS | Mod: CPTII,S$GLB,, | Performed by: NURSE PRACTITIONER

## 2019-01-23 PROCEDURE — 99499 RISK ADDL DX/OHS AUDIT: ICD-10-PCS | Mod: HCNC,S$GLB,, | Performed by: NURSE PRACTITIONER

## 2019-01-23 NOTE — PROGRESS NOTES
Subjective:       Patient ID: Delores M Cryer is a 83 y.o. female.    Chief Complaint: Follow-up  pt reports to clinic for follow up evaluation of left leg cellulitis and edema. Treated 2 days ago with clindamycin.  Pt denies worsening edema or erythema.  Notes mild pain.  Review of labs shows AAP=642.  Pt only takes lasix on PRN basis.  PMH of afib, diastolic dyfunction, CAD, HTN.  Denies SOB or cough. 3lb gain in 2 days.   Last ECHO 7/12/2017 EF=60%  HPI  Review of Systems   Respiratory: Negative for cough, shortness of breath and wheezing.    Cardiovascular: Positive for leg swelling.   Musculoskeletal: Positive for arthralgias.   Skin: Positive for wound.       Objective:      Physical Exam   Constitutional: She is oriented to person, place, and time. She appears well-developed and well-nourished.   Cardiovascular: Normal rate. An irregular rhythm present.   Pulmonary/Chest: Effort normal and breath sounds normal.   Neurological: She is alert and oriented to person, place, and time.   Skin: Skin is warm. There is erythema.        Vitals reviewed.      Assessment:       1. Left leg cellulitis    2. Aortic atherosclerosis    3. Diastolic dysfunction        Plan:   Left leg cellulitis  Improving. Continue clindamycin  Aortic atherosclerosis  Continue statin therapy and bp control  Diastolic dysfunction  -resume lasix 20mg/day follow up in 1 week will repeat BNP    Follow-up in about 7 days (around 1/30/2019).

## 2019-01-24 ENCOUNTER — TELEPHONE (OUTPATIENT)
Dept: CARDIOLOGY | Facility: CLINIC | Age: 84
End: 2019-01-24

## 2019-01-24 ENCOUNTER — ANTI-COAG VISIT (OUTPATIENT)
Dept: CARDIOLOGY | Facility: CLINIC | Age: 84
End: 2019-01-24

## 2019-01-24 ENCOUNTER — LAB VISIT (OUTPATIENT)
Dept: LAB | Facility: HOSPITAL | Age: 84
End: 2019-01-24
Attending: INTERNAL MEDICINE
Payer: MEDICARE

## 2019-01-24 DIAGNOSIS — Z79.01 LONG TERM (CURRENT) USE OF ANTICOAGULANTS: ICD-10-CM

## 2019-01-24 LAB
INR PPP: 2.1
PROTHROMBIN TIME: 21.9 SEC

## 2019-01-24 PROCEDURE — 36415 COLL VENOUS BLD VENIPUNCTURE: CPT | Mod: PO

## 2019-01-24 PROCEDURE — 85610 PROTHROMBIN TIME: CPT

## 2019-01-24 NOTE — TELEPHONE ENCOUNTER
----- Message from Do Galaviz sent at 1/24/2019  2:06 PM CST -----  Contact: madelynther-in-law([facility])-[phone]  Would like to consult with nurse regarding lab results, please call back at [phone]. Thanks/ar

## 2019-01-24 NOTE — TELEPHONE ENCOUNTER
Returned call to January, patient's daughter-in-law, states would like to get results of patient's INR and orders--informed that [facility] will call with results and orders--states her number is not listed because she only prepare medications for patient-other than that her number is not needed--would like to have [facility] give her a call back at [phone]

## 2019-01-24 NOTE — PROGRESS NOTES
Patient's INR is therapeutic at 2.1.  Contacted Ms. Sin (daughter) at [phone] with results.  Reports patient started Clindamycin (CLEOCIN) 300 MG capsule on 1/21/2019 for 7 days.  Will maintain current dose of Warfarin 0 mg every Wednesday and 2.5 mg on all other days per week.  Recheck on 1/30/2019 ([facility]).  Ms. Sin repeated back instructions and voiced understanding.

## 2019-01-29 RX ORDER — METOPROLOL SUCCINATE 100 MG/1
TABLET, EXTENDED RELEASE ORAL
Qty: 180 TABLET | Refills: 0 | Status: SHIPPED | OUTPATIENT
Start: 2019-01-29 | End: 2019-04-29 | Stop reason: SDUPTHER

## 2019-01-30 ENCOUNTER — LAB VISIT (OUTPATIENT)
Dept: LAB | Facility: HOSPITAL | Age: 84
End: 2019-01-30
Attending: NURSE PRACTITIONER
Payer: MEDICARE

## 2019-01-30 ENCOUNTER — OFFICE VISIT (OUTPATIENT)
Dept: FAMILY MEDICINE | Facility: CLINIC | Age: 84
End: 2019-01-30
Payer: MEDICARE

## 2019-01-30 VITALS
SYSTOLIC BLOOD PRESSURE: 144 MMHG | DIASTOLIC BLOOD PRESSURE: 81 MMHG | TEMPERATURE: 98 F | OXYGEN SATURATION: 97 % | BODY MASS INDEX: 34.03 KG/M2 | WEIGHT: 186.06 LBS | HEART RATE: 97 BPM

## 2019-01-30 DIAGNOSIS — R60.0 PERIPHERAL EDEMA: ICD-10-CM

## 2019-01-30 DIAGNOSIS — L03.116 CELLULITIS OF LEG, LEFT: Primary | ICD-10-CM

## 2019-01-30 DIAGNOSIS — Z79.01 LONG TERM (CURRENT) USE OF ANTICOAGULANTS: ICD-10-CM

## 2019-01-30 LAB
INR PPP: 2.3
PROTHROMBIN TIME: 22.5 SEC

## 2019-01-30 PROCEDURE — 99999 PR PBB SHADOW E&M-EST. PATIENT-LVL IV: CPT | Mod: PBBFAC,HCNC,, | Performed by: NURSE PRACTITIONER

## 2019-01-30 PROCEDURE — 1101F PT FALLS ASSESS-DOCD LE1/YR: CPT | Mod: HCNC,CPTII,S$GLB, | Performed by: NURSE PRACTITIONER

## 2019-01-30 PROCEDURE — 99999 PR PBB SHADOW E&M-EST. PATIENT-LVL IV: ICD-10-PCS | Mod: PBBFAC,HCNC,, | Performed by: NURSE PRACTITIONER

## 2019-01-30 PROCEDURE — 3079F PR MOST RECENT DIASTOLIC BLOOD PRESSURE 80-89 MM HG: ICD-10-PCS | Mod: HCNC,CPTII,S$GLB, | Performed by: NURSE PRACTITIONER

## 2019-01-30 PROCEDURE — 85610 PROTHROMBIN TIME: CPT | Mod: HCNC

## 2019-01-30 PROCEDURE — 3077F PR MOST RECENT SYSTOLIC BLOOD PRESSURE >= 140 MM HG: ICD-10-PCS | Mod: HCNC,CPTII,S$GLB, | Performed by: NURSE PRACTITIONER

## 2019-01-30 PROCEDURE — 99212 PR OFFICE/OUTPT VISIT, EST, LEVL II, 10-19 MIN: ICD-10-PCS | Mod: HCNC,S$GLB,, | Performed by: NURSE PRACTITIONER

## 2019-01-30 PROCEDURE — 3077F SYST BP >= 140 MM HG: CPT | Mod: HCNC,CPTII,S$GLB, | Performed by: NURSE PRACTITIONER

## 2019-01-30 PROCEDURE — 1101F PR PT FALLS ASSESS DOC 0-1 FALLS W/OUT INJ PAST YR: ICD-10-PCS | Mod: HCNC,CPTII,S$GLB, | Performed by: NURSE PRACTITIONER

## 2019-01-30 PROCEDURE — 99212 OFFICE O/P EST SF 10 MIN: CPT | Mod: HCNC,S$GLB,, | Performed by: NURSE PRACTITIONER

## 2019-01-30 PROCEDURE — 3079F DIAST BP 80-89 MM HG: CPT | Mod: HCNC,CPTII,S$GLB, | Performed by: NURSE PRACTITIONER

## 2019-01-30 NOTE — PROGRESS NOTES
Subjective:       Patient ID: Delores M Cryer is a 83 y.o. female.    Chief Complaint: Follow-up  pt reports to clinic for follow up evaluation of cellulitis to left lower extremity. Has been treated with clindamycin.  Reports improvement in symptoms.  Decreased pain and erythema.  pt placed on lasix 20mg/day. Down 4lbs since last visit  Will re evaluate labs. Follow up appt has been scheduled with cardiology  HPI  Review of Systems   Constitutional: Negative.  Negative for fever.   Respiratory: Negative.    Cardiovascular: Negative.    Musculoskeletal: Positive for arthralgias and myalgias.   Skin: Positive for color change.       Objective:      Physical Exam   Constitutional: She is oriented to person, place, and time. She appears well-developed and well-nourished.   Cardiovascular: An irregularly irregular rhythm present.   Pulmonary/Chest: Effort normal and breath sounds normal.   Musculoskeletal: She exhibits edema (1+ pitting ).   Neurological: She is alert and oriented to person, place, and time.   Skin:        Vitals reviewed.      Assessment:       1. Cellulitis of leg, left    2. Peripheral edema        Plan:   Cellulitis of leg, left  resolved  Peripheral edema  -     Brain natriuretic peptide; Future; Expected date: 01/30/2019  -     Comprehensive metabolic panel; Future; Expected date: 01/30/2019  Improved. Continue treatment plan until cardiology eval    No Follow-up on file.

## 2019-01-31 ENCOUNTER — ANTI-COAG VISIT (OUTPATIENT)
Dept: CARDIOLOGY | Facility: CLINIC | Age: 84
End: 2019-01-31

## 2019-01-31 NOTE — PROGRESS NOTES
Patient's INR is therapeutic at 2.3.  Ms. Sin (caregiver) contacted:  Reports Clindamycin completed.  Will maintain current dose of Warfarin 0 mg every Wednesday and 5 mg on all other days per week.  Recheck on 2/20/2019 ([facility]).  Ms. Sin voiced understanding.

## 2019-02-20 ENCOUNTER — LAB VISIT (OUTPATIENT)
Dept: LAB | Facility: HOSPITAL | Age: 84
End: 2019-02-20
Attending: INTERNAL MEDICINE
Payer: MEDICARE

## 2019-02-20 ENCOUNTER — ANTI-COAG VISIT (OUTPATIENT)
Dept: CARDIOLOGY | Facility: CLINIC | Age: 84
End: 2019-02-20

## 2019-02-20 DIAGNOSIS — Z79.01 LONG TERM (CURRENT) USE OF ANTICOAGULANTS: ICD-10-CM

## 2019-02-20 LAB
INR PPP: 2.7
PROTHROMBIN TIME: 28.1 SEC

## 2019-02-20 PROCEDURE — 85610 PROTHROMBIN TIME: CPT | Mod: HCNC

## 2019-02-20 PROCEDURE — 36415 COLL VENOUS BLD VENIPUNCTURE: CPT | Mod: HCNC,PO

## 2019-02-20 NOTE — PROGRESS NOTES
Patient's INR is therapeutic at 2.7.  Left V/M for Ms. Sin (daughter/caregiver) to maintain current dose of Warfarin 0 every Wednesday and 2.5 mg on all other days.  Recheck on 3/20/2019 ([facility]).  Any questions or concerns, contact the [facility] at [phone].

## 2019-03-04 RX ORDER — DONEPEZIL HYDROCHLORIDE 5 MG/1
5 TABLET, FILM COATED ORAL NIGHTLY
Qty: 90 TABLET | Refills: 0 | Status: SHIPPED | OUTPATIENT
Start: 2019-03-04 | End: 2019-06-03 | Stop reason: SDUPTHER

## 2019-03-14 ENCOUNTER — PES CALL (OUTPATIENT)
Dept: ADMINISTRATIVE | Facility: CLINIC | Age: 84
End: 2019-03-14

## 2019-03-18 RX ORDER — CITALOPRAM 20 MG/1
20 TABLET, FILM COATED ORAL DAILY
Qty: 90 TABLET | Refills: 0 | Status: SHIPPED | OUTPATIENT
Start: 2019-03-18 | End: 2019-06-14 | Stop reason: SDUPTHER

## 2019-03-18 RX ORDER — ATORVASTATIN CALCIUM 40 MG/1
TABLET, FILM COATED ORAL
Qty: 90 TABLET | Refills: 0 | Status: SHIPPED | OUTPATIENT
Start: 2019-03-18 | End: 2019-06-24 | Stop reason: SDUPTHER

## 2019-03-20 ENCOUNTER — ANTI-COAG VISIT (OUTPATIENT)
Dept: CARDIOLOGY | Facility: CLINIC | Age: 84
End: 2019-03-20

## 2019-03-20 ENCOUNTER — LAB VISIT (OUTPATIENT)
Dept: LAB | Facility: HOSPITAL | Age: 84
End: 2019-03-20
Attending: INTERNAL MEDICINE
Payer: MEDICARE

## 2019-03-20 DIAGNOSIS — Z79.01 LONG TERM (CURRENT) USE OF ANTICOAGULANTS: ICD-10-CM

## 2019-03-20 LAB
INR PPP: 3.1
PROTHROMBIN TIME: 32 SEC

## 2019-03-20 PROCEDURE — 85610 PROTHROMBIN TIME: CPT | Mod: HCNC

## 2019-03-20 PROCEDURE — 36415 COLL VENOUS BLD VENIPUNCTURE: CPT | Mod: HCNC,PO

## 2019-03-20 NOTE — PROGRESS NOTES
Ms. Sin (caregiver) contacted:  INR is slightly elevated from goal at 3.1.  Reports patient had taken an extra 2.5 mg (1/2 tablet) on Saturday, 3/16/2019 by error.  No signs of any abnormal bleeding reported.  Instructed to have patient to eat a small portion of a dark leafy vegetable today, then resume on regular diet.  Maintain current dose of Warfarin 0 mg every Wednesday and 2.5 mg on all other days per week.  Recheck on 4/10/2019 ([facility]).  Ms. Sin repeated back instructions and voiced understanding.

## 2019-04-01 ENCOUNTER — HOSPITAL ENCOUNTER (EMERGENCY)
Facility: HOSPITAL | Age: 84
Discharge: HOME | End: 2019-04-01
Attending: EMERGENCY MEDICINE
Payer: MEDICARE

## 2019-04-01 VITALS
SYSTOLIC BLOOD PRESSURE: 150 MMHG | HEART RATE: 100 BPM | TEMPERATURE: 98 F | DIASTOLIC BLOOD PRESSURE: 70 MMHG | RESPIRATION RATE: 20 BRPM | OXYGEN SATURATION: 96 % | BODY MASS INDEX: 30.23 KG/M2 | HEIGHT: 66 IN | WEIGHT: 188.13 LBS

## 2019-04-01 DIAGNOSIS — R07.9 CHEST PAIN: ICD-10-CM

## 2019-04-01 DIAGNOSIS — I50.9 CONGESTIVE HEART FAILURE, UNSPECIFIED HF CHRONICITY, UNSPECIFIED HEART FAILURE TYPE: Primary | ICD-10-CM

## 2019-04-01 LAB
ALBUMIN SERPL BCP-MCNC: 3.3 G/DL (ref 3.5–5.2)
ALP SERPL-CCNC: 130 U/L (ref 55–135)
ALT SERPL W/O P-5'-P-CCNC: 17 U/L (ref 10–44)
ANION GAP SERPL CALC-SCNC: 9 MMOL/L (ref 8–16)
AST SERPL-CCNC: 20 U/L (ref 10–40)
BASOPHILS # BLD AUTO: 0.02 K/UL (ref 0–0.2)
BASOPHILS NFR BLD: 0.2 % (ref 0–1.9)
BILIRUB SERPL-MCNC: 1 MG/DL (ref 0.1–1)
BNP SERPL-MCNC: 400 PG/ML (ref 0–99)
BUN SERPL-MCNC: 17 MG/DL (ref 8–23)
CALCIUM SERPL-MCNC: 9.1 MG/DL (ref 8.7–10.5)
CHLORIDE SERPL-SCNC: 108 MMOL/L (ref 95–110)
CO2 SERPL-SCNC: 22 MMOL/L (ref 23–29)
CREAT SERPL-MCNC: 0.7 MG/DL (ref 0.5–1.4)
DIFFERENTIAL METHOD: ABNORMAL
EOSINOPHIL # BLD AUTO: 0.2 K/UL (ref 0–0.5)
EOSINOPHIL NFR BLD: 2 % (ref 0–8)
ERYTHROCYTE [DISTWIDTH] IN BLOOD BY AUTOMATED COUNT: 13.3 % (ref 11.5–14.5)
EST. GFR  (AFRICAN AMERICAN): >60 ML/MIN/1.73 M^2
EST. GFR  (NON AFRICAN AMERICAN): >60 ML/MIN/1.73 M^2
GLUCOSE SERPL-MCNC: 93 MG/DL (ref 70–110)
HCT VFR BLD AUTO: 43.2 % (ref 37–48.5)
HGB BLD-MCNC: 14.4 G/DL (ref 12–16)
LYMPHOCYTES # BLD AUTO: 2.2 K/UL (ref 1–4.8)
LYMPHOCYTES NFR BLD: 20.1 % (ref 18–48)
MCH RBC QN AUTO: 30.3 PG (ref 27–31)
MCHC RBC AUTO-ENTMCNC: 33.3 G/DL (ref 32–36)
MCV RBC AUTO: 91 FL (ref 82–98)
MONOCYTES # BLD AUTO: 1.3 K/UL (ref 0.3–1)
MONOCYTES NFR BLD: 11.7 % (ref 4–15)
NEUTROPHILS # BLD AUTO: 7.1 K/UL (ref 1.8–7.7)
NEUTROPHILS NFR BLD: 66 % (ref 38–73)
PLATELET # BLD AUTO: 174 K/UL (ref 150–350)
PMV BLD AUTO: 10.9 FL (ref 9.2–12.9)
POTASSIUM SERPL-SCNC: 3.9 MMOL/L (ref 3.5–5.1)
PROT SERPL-MCNC: 6.3 G/DL (ref 6–8.4)
RBC # BLD AUTO: 4.76 M/UL (ref 4–5.4)
SODIUM SERPL-SCNC: 139 MMOL/L (ref 136–145)
TROPONIN I SERPL DL<=0.01 NG/ML-MCNC: 0.01 NG/ML (ref 0–0.03)
TROPONIN I SERPL DL<=0.01 NG/ML-MCNC: <0.006 NG/ML (ref 0–0.03)
WBC # BLD AUTO: 10.82 K/UL (ref 3.9–12.7)

## 2019-04-01 PROCEDURE — 25000003 PHARM REV CODE 250: Mod: HCNC | Performed by: EMERGENCY MEDICINE

## 2019-04-01 PROCEDURE — 63600175 PHARM REV CODE 636 W HCPCS: Mod: HCNC | Performed by: EMERGENCY MEDICINE

## 2019-04-01 PROCEDURE — 84484 ASSAY OF TROPONIN QUANT: CPT | Mod: HCNC

## 2019-04-01 PROCEDURE — 85025 COMPLETE CBC W/AUTO DIFF WBC: CPT | Mod: HCNC

## 2019-04-01 PROCEDURE — 36415 COLL VENOUS BLD VENIPUNCTURE: CPT | Mod: HCNC

## 2019-04-01 PROCEDURE — 83880 ASSAY OF NATRIURETIC PEPTIDE: CPT | Mod: HCNC

## 2019-04-01 PROCEDURE — 93010 ELECTROCARDIOGRAM REPORT: CPT | Mod: HCNC,,, | Performed by: INTERNAL MEDICINE

## 2019-04-01 PROCEDURE — 93005 ELECTROCARDIOGRAM TRACING: CPT | Mod: HCNC

## 2019-04-01 PROCEDURE — 96374 THER/PROPH/DIAG INJ IV PUSH: CPT | Mod: HCNC

## 2019-04-01 PROCEDURE — 99285 EMERGENCY DEPT VISIT HI MDM: CPT | Mod: 25,HCNC

## 2019-04-01 PROCEDURE — 93010 EKG 12-LEAD: ICD-10-PCS | Mod: HCNC,,, | Performed by: INTERNAL MEDICINE

## 2019-04-01 PROCEDURE — 80053 COMPREHEN METABOLIC PANEL: CPT | Mod: HCNC

## 2019-04-01 RX ORDER — FUROSEMIDE 10 MG/ML
20 INJECTION INTRAMUSCULAR; INTRAVENOUS
Status: COMPLETED | OUTPATIENT
Start: 2019-04-01 | End: 2019-04-01

## 2019-04-01 RX ORDER — ASPIRIN 325 MG
325 TABLET ORAL
Status: COMPLETED | OUTPATIENT
Start: 2019-04-01 | End: 2019-04-01

## 2019-04-01 RX ORDER — METOPROLOL TARTRATE 100 MG/1
100 TABLET ORAL
COMMUNITY
End: 2019-07-29 | Stop reason: SDUPTHER

## 2019-04-01 RX ORDER — FUROSEMIDE 20 MG/1
20 TABLET ORAL DAILY
Qty: 3 TABLET | Refills: 0 | Status: SHIPPED | OUTPATIENT
Start: 2019-04-01 | End: 2019-04-04

## 2019-04-01 RX ADMIN — FUROSEMIDE 20 MG: 10 INJECTION, SOLUTION INTRAMUSCULAR; INTRAVENOUS at 04:04

## 2019-04-01 RX ADMIN — ASPIRIN 325 MG ORAL TABLET 325 MG: 325 PILL ORAL at 01:04

## 2019-04-01 NOTE — ED PROVIDER NOTES
"SCRIBE #1 NOTE: I, Josselyn Yaakov, am scribing for, and in the presence of, Niranjan Dorantes Jr., MD. I have scribed the entire note.      History      Chief Complaint   Patient presents with    Chest Pain     L sided chest pain that resolved 30 minutes prior.       Review of patient's allergies indicates:   Allergen Reactions    Alendronate      Other reaction(s): Joint pain  Other reaction(s): Joint pain    Bacitracin     Nitrofurantoin macrocrystalline      Other reaction(s): Unknown    Sulfa (sulfonamide antibiotics)      Other reaction(s): Shortness of breath  Other reaction(s): Hives  Other reaction(s): Flushing (skin)  Other reaction(s): Edema  Other reaction(s): Shortness of breath  Other reaction(s): Hives  Other reaction(s): Flushing (skin)  Other reaction(s): Edema        HPI   HPI    4/1/2019, 12:46 AM   History obtained from the patient and family  HPI limited secondary to dementia       History of Present Illness: Delores M Cryer is a 83 y.o. female patient with a PMHx of ACS, Afib, CAD, HTN, NSTEMI who presents to the Emergency Department for R sided CP which onset gradually 3 hours PTA. Sxs were intermittent and moderate in severity. Sxs have completely resolved.  reports pt's pain lasted for 30 minutes-1 hour. No mitigating or exacerbating factors reported. Pt takes Eliquis. Pt reports she has had this pain before "awhile ago".       Arrival mode: Personal vehicle     PCP: Viviana Melchor MD       Past Medical History:  Past Medical History:   Diagnosis Date    Acute coronary syndrome     Atrial fibrillation     Congestive heart failure 4/1/2019    Coronary artery disease     Elevated cholesterol     Hypertension     Macular degeneration     Thyroid condition        Past Surgical History:  Past Surgical History:   Procedure Laterality Date    APPENDECTOMY      BACK SURGERY      x2    CARDIAC CATHETERIZATION  2016    CATARACT EXTRACTION      CHOLECYSTECTOMY      " CORONARY ANGIOPLASTY  2015    non-obstructive CAD    EYE SURGERY      HEART CATH-LEFT Left 2015    Performed by Camila Robb MD at [facility] CATH LAB    HIP SURGERY Bilateral     HYSTERECTOMY      KNEE SURGERY Bilateral     x 2 each         Family History:  Family History   Problem Relation Age of Onset    Cataracts Mother     Glaucoma Mother     Heart disease Mother     Diabetes Sister     Diabetes Maternal Grandmother     Hypertension Father        Social History:  Social History     Tobacco Use    Smoking status: Former Smoker     Packs/day: 1.00     Years: 30.00     Pack years: 30.00     Types: Cigarettes     Last attempt to quit: 1983     Years since quittin.2    Smokeless tobacco: Never Used   Substance and Sexual Activity    Alcohol use: No    Drug use: No    Sexual activity: unknown       ROS   Review of Systems   Unable to perform ROS: Dementia     Physical Exam      Initial Vitals [19 0031]   BP Pulse Resp Temp SpO2   (!) 177/79 65 16 97.5 °F (36.4 °C) 95 %      MAP       --          Physical Exam  Nursing Notes and Vital Signs Reviewed.  Constitutional: Patient is in no apparent distress. Well-developed and well-nourished.  Head: Atraumatic. Normocephalic.  Eyes: PERRL. EOM intact. Conjunctivae are not pale. No scleral icterus.  ENT: Mucous membranes are moist. Oropharynx is clear and symmetric.    Neck: Supple. Full ROM. No lymphadenopathy.  Cardiovascular: Regular rate. Irregularly irregular rhythm. No murmurs, rubs, or gallops. Distal pulses 2+.   Pulmonary/Chest: No respiratory distress. Crackles in lung bases bilaterally. R upper chest wall is TTP, palpation reproduces pt's complaint of pain.   Abdominal: Soft and non-distended.  There is no tenderness.  No rebound, guarding, or rigidity.  Musculoskeletal: Moves all extremities. No obvious deformities. No edema. No calf tenderness.  Skin: Warm and dry.  Neurological:  Awake and alert. At baseline mental status per  "family. Normal speech.  No acute focal neurological deficits are appreciated.    ED Course    Procedures  ED Vital Signs:  Vitals:    04/01/19 0031 04/01/19 0039 04/01/19 0049 04/01/19 0102   BP: (!) 177/79  (!) 173/74 (!) 170/70   Pulse: 65  71 66   Resp: 16 20 19   Temp: 97.5 °F (36.4 °C)      TempSrc: Oral      SpO2: 95%  96% 96%   Weight:  85.3 kg (188 lb 1.6 oz)     Height: 5' 6" (1.676 m)       04/01/19 0117 04/01/19 0159 04/01/19 0220 04/01/19 0307   BP: 138/62  (!) 154/72 138/82   Pulse: 68 70 65 69   Resp: 20  20 20   Temp:       TempSrc:       SpO2: 97%  96% 97%   Weight:       Height:        04/01/19 0402 04/01/19 0435   BP: 135/78 (!) 150/70   Pulse: 68 100   Resp: 20 20   Temp:  97.6 °F (36.4 °C)   TempSrc:  Oral   SpO2: 95% 96%   Weight:     Height:         Abnormal Lab Results:  Labs Reviewed   CBC W/ AUTO DIFFERENTIAL - Abnormal; Notable for the following components:       Result Value    Mono # 1.3 (*)     All other components within normal limits   COMPREHENSIVE METABOLIC PANEL - Abnormal; Notable for the following components:    CO2 22 (*)     Albumin 3.3 (*)     All other components within normal limits   B-TYPE NATRIURETIC PEPTIDE - Abnormal; Notable for the following components:     (*)     All other components within normal limits   TROPONIN I   TROPONIN I        All Lab Results:  Results for orders placed or performed during the hospital encounter of 04/01/19   CBC auto differential   Result Value Ref Range    WBC 10.82 3.90 - 12.70 K/uL    RBC 4.76 4.00 - 5.40 M/uL    Hemoglobin 14.4 12.0 - 16.0 g/dL    Hematocrit 43.2 37.0 - 48.5 %    MCV 91 82 - 98 fL    MCH 30.3 27.0 - 31.0 pg    MCHC 33.3 32.0 - 36.0 g/dL    RDW 13.3 11.5 - 14.5 %    Platelets 174 150 - 350 K/uL    MPV 10.9 9.2 - 12.9 fL    Gran # (ANC) 7.1 1.8 - 7.7 K/uL    Lymph # 2.2 1.0 - 4.8 K/uL    Mono # 1.3 (H) 0.3 - 1.0 K/uL    Eos # 0.2 0.0 - 0.5 K/uL    Baso # 0.02 0.00 - 0.20 K/uL    Gran% 66.0 38.0 - 73.0 %    Lymph% " 20.1 18.0 - 48.0 %    Mono% 11.7 4.0 - 15.0 %    Eosinophil% 2.0 0.0 - 8.0 %    Basophil% 0.2 0.0 - 1.9 %    Differential Method Automated    Comprehensive metabolic panel   Result Value Ref Range    Sodium 139 136 - 145 mmol/L    Potassium 3.9 3.5 - 5.1 mmol/L    Chloride 108 95 - 110 mmol/L    CO2 22 (L) 23 - 29 mmol/L    Glucose 93 70 - 110 mg/dL    BUN, Bld 17 8 - 23 mg/dL    Creatinine 0.7 0.5 - 1.4 mg/dL    Calcium 9.1 8.7 - 10.5 mg/dL    Total Protein 6.3 6.0 - 8.4 g/dL    Albumin 3.3 (L) 3.5 - 5.2 g/dL    Total Bilirubin 1.0 0.1 - 1.0 mg/dL    Alkaline Phosphatase 130 55 - 135 U/L    AST 20 10 - 40 U/L    ALT 17 10 - 44 U/L    Anion Gap 9 8 - 16 mmol/L    eGFR if African American >60 >60 mL/min/1.73 m^2    eGFR if non African American >60 >60 mL/min/1.73 m^2   Troponin I #1   Result Value Ref Range    Troponin I 0.007 0.000 - 0.026 ng/mL   B-Type natriuretic peptide (BNP)   Result Value Ref Range     (H) 0 - 99 pg/mL   Troponin I #2   Result Value Ref Range    Troponin I <0.006 0.000 - 0.026 ng/mL       Imaging Results:  Imaging Results          X-Ray Chest 1 View (Final result)  Result time 04/01/19 07:49:34    Final result by Kenney Mendez MD (04/01/19 07:49:34)                 Impression:      Fullness seen within the left suprahilar region. Recommend follow-up noncontrast chest CT.      Electronically signed by: Kenney Mendez MD  Date:    04/01/2019  Time:    07:49             Narrative:    EXAMINATION:  XR CHEST 1 VIEW    CLINICAL HISTORY:  Chest pain, unspecified    FINDINGS:  Single view of the chest.    Cardiac silhouette is enlarged but stable.  Aorta demonstrates atherosclerotic disease.  Fullness seen within the left suprahilar region.  Recommend follow-up noncontrast chest CT.  No evidence of pleural effusion or pneumothorax.  Bones demonstrate degenerative changes.                               The EKG was ordered, reviewed, and independently interpreted by the ED  provider.  Interpretation time: 0040  Rate: 74 BPM  Rhythm: atrial fibrillation  Interpretation: No acute ST changes. No STEMI.         The Emergency Provider reviewed the vital signs and test results, which are outlined above.    ED Discussion     4:33 AM: Reassessed pt at this time.  Pt remains CP free. Discussed with pt all pertinent ED information and results. Discussed pt dx and plan of tx.  Advised to double up on home lasix x 3 days and f/u c PCP/cardiology in 3 days as well. Gave pt all f/u and return to the ED instructions. All questions and concerns were addressed at this time. Pt expresses understanding of information and instructions, and is comfortable with plan to discharge. Pt is stable for discharge.    I have discussed with patient and/or family/caretaker chest pain precautions, specifically to return for worsening chest pain, shortness of breath, fever, or any concern.  I have low suspicion for cardiopulmonary, vascular, infectious, respiratory, or other emergent medical condition based on my evaluation in the ED.    ED Medication(s):  Medications   aspirin tablet 325 mg (325 mg Oral Given 4/1/19 0138)   furosemide injection 20 mg (20 mg Intravenous Given 4/1/19 0408)     Discharge Medication List as of 4/1/2019  4:32 AM      START taking these medications    Details   !! furosemide (LASIX) 20 MG tablet Take 1 tablet (20 mg total) by mouth once daily. for 3 days, Starting Mon 4/1/2019, Until Thu 4/4/2019, Print       !! - Potential duplicate medications found. Please discuss with provider.          Follow-up Information     Viviana Melchor MD. Schedule an appointment as soon as possible for a visit in 1 week.    Specialty:  Family Medicine  Contact information:  [address] [facility] [address]  [phone]             [facility] [place] - Cardiology. Schedule an appointment as soon as possible for a visit in 1 week.    Specialty:  Cardiology  Contact information:  [address]  [facility] [id number] [phone]  Additional information:  3rd Floor   From [address], take [address] to the [address]bound service [address]. Turn left at the first stop light onto [address]. The complex is NOT accessible from [address] [facility] - .    Specialty:  Emergency Medicine  Why:  As needed, If symptoms worsen  Contact information:  [address]  [facility] [address] [phone]                   Medical Decision Making    Medical Decision Making:   Clinical Tests:   Lab Tests: Ordered and Reviewed  Radiological Study: Ordered and Reviewed  Medical Tests: Ordered and Reviewed           Scribe Attestation:   Scribe #1: I performed the above scribed service and the documentation accurately describes the services I performed. I attest to the accuracy of the note.    Attending:   Physician Attestation Statement for Scribe #1: I, Niranjan Dorantes Jr., MD, personally performed the services described in this documentation, as scribed by Josselyn Nuno, in my presence, and it is both accurate and complete.          Clinical Impression       ICD-10-CM ICD-9-CM   1. Congestive heart failure, unspecified HF chronicity, unspecified heart failure type I50.9 428.0   2. Chest pain R07.9 786.50       Disposition:   Disposition: Discharged  Condition: Stable         Niranjan Dorantes Jr., MD  04/02/19 [id number]

## 2019-04-01 NOTE — DISCHARGE INSTRUCTIONS
Double lasix dose for 3 days and follow up with pcp/cardiology to reassess symptoms.    Regarding CHEST PAIN, I advised the patient that chest pain can be caused by a range of conditions, from not serious to life-threatening such as: heart attack or a blood clot in your lungs, angina indicating poor blood flow to the heart; infection, inflammation, or a fracture in the bones or cartilage in chest wall; a digestive problem, such as acid reflux or a stomach ulcer; or even an anxiety attack.  Instructed patient to follow up with primary healthcare provider for reevaluation and possible diagnostic studies to find the actual cause of the chest pain. Patient was instructed to call 911 or go to the nearest emergency department if they develop any of the following signs of a heart attack: squeezing, pressure, or pain in the chest that lasts longer than five minutes or returns; discomfort or pain in the back, neck, jaw, stomach, or arm; trouble breathing; nausea or vomiting; lightheadedness;  or a sudden cold sweat, especially with chest pain or trouble breathing.  Also return to the emergency department the chest discomfort gets worse (even with medicine); cough or vomit blood; have bowel movements that are black or bloody; cannot stop vomiting; or develop difficulty swallowing.

## 2019-04-02 ENCOUNTER — TELEPHONE (OUTPATIENT)
Dept: CARDIOLOGY | Facility: CLINIC | Age: 84
End: 2019-04-02

## 2019-04-02 NOTE — TELEPHONE ENCOUNTER
Spoke with Mrs. Sin. Mrs Cryer was seen in ED on 4/1/19, lasix dose increased x 3 days (No DDI).    Instructions given to maintain current dose of Warfarin 0mg every Wednesday and 2.5mg on all other days.  Recheck on 4/10/2019.    Caregiver voiced understanding.      ----- Message from Mae Friedman LPN sent at 4/2/2019  3:11 PM CDT -----  Contact: ms cryer-daughternlaw  Update:  Change in medication, please review//thanks!  ----- Message -----  From: Cary Barraza LPN  Sent: 4/2/2019   2:30 PM  To: Mae Friedman LPN    Spoke with patient's daughter-in-law Candelaria. States wants to inform [facility] of patient's medication change.  ----- Message -----  From: Dee Echeverria  Sent: 4/2/2019   7:58 AM  To: Jia Sawyer Staff    states that pt had incident sunday, told by hospital to double up on fluid pill for next 3 days. gave additional amount while in hospital..[phone]

## 2019-04-10 ENCOUNTER — LAB VISIT (OUTPATIENT)
Dept: LAB | Facility: HOSPITAL | Age: 84
End: 2019-04-10
Attending: FAMILY MEDICINE
Payer: MEDICARE

## 2019-04-10 ENCOUNTER — OFFICE VISIT (OUTPATIENT)
Dept: FAMILY MEDICINE | Facility: CLINIC | Age: 84
End: 2019-04-10
Payer: MEDICARE

## 2019-04-10 VITALS
HEIGHT: 66 IN | SYSTOLIC BLOOD PRESSURE: 116 MMHG | DIASTOLIC BLOOD PRESSURE: 64 MMHG | BODY MASS INDEX: 30.09 KG/M2 | WEIGHT: 187.25 LBS | HEART RATE: 72 BPM | OXYGEN SATURATION: 97 % | TEMPERATURE: 99 F

## 2019-04-10 DIAGNOSIS — R05.3 COUGH, PERSISTENT: ICD-10-CM

## 2019-04-10 DIAGNOSIS — I50.31 ACUTE DIASTOLIC CONGESTIVE HEART FAILURE: Primary | ICD-10-CM

## 2019-04-10 DIAGNOSIS — R93.89 ABNORMAL CXR: ICD-10-CM

## 2019-04-10 DIAGNOSIS — Z79.01 LONG TERM (CURRENT) USE OF ANTICOAGULANTS: ICD-10-CM

## 2019-04-10 DIAGNOSIS — I50.31 ACUTE DIASTOLIC CONGESTIVE HEART FAILURE: ICD-10-CM

## 2019-04-10 DIAGNOSIS — F03.91 DEMENTIA WITH BEHAVIORAL DISTURBANCE, UNSPECIFIED DEMENTIA TYPE: ICD-10-CM

## 2019-04-10 LAB
ALBUMIN SERPL BCP-MCNC: 3.3 G/DL (ref 3.5–5.2)
ALP SERPL-CCNC: 129 U/L (ref 55–135)
ALT SERPL W/O P-5'-P-CCNC: 16 U/L (ref 10–44)
ANION GAP SERPL CALC-SCNC: 6 MMOL/L (ref 8–16)
AST SERPL-CCNC: 24 U/L (ref 10–40)
BASOPHILS # BLD AUTO: 0.07 K/UL (ref 0–0.2)
BASOPHILS NFR BLD: 0.7 % (ref 0–1.9)
BILIRUB SERPL-MCNC: 1.1 MG/DL (ref 0.1–1)
BNP SERPL-MCNC: 432 PG/ML (ref 0–99)
BUN SERPL-MCNC: 19 MG/DL (ref 8–23)
CALCIUM SERPL-MCNC: 9.6 MG/DL (ref 8.7–10.5)
CHLORIDE SERPL-SCNC: 106 MMOL/L (ref 95–110)
CO2 SERPL-SCNC: 30 MMOL/L (ref 23–29)
CREAT SERPL-MCNC: 0.8 MG/DL (ref 0.5–1.4)
DIFFERENTIAL METHOD: ABNORMAL
EOSINOPHIL # BLD AUTO: 0.2 K/UL (ref 0–0.5)
EOSINOPHIL NFR BLD: 2.1 % (ref 0–8)
ERYTHROCYTE [DISTWIDTH] IN BLOOD BY AUTOMATED COUNT: 13.3 % (ref 11.5–14.5)
EST. GFR  (AFRICAN AMERICAN): >60 ML/MIN/1.73 M^2
EST. GFR  (NON AFRICAN AMERICAN): >60 ML/MIN/1.73 M^2
GLUCOSE SERPL-MCNC: 77 MG/DL (ref 70–110)
HCT VFR BLD AUTO: 46.7 % (ref 37–48.5)
HGB BLD-MCNC: 14.7 G/DL (ref 12–16)
INR PPP: 2.6 (ref 0.8–1.2)
LYMPHOCYTES # BLD AUTO: 2.1 K/UL (ref 1–4.8)
LYMPHOCYTES NFR BLD: 21.8 % (ref 18–48)
MCH RBC QN AUTO: 29.2 PG (ref 27–31)
MCHC RBC AUTO-ENTMCNC: 31.5 G/DL (ref 32–36)
MCV RBC AUTO: 93 FL (ref 82–98)
MONOCYTES # BLD AUTO: 1 K/UL (ref 0.3–1)
MONOCYTES NFR BLD: 10.3 % (ref 4–15)
NEUTROPHILS # BLD AUTO: 6.1 K/UL (ref 1.8–7.7)
NEUTROPHILS NFR BLD: 64.6 % (ref 38–73)
NRBC BLD-RTO: 0 /100 WBC
PLATELET # BLD AUTO: 248 K/UL (ref 150–350)
PMV BLD AUTO: 11.4 FL (ref 9.2–12.9)
POTASSIUM SERPL-SCNC: 4.4 MMOL/L (ref 3.5–5.1)
PROT SERPL-MCNC: 6.7 G/DL (ref 6–8.4)
PROTHROMBIN TIME: 25.2 SEC (ref 9–12.5)
RBC # BLD AUTO: 5.03 M/UL (ref 4–5.4)
SODIUM SERPL-SCNC: 142 MMOL/L (ref 136–145)
WBC # BLD AUTO: 9.4 K/UL (ref 3.9–12.7)

## 2019-04-10 PROCEDURE — 3288F FALL RISK ASSESSMENT DOCD: CPT | Mod: HCNC,CPTII,S$GLB, | Performed by: FAMILY MEDICINE

## 2019-04-10 PROCEDURE — 99214 PR OFFICE/OUTPT VISIT, EST, LEVL IV, 30-39 MIN: ICD-10-PCS | Mod: HCNC,S$GLB,, | Performed by: FAMILY MEDICINE

## 2019-04-10 PROCEDURE — 36415 COLL VENOUS BLD VENIPUNCTURE: CPT | Mod: HCNC,PO

## 2019-04-10 PROCEDURE — 99999 PR PBB SHADOW E&M-EST. PATIENT-LVL III: CPT | Mod: PBBFAC,HCNC,, | Performed by: FAMILY MEDICINE

## 2019-04-10 PROCEDURE — 85610 PROTHROMBIN TIME: CPT | Mod: HCNC

## 2019-04-10 PROCEDURE — 3078F PR MOST RECENT DIASTOLIC BLOOD PRESSURE < 80 MM HG: ICD-10-PCS | Mod: HCNC,CPTII,S$GLB, | Performed by: FAMILY MEDICINE

## 2019-04-10 PROCEDURE — 83880 ASSAY OF NATRIURETIC PEPTIDE: CPT | Mod: HCNC

## 2019-04-10 PROCEDURE — 99999 PR PBB SHADOW E&M-EST. PATIENT-LVL III: ICD-10-PCS | Mod: PBBFAC,HCNC,, | Performed by: FAMILY MEDICINE

## 2019-04-10 PROCEDURE — 1100F PTFALLS ASSESS-DOCD GE2>/YR: CPT | Mod: HCNC,CPTII,S$GLB, | Performed by: FAMILY MEDICINE

## 2019-04-10 PROCEDURE — 80053 COMPREHEN METABOLIC PANEL: CPT | Mod: HCNC

## 2019-04-10 PROCEDURE — 3078F DIAST BP <80 MM HG: CPT | Mod: HCNC,CPTII,S$GLB, | Performed by: FAMILY MEDICINE

## 2019-04-10 PROCEDURE — 3288F PR FALLS RISK ASSESSMENT DOCUMENTED: ICD-10-PCS | Mod: HCNC,CPTII,S$GLB, | Performed by: FAMILY MEDICINE

## 2019-04-10 PROCEDURE — 85025 COMPLETE CBC W/AUTO DIFF WBC: CPT | Mod: HCNC

## 2019-04-10 PROCEDURE — 3074F SYST BP LT 130 MM HG: CPT | Mod: HCNC,CPTII,S$GLB, | Performed by: FAMILY MEDICINE

## 2019-04-10 PROCEDURE — 3074F PR MOST RECENT SYSTOLIC BLOOD PRESSURE < 130 MM HG: ICD-10-PCS | Mod: HCNC,CPTII,S$GLB, | Performed by: FAMILY MEDICINE

## 2019-04-10 PROCEDURE — 1100F PR PT FALLS ASSESS DOC 2+ FALLS/FALL W/INJURY/YR: ICD-10-PCS | Mod: HCNC,CPTII,S$GLB, | Performed by: FAMILY MEDICINE

## 2019-04-10 PROCEDURE — 99214 OFFICE O/P EST MOD 30 MIN: CPT | Mod: HCNC,S$GLB,, | Performed by: FAMILY MEDICINE

## 2019-04-10 NOTE — PROGRESS NOTES
Subjective:       Patient ID: Delores M Cryer is a 83 y.o. female.    Chief Complaint: Follow-up    83 y old female with dementia, a fib,  diastolic CHF here for f.u on er visit on 4/2 due to CP and sob . Cardiac work up was significant for BNP in the 400 range . She does have Bilateral  Leg edema which she perceives  it as normal . She is unsure as to how much lasix she is taking . Daughter in law fixes her meds . CXR at ER also showed L suprahilar fullness. Non Contrast  CT was recommended./ She has a chronic cough . Former heavy smoker, no longer having CP . She has also been more forgetful and crying often .     Review of Systems   Constitutional: Negative.    HENT: Negative.    Eyes: Negative.    Respiratory: Negative.    Cardiovascular: Negative.    Gastrointestinal: Negative.    Genitourinary: Negative.    Musculoskeletal: Negative.    Skin: Negative.    Hematological: Negative.    Psychiatric/Behavioral: Positive for agitation, confusion and decreased concentration. The patient is nervous/anxious.        Objective:      Physical Exam   Constitutional: She is oriented to person, place, and time. No distress.   HENT:   Head: Normocephalic and atraumatic.   Right Ear: External ear normal.   Left Ear: External ear normal.   Mouth/Throat: No oropharyngeal exudate.   Eyes: Pupils are equal, round, and reactive to light. Conjunctivae and EOM are normal. Right eye exhibits no discharge. Left eye exhibits no discharge. No scleral icterus.   Neck: Normal range of motion. Neck supple. No JVD present. No tracheal deviation present. No thyromegaly present.   Cardiovascular: Normal rate, regular rhythm and normal heart sounds. Exam reveals no gallop and no friction rub.   No murmur heard.  Pulmonary/Chest: Effort normal and breath sounds normal. No stridor. No respiratory distress. She has no wheezes. She has no rales. She exhibits no tenderness.   Abdominal: Soft. Bowel sounds are normal. She exhibits no distension.  There is no tenderness. There is no rebound and no guarding.   Musculoskeletal: Normal range of motion.        Right lower leg: She exhibits edema.        Left lower leg: She exhibits edema.   Lymphadenopathy:     She has no cervical adenopathy.   Neurological: She is alert and oriented to person, place, and time.   Skin: Skin is warm and dry. She is not diaphoretic.   Psychiatric: She has a normal mood and affect. Her behavior is normal. Judgment and thought content normal.       Assessment:     Acute diastolic congestive heart failure  -     Brain natriuretic peptide; Future; Expected date: 04/10/2019  -     CBC auto differential; Future; Expected date: 04/10/2019  -     Comprehensive metabolic panel; Future; Expected date: 04/10/2019    Cough, persistent  -     CT Chest Without Contrast; Future; Expected date: 04/10/2019  -     Brain natriuretic peptide; Future; Expected date: 04/10/2019  -     Comprehensive metabolic panel; Future; Expected date: 04/10/2019    Abnormal CXR  -     Brain natriuretic peptide; Future; Expected date: 04/10/2019  -     CBC auto differential; Future; Expected date: 04/10/2019  -     Comprehensive metabolic panel; Future; Expected date: 04/10/2019    Dementia with behavioral disturbance, unspecified dementia type          Plan:     Tania was seen today for follow-up.    Diagnoses and all orders for this visit:    Acute diastolic congestive heart failure  -     Brain natriuretic peptide; Future  -     CBC auto differential; Future  -     Comprehensive metabolic panel; Future    Cough, persistent  -     CT Chest Without Contrast; Future  -     Brain natriuretic peptide; Future  -     Comprehensive metabolic panel; Future    Abnormal CXR  -     Brain natriuretic peptide; Future  -     CBC auto differential; Future  -     Comprehensive metabolic panel; Future     Labs . Will verify lasix dose with DIL   CT   F.u with neurology

## 2019-04-11 ENCOUNTER — ANTI-COAG VISIT (OUTPATIENT)
Dept: CARDIOLOGY | Facility: CLINIC | Age: 84
End: 2019-04-11
Payer: MEDICARE

## 2019-04-11 PROCEDURE — 93793 PR ANTICOAGULANT MGMT FOR PT TAKING WARFARIN: ICD-10-PCS | Mod: S$GLB,,, | Performed by: INTERNAL MEDICINE

## 2019-04-11 PROCEDURE — 93793 ANTICOAG MGMT PT WARFARIN: CPT | Mod: S$GLB,,, | Performed by: INTERNAL MEDICINE

## 2019-04-11 NOTE — PROGRESS NOTES
Ms. January (caregiver) contacted:  INR is therapeutic at 2.6.  Patient has taken medication as previously instructed.  Will maintain current dose of Warfarin 0 mg on Wednesday and 2.5 mg on all other days per week.  Recheck on 4/22/2019 ([facility]).  Ms. Sin repeated back instructions and voiced understanding.

## 2019-04-12 ENCOUNTER — PATIENT MESSAGE (OUTPATIENT)
Dept: FAMILY MEDICINE | Facility: CLINIC | Age: 84
End: 2019-04-12

## 2019-04-16 ENCOUNTER — TELEPHONE (OUTPATIENT)
Dept: FAMILY MEDICINE | Facility: CLINIC | Age: 84
End: 2019-04-16

## 2019-04-16 ENCOUNTER — HOSPITAL ENCOUNTER (OUTPATIENT)
Dept: RADIOLOGY | Facility: HOSPITAL | Age: 84
Discharge: HOME | End: 2019-04-16
Attending: FAMILY MEDICINE
Payer: MEDICARE

## 2019-04-16 DIAGNOSIS — R05.3 COUGH, PERSISTENT: ICD-10-CM

## 2019-04-16 DIAGNOSIS — R91.8 LUNG MASS: Primary | ICD-10-CM

## 2019-04-16 PROCEDURE — 71250 CT THORAX DX C-: CPT | Mod: TC,HCNC

## 2019-04-22 PROBLEM — I50.32 CHRONIC DIASTOLIC CONGESTIVE HEART FAILURE: Status: ACTIVE | Noted: 2019-04-01

## 2019-04-22 PROBLEM — Z79.01 ANTICOAGULATED ON COUMADIN: Status: ACTIVE | Noted: 2019-04-22

## 2019-04-24 DIAGNOSIS — I10 ESSENTIAL HYPERTENSION: Primary | ICD-10-CM

## 2019-04-24 RX ORDER — AMLODIPINE BESYLATE 5 MG/1
5 TABLET ORAL 2 TIMES DAILY
Qty: 60 TABLET | Refills: 3 | Status: SHIPPED | OUTPATIENT
Start: 2019-04-24 | End: 2019-12-26 | Stop reason: SDUPTHER

## 2019-04-25 ENCOUNTER — TELEPHONE (OUTPATIENT)
Dept: FAMILY MEDICINE | Facility: CLINIC | Age: 84
End: 2019-04-25

## 2019-04-25 NOTE — TELEPHONE ENCOUNTER
Spoke with pt's daughter-in-law January, she states that patients left leg is swollen and red. She states that she just saw message from 4/12/19 that recommended to increase Lasix to 40 mg x 4 days and f/u with Laura. January states that patient will start taking 40 mg of Lasix and then follow up with Laura or Dr. Blount.

## 2019-04-25 NOTE — TELEPHONE ENCOUNTER
----- Message from Nimo Vann sent at 4/25/2019  8:43 AM CDT -----  Contact: daughter in law- beth cryer   She's calling in regards to left leg very swollen and red  She also has a questions concerning pt fluid pills         pls call pt back at [phone]

## 2019-04-26 ENCOUNTER — LAB VISIT (OUTPATIENT)
Dept: LAB | Facility: HOSPITAL | Age: 84
End: 2019-04-26
Attending: INTERNAL MEDICINE
Payer: MEDICARE

## 2019-04-26 ENCOUNTER — ANTI-COAG VISIT (OUTPATIENT)
Dept: CARDIOLOGY | Facility: CLINIC | Age: 84
End: 2019-04-26
Payer: MEDICARE

## 2019-04-26 DIAGNOSIS — Z79.01 LONG TERM (CURRENT) USE OF ANTICOAGULANTS: ICD-10-CM

## 2019-04-26 LAB
INR PPP: 1.9 (ref 0.8–1.2)
PROTHROMBIN TIME: 19.7 SEC (ref 9–12.5)

## 2019-04-26 PROCEDURE — 85610 PROTHROMBIN TIME: CPT | Mod: HCNC

## 2019-04-26 PROCEDURE — 93793 PR ANTICOAGULANT MGMT FOR PT TAKING WARFARIN: ICD-10-PCS | Mod: S$GLB,,,

## 2019-04-26 PROCEDURE — 36415 COLL VENOUS BLD VENIPUNCTURE: CPT | Mod: HCNC,PO

## 2019-04-26 PROCEDURE — 93793 ANTICOAG MGMT PT WARFARIN: CPT | Mod: S$GLB,,,

## 2019-04-26 NOTE — PROGRESS NOTES
Patient's INR is sub-therapeutic at 1.9.  Daughter- in-law, January contacted: No missed doses or significant changes reported.   No abnormal numbness or weakness noted.  Instructions given for patient to take coumadin 5mg on tomorrow (dose for today already given) ; then resume current dose of 2.5mg every evening except Wednesdays. Caregiver voiced understanding.  Follow-up on 5/21/19 at [facility].

## 2019-04-29 ENCOUNTER — INITIAL CONSULT (OUTPATIENT)
Dept: HEMATOLOGY/ONCOLOGY | Facility: CLINIC | Age: 84
End: 2019-04-29
Attending: FAMILY MEDICINE
Payer: MEDICARE

## 2019-04-29 ENCOUNTER — TELEPHONE (OUTPATIENT)
Dept: HEMATOLOGY/ONCOLOGY | Facility: CLINIC | Age: 84
End: 2019-04-29

## 2019-04-29 VITALS
OXYGEN SATURATION: 96 % | TEMPERATURE: 98 F | DIASTOLIC BLOOD PRESSURE: 62 MMHG | HEIGHT: 66 IN | HEART RATE: 69 BPM | SYSTOLIC BLOOD PRESSURE: 147 MMHG | RESPIRATION RATE: 18 BRPM | WEIGHT: 186.06 LBS | BODY MASS INDEX: 29.9 KG/M2

## 2019-04-29 DIAGNOSIS — Z79.01 ANTICOAGULATED ON COUMADIN: ICD-10-CM

## 2019-04-29 DIAGNOSIS — C34.92 NON-SMALL CELL CANCER OF LEFT LUNG: Primary | ICD-10-CM

## 2019-04-29 DIAGNOSIS — C34.32 MALIGNANT NEOPLASM OF LOWER LOBE, LEFT BRONCHUS OR LUNG: ICD-10-CM

## 2019-04-29 DIAGNOSIS — D49.89 NEOPLASM OF ABDOMEN: ICD-10-CM

## 2019-04-29 PROCEDURE — 99205 OFFICE O/P NEW HI 60 MIN: CPT | Mod: HCNC,S$GLB,, | Performed by: INTERNAL MEDICINE

## 2019-04-29 PROCEDURE — 1100F PTFALLS ASSESS-DOCD GE2>/YR: CPT | Mod: HCNC,CPTII,S$GLB, | Performed by: INTERNAL MEDICINE

## 2019-04-29 PROCEDURE — 99999 PR PBB SHADOW E&M-EST. PATIENT-LVL V: ICD-10-PCS | Mod: PBBFAC,HCNC,, | Performed by: INTERNAL MEDICINE

## 2019-04-29 PROCEDURE — 3288F PR FALLS RISK ASSESSMENT DOCUMENTED: ICD-10-PCS | Mod: HCNC,CPTII,S$GLB, | Performed by: INTERNAL MEDICINE

## 2019-04-29 PROCEDURE — 3078F DIAST BP <80 MM HG: CPT | Mod: HCNC,CPTII,S$GLB, | Performed by: INTERNAL MEDICINE

## 2019-04-29 PROCEDURE — 3077F SYST BP >= 140 MM HG: CPT | Mod: HCNC,CPTII,S$GLB, | Performed by: INTERNAL MEDICINE

## 2019-04-29 PROCEDURE — 99999 PR PBB SHADOW E&M-EST. PATIENT-LVL V: CPT | Mod: PBBFAC,HCNC,, | Performed by: INTERNAL MEDICINE

## 2019-04-29 PROCEDURE — 3288F FALL RISK ASSESSMENT DOCD: CPT | Mod: HCNC,CPTII,S$GLB, | Performed by: INTERNAL MEDICINE

## 2019-04-29 PROCEDURE — 3078F PR MOST RECENT DIASTOLIC BLOOD PRESSURE < 80 MM HG: ICD-10-PCS | Mod: HCNC,CPTII,S$GLB, | Performed by: INTERNAL MEDICINE

## 2019-04-29 PROCEDURE — 99205 PR OFFICE/OUTPT VISIT, NEW, LEVL V, 60-74 MIN: ICD-10-PCS | Mod: HCNC,S$GLB,, | Performed by: INTERNAL MEDICINE

## 2019-04-29 PROCEDURE — 3077F PR MOST RECENT SYSTOLIC BLOOD PRESSURE >= 140 MM HG: ICD-10-PCS | Mod: HCNC,CPTII,S$GLB, | Performed by: INTERNAL MEDICINE

## 2019-04-29 PROCEDURE — 1100F PR PT FALLS ASSESS DOC 2+ FALLS/FALL W/INJURY/YR: ICD-10-PCS | Mod: HCNC,CPTII,S$GLB, | Performed by: INTERNAL MEDICINE

## 2019-04-29 RX ORDER — METOPROLOL SUCCINATE 100 MG/1
TABLET, EXTENDED RELEASE ORAL
Qty: 180 TABLET | Refills: 0 | Status: SHIPPED | OUTPATIENT
Start: 2019-04-29 | End: 2019-07-29 | Stop reason: SDUPTHER

## 2019-04-29 RX ORDER — LEVOTHYROXINE SODIUM 125 UG/1
TABLET ORAL
COMMUNITY

## 2019-04-29 NOTE — TELEPHONE ENCOUNTER
----- Message from Fracisco Paredes MD sent at 4/29/2019  2:32 PM CDT -----   she is currently on warfarin which I told her to stop today  ----- Message -----  From: Marry Sanz LPN  Sent: 4/29/2019   2:29 PM  To: Kenney Mendez MD, Fracisco Paredes MD, #    Good afternoon ! This patient needs to be scheduled for a lung biopsy per Dr. Paredes. Please let me know what you have available and I can contact the patient.

## 2019-04-29 NOTE — TELEPHONE ENCOUNTER
Spoke with patients daughter Donna, confirmed MRI appointment for tomorrow at 4:00PM at [facility].

## 2019-04-29 NOTE — PROGRESS NOTES
Subjective:       Patient ID: Delores M Cryer is a 83 y.o. female.    Chief Complaint: Consult; Results; and Lung Cancer    HPI 83-year-old female referred to me for a left lung mass patient was seen in the emergency room with chest pain chest x-ray demonstrated a suspicious area in her left lung CT scan confirmed mass suggestive of malignancy I was asked to see the patient for further evaluation ECOG status 3    Past Medical History:   Diagnosis Date    Acute coronary syndrome     Atrial fibrillation     Congestive heart failure 2019    Coronary artery disease     Elevated cholesterol     Hypertension     Macular degeneration     Thyroid condition      Family History   Problem Relation Age of Onset    Cataracts Mother     Glaucoma Mother     Heart disease Mother     Diabetes Sister     Diabetes Maternal Grandmother     Hypertension Father      Social History     Socioeconomic History    Marital status:      Spouse name: Not on file    Number of children: Not on file    Years of education: Not on file    Highest education level: Not on file   Occupational History    Not on file   Social Needs    Financial resource strain: Not on file    Food insecurity:     Worry: Not on file     Inability: Not on file    Transportation needs:     Medical: Not on file     Non-medical: Not on file   Tobacco Use    Smoking status: Former Smoker     Packs/day: 1.00     Years: 30.00     Pack years: 30.00     Types: Cigarettes     Last attempt to quit: 1983     Years since quittin.3    Smokeless tobacco: Never Used   Substance and Sexual Activity    Alcohol use: No    Drug use: No    Sexual activity: Not on file   Lifestyle    Physical activity:     Days per week: Not on file     Minutes per session: Not on file    Stress: Not on file   Relationships    Social connections:     Talks on phone: Not on file     Gets together: Not on file     Attends [employer] service: Not on file     Active  member of club or organization: Not on file     Attends meetings of clubs or organizations: Not on file     Relationship status: Not on file   Other Topics Concern    Not on file   Social History Narrative    Not on file     Past Surgical History:   Procedure Laterality Date    APPENDECTOMY      BACK SURGERY      x2    CARDIAC CATHETERIZATION  2016    CATARACT EXTRACTION      CHOLECYSTECTOMY      CORONARY ANGIOPLASTY  09/2015    non-obstructive CAD    EYE SURGERY      HEART CATH-LEFT Left 9/21/2015    Performed by [facility] at [facility] CATH LAB    HIP SURGERY Bilateral     HYSTERECTOMY      KNEE SURGERY Bilateral     x 2 each       Labs:  Lab Results   Component Value Date    WBC 9.40 04/10/2019    HGB 14.7 04/10/2019    HCT 46.7 04/10/2019    MCV 93 04/10/2019     04/10/2019     BMP  Lab Results   Component Value Date     04/10/2019    K 4.4 04/10/2019     04/10/2019    CO2 30 (H) 04/10/2019    BUN 19 04/10/2019    CREATININE 0.8 04/10/2019    CALCIUM 9.6 04/10/2019    ANIONGAP 6 (L) 04/10/2019    ESTGFRAFRICA >60.0 04/10/2019    EGFRNONAA >60.0 04/10/2019     Lab Results   Component Value Date    ALT 16 04/10/2019    AST 24 04/10/2019    ALKPHOS 129 04/10/2019    BILITOT 1.1 (H) 04/10/2019       Lab Results   Component Value Date    IRON 69 03/14/2007    FERRITIN 214.3 (H) 03/14/2007     Lab Results   Component Value Date    STACLFLV28 574 02/25/2015     Lab Results   Component Value Date    FOLATE 16.8 03/26/2015     Lab Results   Component Value Date    TSH 0.012 (L) 09/28/2018         Review of Systems   Constitutional: Positive for fatigue. Negative for activity change, appetite change, chills, diaphoresis, fever and unexpected weight change.   HENT: Negative for congestion, dental problem, drooling, ear discharge, ear pain, facial swelling, hearing loss, mouth sores, nosebleeds, postnasal drip, rhinorrhea, sinus pressure, sneezing, sore throat, tinnitus, trouble swallowing  and voice change.    Eyes: Negative for photophobia, pain, discharge, redness, itching and visual disturbance.   Respiratory: Negative for cough, choking, chest tightness, shortness of breath, wheezing and stridor.    Cardiovascular: Positive for chest pain. Negative for palpitations and leg swelling.   Gastrointestinal: Negative for abdominal distention, abdominal pain, anal bleeding, blood in stool, constipation, diarrhea, nausea, rectal pain and vomiting.   Endocrine: Negative for cold intolerance, heat intolerance, polydipsia, polyphagia and polyuria.   Genitourinary: Negative for decreased urine volume, difficulty urinating, dyspareunia, dysuria, enuresis, flank pain, frequency, genital sores, hematuria, menstrual problem, pelvic pain, urgency, vaginal bleeding, vaginal discharge and vaginal pain.   Musculoskeletal: Positive for gait problem. Negative for arthralgias, back pain, joint swelling, myalgias, neck pain and neck stiffness.   Skin: Negative for color change, pallor and rash.   Allergic/Immunologic: Negative for environmental allergies, food allergies and immunocompromised state.   Neurological: Positive for weakness. Negative for dizziness, tremors, seizures, syncope, facial asymmetry, speech difficulty, light-headedness, numbness and headaches.   Hematological: Negative for adenopathy. Does not bruise/bleed easily.   Psychiatric/Behavioral: Positive for dysphoric mood. Negative for agitation, behavioral problems, confusion, decreased concentration, hallucinations, self-injury, sleep disturbance and suicidal ideas. The patient is nervous/anxious. The patient is not hyperactive.        Objective:      Physical Exam   Constitutional: She is oriented to person, place, and time. She appears cachectic. She has a sickly appearance. She appears ill. She appears distressed.   HENT:   Head: Normocephalic and atraumatic.   Right Ear: External ear normal.   Left Ear: External ear normal.   Nose: Nose normal.  Right sinus exhibits no maxillary sinus tenderness and no frontal sinus tenderness. Left sinus exhibits no maxillary sinus tenderness and no frontal sinus tenderness.   Mouth/Throat: Oropharynx is clear and moist. No oropharyngeal exudate.   Eyes: Pupils are equal, round, and reactive to light. Conjunctivae, EOM and lids are normal. Right eye exhibits no discharge. Left eye exhibits no discharge. Right conjunctiva is not injected. Right conjunctiva has no hemorrhage. Left conjunctiva is not injected. Left conjunctiva has no hemorrhage. No scleral icterus.   Neck: Normal range of motion. Neck supple. No JVD present. No tracheal deviation present. No thyromegaly present.   Cardiovascular: Normal rate and regular rhythm.   Pulmonary/Chest: Effort normal. No stridor. No respiratory distress. She exhibits no tenderness.   Abdominal: Soft. She exhibits no distension and no mass. There is no splenomegaly or hepatomegaly. There is no tenderness. There is no rebound.   Musculoskeletal: Normal range of motion. She exhibits no edema or tenderness.   Lymphadenopathy:     She has no cervical adenopathy.     She has no axillary adenopathy.        Right: No supraclavicular adenopathy present.        Left: No supraclavicular adenopathy present.   Neurological: She is alert and oriented to person, place, and time. No cranial nerve deficit. Coordination normal.   Skin: Skin is dry. No rash noted. She is not diaphoretic. No erythema.   Psychiatric: Her behavior is normal. Judgment and thought content normal. Her mood appears anxious. She exhibits a depressed mood.   Vitals reviewed.          Assessment:      1. Non-small cell cancer of left lung    2. Neoplasm of abdomen    3. Malignant neoplasm of lower lobe, left bronchus or lung     4. Anticoagulated on Coumadin           Plan:     High likelihood represents lung cancer left lung discussed implications with family photographs taken this point daughter-in-law states that this been  declining her mental status rapidly over the last month will proceed with MRI brain to rule renal disease once PET scan is done will return for review will ask both Interventional Radiology as well as Pulmonary Medicine to see patient for acquisition tumor material for review told to discontinue warfarin at present time INR 1.9 discussed implications with her family photographs taken answer questions        Fracisco Paredes Jr, MD FACP

## 2019-04-30 ENCOUNTER — HOSPITAL ENCOUNTER (OUTPATIENT)
Dept: RADIOLOGY | Facility: HOSPITAL | Age: 84
Discharge: HOME | End: 2019-04-30
Attending: INTERNAL MEDICINE
Payer: MEDICARE

## 2019-04-30 ENCOUNTER — TELEPHONE (OUTPATIENT)
Dept: CARDIOLOGY | Facility: CLINIC | Age: 84
End: 2019-04-30

## 2019-04-30 DIAGNOSIS — C34.92 NON-SMALL CELL CANCER OF LEFT LUNG: ICD-10-CM

## 2019-04-30 DIAGNOSIS — D49.89 NEOPLASM OF ABDOMEN: ICD-10-CM

## 2019-04-30 PROCEDURE — A9585 GADOBUTROL INJECTION: HCPCS | Mod: HCNC | Performed by: INTERNAL MEDICINE

## 2019-04-30 PROCEDURE — 25500020 PHARM REV CODE 255: Mod: HCNC | Performed by: INTERNAL MEDICINE

## 2019-04-30 PROCEDURE — 70553 MRI BRAIN STEM W/O & W/DYE: CPT | Mod: TC,HCNC

## 2019-04-30 RX ORDER — GADOBUTROL 604.72 MG/ML
10 INJECTION INTRAVENOUS
Status: COMPLETED | OUTPATIENT
Start: 2019-04-30 | End: 2019-04-30

## 2019-04-30 RX ADMIN — GADOBUTROL 8 ML: 604.72 INJECTION INTRAVENOUS at 04:04

## 2019-05-01 ENCOUNTER — TELEPHONE (OUTPATIENT)
Dept: RADIOLOGY | Facility: HOSPITAL | Age: 84
End: 2019-05-01

## 2019-05-02 ENCOUNTER — OFFICE VISIT (OUTPATIENT)
Dept: CARDIOLOGY | Facility: CLINIC | Age: 84
End: 2019-05-02
Payer: MEDICARE

## 2019-05-02 ENCOUNTER — PATIENT MESSAGE (OUTPATIENT)
Dept: CARDIOLOGY | Facility: CLINIC | Age: 84
End: 2019-05-02

## 2019-05-02 ENCOUNTER — TELEPHONE (OUTPATIENT)
Dept: CARDIOLOGY | Facility: CLINIC | Age: 84
End: 2019-05-02

## 2019-05-02 ENCOUNTER — HOSPITAL ENCOUNTER (OUTPATIENT)
Dept: RADIOLOGY | Facility: HOSPITAL | Age: 84
Discharge: HOME | End: 2019-05-02
Attending: INTERNAL MEDICINE
Payer: MEDICARE

## 2019-05-02 ENCOUNTER — OFFICE VISIT (OUTPATIENT)
Dept: PULMONOLOGY | Facility: CLINIC | Age: 84
End: 2019-05-02
Attending: FAMILY MEDICINE
Payer: MEDICARE

## 2019-05-02 VITALS
WEIGHT: 186 LBS | HEART RATE: 71 BPM | BODY MASS INDEX: 29.89 KG/M2 | HEIGHT: 66 IN | OXYGEN SATURATION: 97 % | SYSTOLIC BLOOD PRESSURE: 132 MMHG | DIASTOLIC BLOOD PRESSURE: 78 MMHG

## 2019-05-02 VITALS
HEIGHT: 66 IN | WEIGHT: 187.06 LBS | BODY MASS INDEX: 30.06 KG/M2 | OXYGEN SATURATION: 97 % | RESPIRATION RATE: 18 BRPM | DIASTOLIC BLOOD PRESSURE: 64 MMHG | HEART RATE: 56 BPM | SYSTOLIC BLOOD PRESSURE: 140 MMHG

## 2019-05-02 DIAGNOSIS — E78.2 MIXED HYPERLIPIDEMIA: Chronic | ICD-10-CM

## 2019-05-02 DIAGNOSIS — I50.32 CHRONIC DIASTOLIC CONGESTIVE HEART FAILURE: ICD-10-CM

## 2019-05-02 DIAGNOSIS — I10 ESSENTIAL HYPERTENSION: Chronic | ICD-10-CM

## 2019-05-02 DIAGNOSIS — I25.84 CORONARY ARTERY DISEASE DUE TO CALCIFIED CORONARY LESION: ICD-10-CM

## 2019-05-02 DIAGNOSIS — C34.32 MALIGNANT NEOPLASM OF LOWER LOBE, LEFT BRONCHUS OR LUNG: ICD-10-CM

## 2019-05-02 DIAGNOSIS — D49.89 NEOPLASM OF ABDOMEN: ICD-10-CM

## 2019-05-02 DIAGNOSIS — Z79.01 ON COUMADIN FOR ATRIAL FIBRILLATION: ICD-10-CM

## 2019-05-02 DIAGNOSIS — I48.91 ON COUMADIN FOR ATRIAL FIBRILLATION: ICD-10-CM

## 2019-05-02 DIAGNOSIS — R16.0 LIVER MASS: ICD-10-CM

## 2019-05-02 DIAGNOSIS — I48.20 CHRONIC ATRIAL FIBRILLATION: Primary | Chronic | ICD-10-CM

## 2019-05-02 DIAGNOSIS — I25.10 CORONARY ARTERY DISEASE DUE TO CALCIFIED CORONARY LESION: ICD-10-CM

## 2019-05-02 DIAGNOSIS — R79.1 ABNORMAL COAGULATION PROFILE: ICD-10-CM

## 2019-05-02 DIAGNOSIS — C34.92 NON-SMALL CELL CANCER OF LEFT LUNG: ICD-10-CM

## 2019-05-02 DIAGNOSIS — I48.20 CHRONIC A-FIB: ICD-10-CM

## 2019-05-02 DIAGNOSIS — R91.8 MASS OF UPPER LOBE OF LEFT LUNG: Primary | ICD-10-CM

## 2019-05-02 DIAGNOSIS — Z79.01 ANTICOAGULATED ON COUMADIN: ICD-10-CM

## 2019-05-02 PROCEDURE — 3078F DIAST BP <80 MM HG: CPT | Mod: HCNC,CPTII,S$GLB, | Performed by: INTERNAL MEDICINE

## 2019-05-02 PROCEDURE — 3288F FALL RISK ASSESSMENT DOCD: CPT | Mod: HCNC,CPTII,S$GLB, | Performed by: INTERNAL MEDICINE

## 2019-05-02 PROCEDURE — 3075F SYST BP GE 130 - 139MM HG: CPT | Mod: HCNC,CPTII,S$GLB, | Performed by: INTERNAL MEDICINE

## 2019-05-02 PROCEDURE — 99999 PR PBB SHADOW E&M-EST. PATIENT-LVL III: ICD-10-PCS | Mod: PBBFAC,HCNC,, | Performed by: INTERNAL MEDICINE

## 2019-05-02 PROCEDURE — 99205 OFFICE O/P NEW HI 60 MIN: CPT | Mod: HCNC,S$GLB,, | Performed by: INTERNAL MEDICINE

## 2019-05-02 PROCEDURE — 3078F PR MOST RECENT DIASTOLIC BLOOD PRESSURE < 80 MM HG: ICD-10-PCS | Mod: HCNC,CPTII,S$GLB, | Performed by: INTERNAL MEDICINE

## 2019-05-02 PROCEDURE — 1100F PR PT FALLS ASSESS DOC 2+ FALLS/FALL W/INJURY/YR: ICD-10-PCS | Mod: HCNC,CPTII,S$GLB, | Performed by: INTERNAL MEDICINE

## 2019-05-02 PROCEDURE — 1100F PTFALLS ASSESS-DOCD GE2>/YR: CPT | Mod: HCNC,CPTII,S$GLB, | Performed by: INTERNAL MEDICINE

## 2019-05-02 PROCEDURE — A9552 F18 FDG: HCPCS | Mod: HCNC

## 2019-05-02 PROCEDURE — 3077F PR MOST RECENT SYSTOLIC BLOOD PRESSURE >= 140 MM HG: ICD-10-PCS | Mod: HCNC,CPTII,S$GLB, | Performed by: INTERNAL MEDICINE

## 2019-05-02 PROCEDURE — 3075F PR MOST RECENT SYSTOLIC BLOOD PRESS GE 130-139MM HG: ICD-10-PCS | Mod: HCNC,CPTII,S$GLB, | Performed by: INTERNAL MEDICINE

## 2019-05-02 PROCEDURE — 78815 PET IMAGE W/CT SKULL-THIGH: CPT | Mod: 26,PI,HCNC, | Performed by: RADIOLOGY

## 2019-05-02 PROCEDURE — 99205 PR OFFICE/OUTPT VISIT, NEW, LEVL V, 60-74 MIN: ICD-10-PCS | Mod: HCNC,S$GLB,, | Performed by: INTERNAL MEDICINE

## 2019-05-02 PROCEDURE — 3288F PR FALLS RISK ASSESSMENT DOCUMENTED: ICD-10-PCS | Mod: HCNC,CPTII,S$GLB, | Performed by: INTERNAL MEDICINE

## 2019-05-02 PROCEDURE — 78815 NM PET CT ROUTINE: ICD-10-PCS | Mod: 26,PI,HCNC, | Performed by: RADIOLOGY

## 2019-05-02 PROCEDURE — 99215 PR OFFICE/OUTPT VISIT, EST, LEVL V, 40-54 MIN: ICD-10-PCS | Mod: HCNC,S$GLB,, | Performed by: INTERNAL MEDICINE

## 2019-05-02 PROCEDURE — 99999 PR PBB SHADOW E&M-EST. PATIENT-LVL III: CPT | Mod: PBBFAC,HCNC,, | Performed by: INTERNAL MEDICINE

## 2019-05-02 PROCEDURE — 99215 OFFICE O/P EST HI 40 MIN: CPT | Mod: HCNC,S$GLB,, | Performed by: INTERNAL MEDICINE

## 2019-05-02 PROCEDURE — 78815 PET IMAGE W/CT SKULL-THIGH: CPT | Mod: TC,HCNC

## 2019-05-02 PROCEDURE — 3077F SYST BP >= 140 MM HG: CPT | Mod: HCNC,CPTII,S$GLB, | Performed by: INTERNAL MEDICINE

## 2019-05-02 RX ORDER — ENOXAPARIN SODIUM 100 MG/ML
1 INJECTION SUBCUTANEOUS 2 TIMES DAILY
Qty: 20 ML | Refills: 0 | Status: SHIPPED | OUTPATIENT
Start: 2019-05-02 | End: 2019-05-02 | Stop reason: SDUPTHER

## 2019-05-02 RX ORDER — ENOXAPARIN SODIUM 100 MG/ML
1 INJECTION SUBCUTANEOUS 2 TIMES DAILY
Qty: 16 ML | Refills: 0 | Status: SHIPPED | OUTPATIENT
Start: 2019-05-02

## 2019-05-02 NOTE — PROGRESS NOTES
Initial Outpatient Pulmonary Evaluation       SUBJECTIVE:     Chief Complaint   Patient presents with    Lung Mass       History of Present Illness:    Patient is a 83 y.o. female referred for evaluation of left lung mass.    Early April 2019 patient presented to the emergency room with right-sided pleuritic chest pain.  Chest x-ray was abnormal showing fullness of the left hilum, followed by CT chest which showed large left lung mass 5 cm with left hilar extension.    Patient complains of productive cough of clear sputum for few weeks.    Shortness of breath on exertion.    Thirty pack year former smoker.    Not on inhalation at home.  No personal history of cancer.    Has AFib.  Off Coumadin this week .       Review of Systems   Constitutional: Negative for fever and chills.   HENT: Negative for nosebleeds.    Eyes: Negative for redness.        Visual disturbances   Respiratory: Positive for cough, sputum production, shortness of breath and dyspnea on extertion. Negative for choking.    Cardiovascular: Positive for chest pain.        Right-sided pleuritic chest pain   Genitourinary: Negative for hematuria.   Endocrine: Hypothyroid Negative for cold intolerance.    Musculoskeletal: Positive for arthralgias, back pain and gait problem.   Skin: Negative for rash.   Gastrointestinal: Positive for abdominal pain. Negative for vomiting.   Neurological: Negative for syncope.   Hematological: Negative for adenopathy.   Psychiatric/Behavioral: Negative for confusion.       Review of patient's allergies indicates:   Allergen Reactions    Alendronate      Other reaction(s): Joint pain  Other reaction(s): Joint pain    Bacitracin     Nitrofurantoin macrocrystalline      Other reaction(s): Unknown    Sulfa (sulfonamide antibiotics)      Other reaction(s): Shortness of breath  Other reaction(s): Hives  Other reaction(s): Flushing (skin)  Other reaction(s): Edema  Other  reaction(s): Shortness of breath  Other reaction(s): Hives  Other reaction(s): Flushing (skin)  Other reaction(s): Edema       Current Outpatient Medications   Medication Sig Dispense Refill    amLODIPine (NORVASC) 5 MG tablet Take 1 tablet (5 mg total) by mouth 2 (two) times daily. 60 tablet 3    atorvastatin (LIPITOR) 40 MG tablet TAKE 1 TABLET( 40 MG TOTAL) BY MOUTH EVERY EVENING. 90 tablet 0    calcium carbonate-vit D3-min (CALTRATE 600+D PLUS MINERALS) 600 mg (1,500 mg)-400 unit Chew Take 1 tablet by mouth Twice daily.      citalopram (CELEXA) 20 MG tablet Take 1 tablet (20 mg total) by mouth once daily. 90 tablet 0    donepezil (ARICEPT) 5 MG tablet Take 1 tablet (5 mg total) by mouth every evening. 90 tablet 0    levothyroxine (SYNTHROID) 112 MCG tablet TAKE 1 TABLET BY MOUTH EVERY DAY 90 tablet 1    levothyroxine (SYNTHROID) 125 MCG tablet       methylcellulose (CITRUCEL ORAL) Take 1 tablet by mouth once daily.      metoprolol succinate (TOPROL-XL) 100 MG 24 hr tablet TAKE 1 TABLET BY MOUTH TWICE DAILY 180 tablet 0    MULTIVITAMIN W-MINERALS/LUTEIN (CENTRUM SILVER ORAL) Take 1 tablet by mouth.      omeprazole (PRILOSEC) 40 MG capsule Take 1 capsule (40 mg total) by mouth once daily. 90 capsule 1    triamcinolone acetonide 0.1% (KENALOG) 0.1 % cream Apply topically 2 (two) times daily. 80 g 0    warfarin (COUMADIN) 5 MG tablet Take 1 tablet (5 mg total) by mouth Daily. (Patient taking differently: Take 1/2 tablet (2.5mg) by mouth every day except on Wednesdays. Patient will not take any coumadin on Wednesdays.) 30 tablet 3    furosemide (LASIX) 20 MG tablet Take 1 tablet (20 mg total) by mouth daily as needed. Take next 3 days for leg swelling/breathing issues 60 tablet 3    metoprolol tartrate (LOPRESSOR) 100 MG tablet Take 100 mg by mouth.       No current facility-administered medications for this visit.        Past Medical History:   Diagnosis Date    Acute coronary syndrome     Atrial  "fibrillation     Congestive heart failure 2019    Coronary artery disease     Elevated cholesterol     Hypertension     Macular degeneration     Thyroid condition      Past Surgical History:   Procedure Laterality Date    APPENDECTOMY      BACK SURGERY      x2    CARDIAC CATHETERIZATION  2016    CATARACT EXTRACTION      CHOLECYSTECTOMY      CORONARY ANGIOPLASTY  2015    non-obstructive CAD    EYE SURGERY      HEART CATH-LEFT Left 2015    [facility] CATH LAB    HIP SURGERY Bilateral     HYSTERECTOMY      KNEE SURGERY Bilateral     x 2 each     Family History   Problem Relation Age of Onset    Cataracts Mother     Glaucoma Mother     Heart disease Mother     Diabetes Sister     Diabetes Maternal Grandmother     Hypertension Father      Social History     Tobacco Use    Smoking status: Former Smoker     Packs/day: 1.00     Years: 30.00     Pack years: 30.00     Types: Cigarettes     Last attempt to quit: 1983     Years since quittin.3    Smokeless tobacco: Never Used   Substance Use Topics    Alcohol use: No    Drug use: No          OBJECTIVE:     Vital Signs (Most Recent)  Vital Signs  Pulse: (!) 56  Resp: 18  SpO2: 97 %  BP: (!) 140/64  Height and Weight  Height: 5' 6" (167.6 cm)  Weight: 84.8 kg (187 lb 1 oz)  BSA (Calculated - sq m): 1.99 sq meters  BMI (Calculated): 30.3  Weight in (lb) to have BMI = 25: 154.6]  Wt Readings from Last 2 Encounters:   19 84.8 kg (187 lb 1 oz)   19 84.4 kg (186 lb 1.1 oz)         Physical Exam:  Physical Exam   Constitutional: She is oriented to person, place, and time. She appears well-developed.   HENT:   Head: Normocephalic.   Neck: Neck supple.   Cardiovascular: Normal rate.   Pulmonary/Chest: Normal expansion. No stridor. No respiratory distress. She exhibits no tenderness.   Abdominal: Soft.   Musculoskeletal: She exhibits no tenderness.   Lymphadenopathy:     She has no cervical " adenopathy.   Neurological: She is alert and oriented to person, place, and time. Gait normal.   Skin: Skin is warm.   Psychiatric: She has a normal mood and affect. Her behavior is normal. Judgment and thought content normal.   Nursing note and vitals reviewed.      Laboratory  Lab Results   Component Value Date    WBC 9.40 04/10/2019    RBC 5.03 04/10/2019    HGB 14.7 04/10/2019    HCT 46.7 04/10/2019    MCV 93 04/10/2019    MCH 29.2 04/10/2019    MCHC 31.5 (L) 04/10/2019    RDW 13.3 04/10/2019     04/10/2019    MPV 11.4 04/10/2019    GRAN 6.1 04/10/2019    GRAN 64.6 04/10/2019    LYMPH 2.1 04/10/2019    LYMPH 21.8 04/10/2019    MONO 1.0 04/10/2019    MONO 10.3 04/10/2019    EOS 0.2 04/10/2019    BASO 0.07 04/10/2019    EOSINOPHIL 2.1 04/10/2019    BASOPHIL 0.7 04/10/2019       BMP  Lab Results   Component Value Date     04/10/2019    K 4.4 04/10/2019     04/10/2019    CO2 30 (H) 04/10/2019    BUN 19 04/10/2019    CREATININE 0.8 04/10/2019    CALCIUM 9.6 04/10/2019    ANIONGAP 6 (L) 04/10/2019    ESTGFRAFRICA >60.0 04/10/2019    EGFRNONAA >60.0 04/10/2019    AST 24 04/10/2019    ALT 16 04/10/2019    PROT 6.7 04/10/2019       Lab Results   Component Value Date     (H) 04/10/2019     (H) 04/01/2019     (H) 01/30/2019     (H) 01/21/2019     (H) 05/23/2018     (H) 07/11/2017       Lab Results   Component Value Date    TSH 0.012 (L) 09/28/2018       Lab Results   Component Value Date    SEDRATE 5 03/23/2005       Lab Results   Component Value Date    CRP 2.2 01/21/2019     INR 04/26/2019 1.6.    EKG April 1, 2019 AFib at 74 beats per minute.      2D echo July 2017 AF within normal.    Carotid ultrasound mild stenosis bilaterally July 2018.      Diagnostic Results:  I have personally reviewed today the following studies :    Chest x-ray April 1, 2019.  Large left upper lobe mass.    CT chest without contrast April 16, 2019 large left upper lobe 5 cm mass with  hilar extension.  Left liver neoplasm.        ASSESSMENT/PLAN:     Mass of upper lobe of left lung  -     Case request GI: ENDOBRONCHIAL ULTRASOUND (EBUS)    Liver mass  -     Protime-INR; Future; Expected date: 05/02/2019  -     APTT; Future; Expected date: 05/02/2019    On Coumadin for atrial fibrillation  -     Protime-INR; Future; Expected date: 05/02/2019  -     APTT; Future; Expected date: 05/02/2019    Abnormal coagulation profile   -     Protime-INR; Future; Expected date: 05/02/2019  -     APTT; Future; Expected date: 05/02/2019    Chronic a-fib      Large left upper lobe mass with hilar extension.  PET scan scheduled for today.    Case requested for bronchoscopy for possible endobronchial biopsy EBBx , EBUS guided endobronchial biopsy.    Off anticoagulation.  Repeat coags.    Cardiology preop evaluation.     Case discussed at length with patient, patient , patient's son.      MEDICAL DECISION MAKING: Moderate to high complexity.  Overall, the multiple problems listed are of moderate to high severity that may impact quality of life and activities of daily living. Side effects of medications, treatment plan as well as options and alternatives reviewed and discussed with patient. There was counseling of patient concerning these issues.     TIME SPENT WITH PATIENT: Time spent: 60 minutes in face to face  discussion concerning diagnosis, prognosis, review of lab and test results, benefits of treatment as well as management of disease, counseling of patient and coordination of care between various health  care providers . Greater than half the time spent was used for coordination of care and counseling of patient.         Follow up in about 7 weeks (around 6/20/2019).    This note was prepared using voice recognition system and is likely to have sound alike errors that may have been overlooked even after proof reading.  Please call me with any questions    Discussed diagnosis, its evaluation, treatment  and usual course. All questions answered.    Thank you for the courtesy of participating in the care of this patient    Mary Carvajal MD

## 2019-05-02 NOTE — TELEPHONE ENCOUNTER
Attempted to reach Mrs Sin, patient's daughter in law x 3 with no success to discuss upcoming procedure instructions.    Message left: Please contact coumadin clinic at [phone]

## 2019-05-02 NOTE — TELEPHONE ENCOUNTER
----- Message from Franki Mcwilliams sent at 5/2/2019  2:40 PM CDT -----  Contact: Donna Valdovinos-Pt's daughter  Type:  Sooner Apoointment Request    Caller is requesting a sooner appointment.  Caller declined first available appointment listed below.  Caller will not accept being placed on the waitlist and is requesting a message be sent to doctor.  Name of Caller: Donna Valdovinos-pt's daughter  When is the first available appointment? unknown  Symptoms: follow-up appt  Would the patient rather a call back or a response via [facility]? Call back   Best Call Back Number: [phone]   Additional Information: She is calling stating that they never received a call back after the pt's appt in regrads to scheduling her follow-up appt,please advise.

## 2019-05-02 NOTE — LETTER
May 2, 2019      Viviana Melchor MD  [address] [facility] [address]           [facility]  [address] [place] [id number]  Phone: [phone]  Fax: [phone]          Patient: Delores M Cryer   MR Number: [id number]   YOB: 1935   Date of Visit: 5/2/2019       Dear Dr. Viviana Melchor:    Thank you for referring Delores Cryer to me for evaluation. Attached you will find relevant portions of my assessment and plan of care.    If you have questions, please do not hesitate to call me. I look forward to following Delores Cryer along with you.    Sincerely,    Mary Carvajal MD    Enclosure  CC:  No Recipients    If you would like to receive this communication electronically, please contact [email or web address] or [phone] to request more information on [employer] Link access.    For providers and/or their staff who would like to refer a patient to [facility], please contact us through our one-stop-shop provider referral line, [facility], at [phone].    If you feel you have received this communication in error or would no longer like to receive these types of communications, please e-mail [email or web address]

## 2019-05-02 NOTE — PROGRESS NOTES
Subjective:   Patient ID:  Delores M Cryer is a 83 y.o. female who presents for cardiac consult of Coronary Artery Disease; Hypertension; and Atrial Fibrillation      HPI  The patient came in today for cardiac consult of Coronary Artery Disease; Hypertension; and Atrial Fibrillation    Delores M Cryer is a 83 y.o. female pt with current medical conditions CAD, Chronic AF, HTN presents for CV follow up.      6/28/18  She was started on Eliquis 1 year ago when she was admitted to [facility]. She has seen Dr. Martino in the past about 2 years ago at which point she was only on ASA but then started on Eliquis 1 year ago by Dr. Mitchell. Her  is present and the room and described she has mild dementia. She has no recent falls or bleeding. Does have occasional bruising.     12/20/18  Recent Carotids with mild plaque b/l. She has been started on Aricept by Dr. Laureano for dementia. Bp was was elevated last visit with PCP, Norvasc increased. She also has mild SOB/BOWMAN.  has said Eliquis costs > $400/month, will change to coumadin and enroll her in home health for INR check and PT/OT if needed. Mild leg swelling.     5/2/19  She had recent CT chest with mass which needs bronchoscopy for possible endobronchial biopsy by Dr. Carvajal, concern for malignancy. PET CT ordered as well. She presented to the ER last month with R sided pleuritic type chest pain. Trops neg, BNP elevated.   Recent INR 1.9, repeat ordered. Will need to be bridged on Lovenox before biopsy. NO CP/SOB lately.     Patient feels  no chest pain, no PND, no palpitation, no dizziness, no syncope, no CNS symptoms.    Patient is compliant with medications.    2D ECHO  CONCLUSIONS     1 - Normal left ventricular systolic function (EF 60-65%).     2 - Normal left ventricular diastolic function.     3 - Normal right ventricular systolic function .     4 - Concentric hypertrophy.     This document has been electronically    SIGNED BY: Richard Mitchell MD On:  2017 13:40    Carotid U/S  CONCLUSIONS   There is 20 - 39% right Internal Carotid stenosis.  There is 20 - 39% left Internal Carotid stenosis.    This document has been electronically    SIGNED BY: Camila Robb MD On: 2018 17:55  Past Medical History:   Diagnosis Date    Acute coronary syndrome     Atrial fibrillation     Cancer     Congestive heart failure 2019    Coronary artery disease     Elevated cholesterol     Hypertension     Macular degeneration     Thyroid condition        Past Surgical History:   Procedure Laterality Date    APPENDECTOMY      BACK SURGERY      x2    CARDIAC CATHETERIZATION  2016    CATARACT EXTRACTION      CHOLECYSTECTOMY      CORONARY ANGIOPLASTY  2015    non-obstructive CAD    EYE SURGERY      HEART CATH-LEFT Left 2015    Performed by [facility] CATH LAB    HIP SURGERY Bilateral     HYSTERECTOMY      KNEE SURGERY Bilateral     x 2 each       Social History     Tobacco Use    Smoking status: Former Smoker     Packs/day: 1.00     Years: 30.00     Pack years: 30.00     Types: Cigarettes     Last attempt to quit: 1983     Years since quittin.3    Smokeless tobacco: Never Used   Substance Use Topics    Alcohol use: No    Drug use: No       Family History   Problem Relation Age of Onset    Cataracts Mother     Glaucoma Mother     Heart disease Mother     Diabetes Sister     Diabetes Maternal Grandmother     Hypertension Father        Patient's Medications   New Prescriptions    ENOXAPARIN (LOVENOX) 80 MG/0.8 ML SYRG    Inject 0.8 mLs (80 mg total) into the skin 2 (two) times daily.   Previous Medications    AMLODIPINE (NORVASC) 5 MG TABLET    Take 1 tablet (5 mg total) by mouth 2 (two) times daily.    ATORVASTATIN (LIPITOR) 40 MG TABLET    TAKE 1 TABLET( 40 MG TOTAL) BY MOUTH EVERY EVENING.    CALCIUM CARBONATE-VIT D3-MIN (CALTRATE 600+D PLUS MINERALS) 600 MG (1,500 MG)-400 UNIT CHEW    Take 1 tablet by mouth Twice  daily.    CITALOPRAM (CELEXA) 20 MG TABLET    Take 1 tablet (20 mg total) by mouth once daily.    DONEPEZIL (ARICEPT) 5 MG TABLET    Take 1 tablet (5 mg total) by mouth every evening.    FUROSEMIDE (LASIX) 20 MG TABLET    Take 1 tablet (20 mg total) by mouth daily as needed. Take next 3 days for leg swelling/breathing issues    LEVOTHYROXINE (SYNTHROID) 112 MCG TABLET    TAKE 1 TABLET BY MOUTH EVERY DAY    LEVOTHYROXINE (SYNTHROID) 125 MCG TABLET        METHYLCELLULOSE (CITRUCEL ORAL)    Take 1 tablet by mouth once daily.    METOPROLOL SUCCINATE (TOPROL-XL) 100 MG 24 HR TABLET    TAKE 1 TABLET BY MOUTH TWICE DAILY    METOPROLOL TARTRATE (LOPRESSOR) 100 MG TABLET    Take 100 mg by mouth.    MULTIVITAMIN W-MINERALS/LUTEIN (CENTRUM SILVER ORAL)    Take 1 tablet by mouth.    OMEPRAZOLE (PRILOSEC) 40 MG CAPSULE    Take 1 capsule (40 mg total) by mouth once daily.    TRIAMCINOLONE ACETONIDE 0.1% (KENALOG) 0.1 % CREAM    Apply topically 2 (two) times daily.    WARFARIN (COUMADIN) 5 MG TABLET    Take 1 tablet (5 mg total) by mouth Daily.   Modified Medications    No medications on file   Discontinued Medications    No medications on file       Review of Systems   Constitutional: Negative.    HENT: Negative.    Eyes: Negative.    Respiratory: Positive for shortness of breath.    Cardiovascular: Positive for leg swelling.   Gastrointestinal: Negative.    Genitourinary: Negative.    Musculoskeletal: Negative.    Skin: Negative.    Neurological: Negative.    Endo/Heme/Allergies: Negative.    Psychiatric/Behavioral: Negative.    All 12 systems otherwise negative.      Wt Readings from Last 3 Encounters:   05/02/19 84.8 kg (187 lb 1 oz)   05/02/19 84.4 kg (186 lb)   04/29/19 84.4 kg (186 lb 1.1 oz)     Temp Readings from Last 3 Encounters:   04/29/19 97.5 °F (36.4 °C) (Oral)   04/10/19 99 °F (37.2 °C) (Tympanic)   04/01/19 97.6 °F (36.4 °C) (Oral)     BP Readings from Last 3 Encounters:   05/02/19 (!) 140/64   05/02/19 132/78  "  04/29/19 (!) 147/62     Pulse Readings from Last 3 Encounters:   05/02/19 (!) 56   05/02/19 71   04/29/19 69       /78   Pulse 71   Ht 5' 6" (1.676 m)   Wt 84.4 kg (186 lb)   SpO2 97%   BMI 30.02 kg/m²     Objective:   Physical Exam   Constitutional: She is oriented to person, place, and time. She appears well-developed and well-nourished. No distress.   HENT:   Head: Normocephalic and atraumatic.   Nose: Nose normal.   Mouth/Throat: Oropharynx is clear and moist.   Eyes: Conjunctivae and EOM are normal. No scleral icterus.   Neck: Normal range of motion. Neck supple. No JVD present. No thyromegaly present.   Cardiovascular: Normal rate, S1 normal and S2 normal. An irregularly irregular rhythm present. Exam reveals no gallop, no S3, no S4 and no friction rub.   No murmur heard.  Pulmonary/Chest: Effort normal and breath sounds normal. No stridor. No respiratory distress. She has no wheezes. She has no rales. She exhibits no tenderness.   Abdominal: Soft. Bowel sounds are normal. She exhibits no distension and no mass. There is no tenderness. There is no rebound.   Genitourinary:   Genitourinary Comments: Deferred   Musculoskeletal: Normal range of motion. She exhibits edema (1+). She exhibits no tenderness or deformity.   Lymphadenopathy:     She has no cervical adenopathy.   Neurological: She is alert and oriented to person, place, and time. She exhibits normal muscle tone. Coordination normal.   Skin: Skin is warm and dry. No rash noted. She is not diaphoretic. No erythema. No pallor.   Psychiatric: She has a normal mood and affect. Her behavior is normal. Judgment and thought content normal.   Nursing note and vitals reviewed.      Lab Results   Component Value Date     04/10/2019    K 4.4 04/10/2019     04/10/2019    CO2 30 (H) 04/10/2019    BUN 19 04/10/2019    CREATININE 0.8 04/10/2019    GLU 77 04/10/2019    HGBA1C 5.4 09/28/2018    MG 2.3 02/10/2004    AST 24 04/10/2019    ALT 16 " 04/10/2019    ALBUMIN 3.3 (L) 04/10/2019    PROT 6.7 04/10/2019    BILITOT 1.1 (H) 04/10/2019    WBC 9.40 04/10/2019    HGB 14.7 04/10/2019    HCT 46.7 04/10/2019    MCV 93 04/10/2019     04/10/2019    INR 1.9 (H) 04/26/2019    INR 1.9 (H) 05/18/2009    TSH 0.012 (L) 09/28/2018    CHOL 110 (L) 09/28/2018    HDL 41 09/28/2018    LDLCALC 55.6 (L) 09/28/2018    TRIG 67 09/28/2018     (H) 04/10/2019     Assessment:      1. Chronic atrial fibrillation    2. Mixed hyperlipidemia    3. Essential hypertension    4. Chronic diastolic congestive heart failure    5. Coronary artery disease due to calcified coronary lesion    6. Anticoagulated on Coumadin        Plan:   1. Chronic AF  - rate controlled  - cont coumadin   - h/o CVA needs to continue a/c due to high CHADSVASC  - will bridge with Lovenox injection    2. CAD  - no angina  - cont meds    3. HTN -  - cont meds    4. HFpEF  - low salt diet  - cont lasix PRN    5. HLD  - cont statin    6. Carotid bruit/pior CVA  - mild plaque  - cont meds     7. Preop prior to EBUS  - proceed with low cardiac risk  - rec Lovenox bridging, will have more instructions from coumadin clinic    Thank you for allowing me to participate in this patient's care. Please do not hesitate to contact me with any questions or concerns. Consult note has been forwarded to the referral physician.

## 2019-05-02 NOTE — TELEPHONE ENCOUNTER
Mrs. Cryer has a biopsy scheduled for 19.  Coumadin has already been placed on hold by hematologist. Pre-Procedure instructions given by cardiologist on today.    Full instructions provided to Beth Cryer, daughter-in-law. DaughterDonna, informed and e-mail with instructions sent.      See below:    Patient will begin Lovenox 80mg injections twice daily on today.  Last dose of Lovenox will be taken the morning of 19 (24 hours prior to procedure)    Resume coumadin the evening of procedure.  Unless provider states otherwise.    Coumadin dosin/9: 5mg  5/10: 5mg  /: 2.5mg  /: 2.5mg    Resume Lovenox injections on 5/10 (evening dose only). Unless provider states otherwise.     Continue Lovenox injections until next INR check     Recheck INR on Monday, 19 at [place] lab.       Please contact coumadin clinic with any questions and/or if provider changes any post-procedure instructions. [phone] or [phone]

## 2019-05-02 NOTE — TELEPHONE ENCOUNTER
Returned call. Daughter requesting next date to go to lab for INR after bx.    Below are Dr. Ro instructions;    Take Lovenox twice a day until procedure, the day before procedure do not take evening dose, resume Lovenox the evening after procedure if no bleeding and continue for 3 days, Resume Coumadin evening of procedure as well and follow up with coumadin clinic.     Please call daughter.

## 2019-05-02 NOTE — PATIENT INSTRUCTIONS
Take Lovenox twice a day until procedure, the day before procedure do not take evening dose, resume Lovenox the evening after procedure if no bleeding and continue for 3 days, Resume Coumadin evening of procedure as well and follow up with coumadin clinic.

## 2019-05-03 ENCOUNTER — TELEPHONE (OUTPATIENT)
Dept: RADIATION ONCOLOGY | Facility: CLINIC | Age: 84
End: 2019-05-03

## 2019-05-03 ENCOUNTER — OFFICE VISIT (OUTPATIENT)
Dept: HEMATOLOGY/ONCOLOGY | Facility: CLINIC | Age: 84
End: 2019-05-03
Payer: MEDICARE

## 2019-05-03 ENCOUNTER — TELEPHONE (OUTPATIENT)
Dept: CARDIOLOGY | Facility: CLINIC | Age: 84
End: 2019-05-03

## 2019-05-03 VITALS
HEART RATE: 74 BPM | WEIGHT: 185.88 LBS | SYSTOLIC BLOOD PRESSURE: 138 MMHG | BODY MASS INDEX: 30 KG/M2 | DIASTOLIC BLOOD PRESSURE: 71 MMHG | OXYGEN SATURATION: 98 % | TEMPERATURE: 97 F

## 2019-05-03 DIAGNOSIS — C34.92 NON-SMALL CELL CANCER OF LEFT LUNG: Primary | ICD-10-CM

## 2019-05-03 DIAGNOSIS — D49.89 NEOPLASM OF ABDOMEN: ICD-10-CM

## 2019-05-03 PROCEDURE — 3078F DIAST BP <80 MM HG: CPT | Mod: HCNC,CPTII,S$GLB, | Performed by: INTERNAL MEDICINE

## 2019-05-03 PROCEDURE — 99215 OFFICE O/P EST HI 40 MIN: CPT | Mod: HCNC,S$GLB,, | Performed by: INTERNAL MEDICINE

## 2019-05-03 PROCEDURE — 99215 PR OFFICE/OUTPT VISIT, EST, LEVL V, 40-54 MIN: ICD-10-PCS | Mod: HCNC,S$GLB,, | Performed by: INTERNAL MEDICINE

## 2019-05-03 PROCEDURE — 1101F PR PT FALLS ASSESS DOC 0-1 FALLS W/OUT INJ PAST YR: ICD-10-PCS | Mod: HCNC,CPTII,S$GLB, | Performed by: INTERNAL MEDICINE

## 2019-05-03 PROCEDURE — 99999 PR PBB SHADOW E&M-EST. PATIENT-LVL IV: CPT | Mod: PBBFAC,HCNC,, | Performed by: INTERNAL MEDICINE

## 2019-05-03 PROCEDURE — 1101F PT FALLS ASSESS-DOCD LE1/YR: CPT | Mod: HCNC,CPTII,S$GLB, | Performed by: INTERNAL MEDICINE

## 2019-05-03 PROCEDURE — 99999 PR PBB SHADOW E&M-EST. PATIENT-LVL IV: ICD-10-PCS | Mod: PBBFAC,HCNC,, | Performed by: INTERNAL MEDICINE

## 2019-05-03 PROCEDURE — 3075F SYST BP GE 130 - 139MM HG: CPT | Mod: HCNC,CPTII,S$GLB, | Performed by: INTERNAL MEDICINE

## 2019-05-03 PROCEDURE — 3075F PR MOST RECENT SYSTOLIC BLOOD PRESS GE 130-139MM HG: ICD-10-PCS | Mod: HCNC,CPTII,S$GLB, | Performed by: INTERNAL MEDICINE

## 2019-05-03 PROCEDURE — 3078F PR MOST RECENT DIASTOLIC BLOOD PRESSURE < 80 MM HG: ICD-10-PCS | Mod: HCNC,CPTII,S$GLB, | Performed by: INTERNAL MEDICINE

## 2019-05-03 NOTE — PROGRESS NOTES
Subjective:       Patient ID: Delores M Cryer is a 83 y.o. female.    Chief Complaint: Results and Lung Cancer    HPI 83-year-old female returns with PET positive finding in left chest.  Awaiting results of bronchoscopic evaluation not a candidate for CT-directed biopsy of because of mass proximity to aorta.  Scheduled to be on low-molecular heparin bridge next week for planned endoscopic evaluation on .  ECOG status 3    Past Medical History:   Diagnosis Date    Acute coronary syndrome     Atrial fibrillation     Cancer     Congestive heart failure 2019    Coronary artery disease     Elevated cholesterol     Hypertension     Macular degeneration     Thyroid condition      Family History   Problem Relation Age of Onset    Cataracts Mother     Glaucoma Mother     Heart disease Mother     Diabetes Sister     Diabetes Maternal Grandmother     Hypertension Father      Social History     Socioeconomic History    Marital status:      Spouse name: Not on file    Number of children: Not on file    Years of education: Not on file    Highest education level: Not on file   Occupational History    Not on file   Social Needs    Financial resource strain: Not on file    Food insecurity:     Worry: Not on file     Inability: Not on file    Transportation needs:     Medical: Not on file     Non-medical: Not on file   Tobacco Use    Smoking status: Former Smoker     Packs/day: 1.00     Years: 30.00     Pack years: 30.00     Types: Cigarettes     Last attempt to quit: 1983     Years since quittin.3    Smokeless tobacco: Never Used   Substance and Sexual Activity    Alcohol use: No    Drug use: No    Sexual activity: Not on file   Lifestyle    Physical activity:     Days per week: Not on file     Minutes per session: Not on file    Stress: Not on file   Relationships    Social connections:     Talks on phone: Not on file     Gets together: Not on file     Attends Hindu  service: Not on file     Active member of club or organization: Not on file     Attends meetings of clubs or organizations: Not on file     Relationship status: Not on file   Other Topics Concern    Not on file   Social History Narrative    Not on file     Past Surgical History:   Procedure Laterality Date    APPENDECTOMY      BACK SURGERY      x2    CARDIAC CATHETERIZATION  2016    CATARACT EXTRACTION      CHOLECYSTECTOMY      CORONARY ANGIOPLASTY  09/2015    non-obstructive CAD    EYE SURGERY      HEART CATH-LEFT Left 9/21/2015    Per[facility] by [facility] at [facility] CATH LAB    HIP SURGERY Bilateral     HYSTERECTOMY      KNEE SURGERY Bilateral     x 2 each       Labs:  Lab Results   Component Value Date    WBC 9.40 04/10/2019    HGB 14.7 04/10/2019    HCT 46.7 04/10/2019    MCV 93 04/10/2019     04/10/2019     BMP  Lab Results   Component Value Date     04/10/2019    K 4.4 04/10/2019     04/10/2019    CO2 30 (H) 04/10/2019    BUN 19 04/10/2019    CREATININE 0.8 04/10/2019    CALCIUM 9.6 04/10/2019    ANIONGAP 6 (L) 04/10/2019    ESTGFRAFRICA >60.0 04/10/2019    EGFRNONAA >60.0 04/10/2019     Lab Results   Component Value Date    ALT 16 04/10/2019    AST 24 04/10/2019    ALKPHOS 129 04/10/2019    BILITOT 1.1 (H) 04/10/2019       Lab Results   Component Value Date    IRON 69 03/14/2007    FERRITIN 214.3 (H) 03/14/2007     Lab Results   Component Value Date    QIPZJHUJ89 574 02/25/2015     Lab Results   Component Value Date    FOLATE 16.8 03/26/2015     Lab Results   Component Value Date    TSH 0.012 (L) 09/28/2018         Review of Systems   Constitutional: Positive for activity change, appetite change and fatigue. Negative for chills, diaphoresis, fever and unexpected weight change.   HENT: Negative for congestion, dental problem, drooling, ear discharge, ear pain, facial swelling, hearing loss, mouth sores, nosebleeds, postnasal drip, rhinorrhea, sinus pressure, sneezing, sore  throat, tinnitus, trouble swallowing and voice change.    Eyes: Negative for photophobia, pain, discharge, redness, itching and visual disturbance.   Respiratory: Negative for cough, choking, chest tightness, shortness of breath, wheezing and stridor.    Cardiovascular: Negative for chest pain, palpitations and leg swelling.   Gastrointestinal: Negative for abdominal distention, abdominal pain, anal bleeding, blood in stool, constipation, diarrhea, nausea, rectal pain and vomiting.   Endocrine: Negative for cold intolerance, heat intolerance, polydipsia, polyphagia and polyuria.   Genitourinary: Negative for decreased urine volume, difficulty urinating, dyspareunia, dysuria, enuresis, flank pain, frequency, genital sores, hematuria, menstrual problem, pelvic pain, urgency, vaginal bleeding, vaginal discharge and vaginal pain.   Musculoskeletal: Positive for gait problem. Negative for arthralgias, back pain, joint swelling, myalgias, neck pain and neck stiffness.   Skin: Negative for color change, pallor and rash.   Allergic/Immunologic: Negative for environmental allergies, food allergies and immunocompromised state.   Neurological: Positive for weakness. Negative for dizziness, tremors, seizures, syncope, facial asymmetry, speech difficulty, light-headedness, numbness and headaches.   Hematological: Negative for adenopathy. Does not bruise/bleed easily.   Psychiatric/Behavioral: Positive for dysphoric mood. Negative for agitation, behavioral problems, confusion, decreased concentration, hallucinations, self-injury, sleep disturbance and suicidal ideas. The patient is nervous/anxious. The patient is not hyperactive.        Objective:      Physical Exam   Constitutional: She is oriented to person, place, and time. She appears cachectic. She has a sickly appearance. She appears ill. She appears distressed.   HENT:   Head: Normocephalic and atraumatic.   Right Ear: External ear normal.   Left Ear: External ear normal.    Nose: Nose normal. Right sinus exhibits no maxillary sinus tenderness and no frontal sinus tenderness. Left sinus exhibits no maxillary sinus tenderness and no frontal sinus tenderness.   Mouth/Throat: Oropharynx is clear and moist. No oropharyngeal exudate.   Eyes: Pupils are equal, round, and reactive to light. Conjunctivae, EOM and lids are normal. Right eye exhibits no discharge. Left eye exhibits no discharge. Right conjunctiva is not injected. Right conjunctiva has no hemorrhage. Left conjunctiva is not injected. Left conjunctiva has no hemorrhage. No scleral icterus.   Neck: Normal range of motion. Neck supple. No JVD present. No tracheal deviation present. No thyromegaly present.   Cardiovascular: Normal rate and regular rhythm.   Pulmonary/Chest: Effort normal. No stridor. No respiratory distress. She exhibits no tenderness.   Abdominal: Soft. She exhibits no distension and no mass. There is no splenomegaly or hepatomegaly. There is no tenderness. There is no rebound.   Musculoskeletal: Normal range of motion. She exhibits no edema or tenderness.   Lymphadenopathy:     She has no cervical adenopathy.     She has no axillary adenopathy.        Right: No supraclavicular adenopathy present.        Left: No supraclavicular adenopathy present.   Neurological: She is alert and oriented to person, place, and time. No cranial nerve deficit. Coordination abnormal.   Skin: Skin is dry. No rash noted. She is not diaphoretic. No erythema.   Psychiatric: Her behavior is normal. Judgment and thought content normal. Her mood appears anxious. She exhibits a depressed mood.   Vitals reviewed.          Assessment:      1. Non-small cell cancer of left lung    2. Neoplasm of abdomen           Plan:     Extensive conversation with the family over whether not proceed to proceed with any further workup PET scan demonstrates PET finding in left chest consistent with malignancy  of infiltrative process in liver into proceed with  MRI of liver to determine if metastatic disease.  Plan for tissue confirmation on 05/09/2019 answered questions concerning whether not referral to hospice at this time with her multi is a multiplicity of other problems and the fact that she has a high likelihood malignancy were entertained answer questions referral made to [facility] also palliative care consultation.  I would not proceed with biopsy if the family states that they would not want to treat even with radiation therapy but they need to make this decision in the meantime of answer questions 40 min face-to-face time coordination of care I will see the patient back after they have been seen and evaluated by hospice if they proceed with bronchoscopic evaluation will see back the week of 05/13/2019 for review        Fracisco Paredes Jr, MD FACP

## 2019-05-06 ENCOUNTER — PATIENT MESSAGE (OUTPATIENT)
Dept: HEMATOLOGY/ONCOLOGY | Facility: CLINIC | Age: 84
End: 2019-05-06

## 2019-05-06 ENCOUNTER — TELEPHONE (OUTPATIENT)
Dept: PULMONOLOGY | Facility: CLINIC | Age: 84
End: 2019-05-06

## 2019-05-06 ENCOUNTER — TELEPHONE (OUTPATIENT)
Dept: HEMATOLOGY/ONCOLOGY | Facility: CLINIC | Age: 84
End: 2019-05-06

## 2019-05-06 DIAGNOSIS — R79.1 ABNORMAL COAGULATION PROFILE: Primary | ICD-10-CM

## 2019-05-06 NOTE — TELEPHONE ENCOUNTER
Hospice referral faxed to [facility] at [phone] per Dr. Paredes.

## 2019-05-06 NOTE — TELEPHONE ENCOUNTER
Spoke to patient's son.  Patient might go with hospice.  I informed him that if they decide to proceed with EBUS, I need to repeat the INR the morning of the procedure.

## 2019-05-07 RX ORDER — OMEPRAZOLE 40 MG/1
40 CAPSULE, DELAYED RELEASE ORAL DAILY
Qty: 90 CAPSULE | Refills: 1 | Status: SHIPPED | OUTPATIENT
Start: 2019-05-07

## 2019-05-09 ENCOUNTER — ANTI-COAG VISIT (OUTPATIENT)
Dept: CARDIOLOGY | Facility: CLINIC | Age: 84
End: 2019-05-09
Payer: MEDICARE

## 2019-05-09 DIAGNOSIS — Z79.01 LONG TERM (CURRENT) USE OF ANTICOAGULANTS: ICD-10-CM

## 2019-05-09 LAB — INR PPP: 1.9

## 2019-05-09 PROCEDURE — 93793 PR ANTICOAGULANT MGMT FOR PT TAKING WARFARIN: ICD-10-PCS | Mod: S$GLB,,,

## 2019-05-09 PROCEDURE — 93793 ANTICOAG MGMT PT WARFARIN: CPT | Mod: S$GLB,,,

## 2019-05-09 NOTE — PROGRESS NOTES
Verbal result taken from [place] with [facility].  PT/INR 19.0 / 1.9.   Date drawn 5/9/19.  Mrs. Cryer began receiving in home hospice care on Monday, 5/6/19.  Hospice nursing director advised patient to continue coumadin therapy.  PT/INR will be checked by hospice nurse and reported to coumadin clinic for management.  Orders given: Increase dose of coumadin to 2.5mg every evening.  Recheck INR in 1 week.

## 2019-05-13 RX ORDER — LEVOTHYROXINE SODIUM 125 UG/1
TABLET ORAL
Qty: 30 TABLET | Refills: 0 | OUTPATIENT
Start: 2019-05-13

## 2019-05-16 ENCOUNTER — TELEPHONE (OUTPATIENT)
Dept: FAMILY MEDICINE | Facility: CLINIC | Age: 84
End: 2019-05-16

## 2019-05-16 LAB — INR PPP: 3.7

## 2019-05-16 NOTE — TELEPHONE ENCOUNTER
----- Message from Marcel Vick sent at 5/16/2019  3:32 PM CDT -----  Contact: Azra ( [facility])   Called in the report for the inr.   Pt's Inr 3.7.         [phone]

## 2019-05-17 ENCOUNTER — ANTI-COAG VISIT (OUTPATIENT)
Dept: CARDIOLOGY | Facility: CLINIC | Age: 84
End: 2019-05-17
Payer: MEDICARE

## 2019-05-17 DIAGNOSIS — I48.20 CHRONIC ATRIAL FIBRILLATION: ICD-10-CM

## 2019-05-17 DIAGNOSIS — Z79.01 LONG TERM (CURRENT) USE OF ANTICOAGULANTS: ICD-10-CM

## 2019-05-17 PROCEDURE — 93793 PR ANTICOAGULANT MGMT FOR PT TAKING WARFARIN: ICD-10-PCS | Mod: GW,S$GLB,,

## 2019-05-17 PROCEDURE — 93793 ANTICOAG MGMT PT WARFARIN: CPT | Mod: GW,S$GLB,,

## 2019-05-17 NOTE — PROGRESS NOTES
Verbal result taken from Azra with [facility].  INR  3.7.   Date drawn 5/17/19.  No significant changes reported.  Orders given: Hold coumadin on today; then resume current dose of 2.5mg every evening.  Recheck INR on 5/23/19.    Coumadin clinic contact information provided to Jossue Azra.

## 2019-05-23 ENCOUNTER — PATIENT MESSAGE (OUTPATIENT)
Dept: FAMILY MEDICINE | Facility: CLINIC | Age: 84
End: 2019-05-23

## 2019-05-23 RX ORDER — LEVOTHYROXINE SODIUM 125 UG/1
TABLET ORAL
Qty: 30 TABLET | Refills: 0 | OUTPATIENT
Start: 2019-05-23

## 2019-05-30 ENCOUNTER — ANTI-COAG VISIT (OUTPATIENT)
Dept: CARDIOLOGY | Facility: CLINIC | Age: 84
End: 2019-05-30
Payer: MEDICARE

## 2019-05-30 DIAGNOSIS — Z79.01 LONG TERM (CURRENT) USE OF ANTICOAGULANTS: ICD-10-CM

## 2019-05-30 DIAGNOSIS — I48.20 CHRONIC ATRIAL FIBRILLATION: ICD-10-CM

## 2019-05-30 LAB — INR PPP: 5.3

## 2019-05-30 PROCEDURE — 93793 PR ANTICOAGULANT MGMT FOR PT TAKING WARFARIN: ICD-10-PCS | Mod: GW,S$GLB,,

## 2019-05-30 PROCEDURE — 93793 ANTICOAG MGMT PT WARFARIN: CPT | Mod: GW,S$GLB,,

## 2019-05-30 NOTE — PROGRESS NOTES
Verbal result taken from [facility] with [facility].  INR  5.3.   Date drawn 5/30/19.  No medication or diet changes reported.  No signs of bleeding noted.  Orders given: Hold coumadin on today and tomorrow; then resume current dose of 2.5mg every evening.  Recheck INR on Monday, 06/03/19.

## 2019-06-03 ENCOUNTER — ANTI-COAG VISIT (OUTPATIENT)
Dept: CARDIOLOGY | Facility: CLINIC | Age: 84
End: 2019-06-03
Payer: MEDICARE

## 2019-06-03 DIAGNOSIS — I48.20 CHRONIC ATRIAL FIBRILLATION: ICD-10-CM

## 2019-06-03 DIAGNOSIS — Z79.01 LONG TERM (CURRENT) USE OF ANTICOAGULANTS: ICD-10-CM

## 2019-06-03 LAB — INR PPP: 4.9

## 2019-06-03 PROCEDURE — 93793 ANTICOAG MGMT PT WARFARIN: CPT | Mod: GW,S$GLB,,

## 2019-06-03 PROCEDURE — 93793 PR ANTICOAGULANT MGMT FOR PT TAKING WARFARIN: ICD-10-PCS | Mod: GW,S$GLB,,

## 2019-06-03 RX ORDER — DONEPEZIL HYDROCHLORIDE 5 MG/1
TABLET, FILM COATED ORAL
Qty: 90 TABLET | Refills: 0 | Status: SHIPPED | OUTPATIENT
Start: 2019-06-03 | End: 2019-09-03 | Stop reason: SDUPTHER

## 2019-06-03 NOTE — PROGRESS NOTES
Verbal result taken from [facility] with [facility].  INR  4.9.   Date drawn 06/03/19.  No medication or diet changes reported.  No signs of bleeding noted.  Orders given: Hold coumadin on today and tomorrow; then resume current dose of 2.5mg every evening.  Recheck INR on Thursday, 06/06/19.

## 2019-06-06 ENCOUNTER — ANTI-COAG VISIT (OUTPATIENT)
Dept: CARDIOLOGY | Facility: CLINIC | Age: 84
End: 2019-06-06
Payer: MEDICARE

## 2019-06-06 DIAGNOSIS — I48.20 CHRONIC ATRIAL FIBRILLATION: ICD-10-CM

## 2019-06-06 DIAGNOSIS — Z79.01 LONG TERM (CURRENT) USE OF ANTICOAGULANTS: ICD-10-CM

## 2019-06-06 LAB — INR PPP: 1.4

## 2019-06-06 PROCEDURE — 93793 PR ANTICOAGULANT MGMT FOR PT TAKING WARFARIN: ICD-10-PCS | Mod: GW,S$GLB,,

## 2019-06-06 PROCEDURE — 93793 ANTICOAG MGMT PT WARFARIN: CPT | Mod: GW,S$GLB,,

## 2019-06-06 NOTE — PROGRESS NOTES
Verbal result taken from [facility] with [facility].  INR  1.4.   Date drawn 06/06/19.  Coumadin was held x 2 due to unexplained elevated INR readings.  No signs of bleeding noted.  Orders given: Lower dose of coumadin to 2.5mg daily except on Sunday.  Patient will not take coumadin on Sundays.  Recheck, Monday 6/10/19.

## 2019-06-10 ENCOUNTER — ANTI-COAG VISIT (OUTPATIENT)
Dept: CARDIOLOGY | Facility: CLINIC | Age: 84
End: 2019-06-10
Payer: MEDICARE

## 2019-06-10 DIAGNOSIS — I48.20 CHRONIC ATRIAL FIBRILLATION: ICD-10-CM

## 2019-06-10 DIAGNOSIS — Z79.01 LONG TERM (CURRENT) USE OF ANTICOAGULANTS: ICD-10-CM

## 2019-06-10 LAB — INR PPP: 1.9

## 2019-06-10 PROCEDURE — 93793 PR ANTICOAGULANT MGMT FOR PT TAKING WARFARIN: ICD-10-PCS | Mod: GW,S$GLB,,

## 2019-06-10 PROCEDURE — 93793 ANTICOAG MGMT PT WARFARIN: CPT | Mod: GW,S$GLB,,

## 2019-06-10 NOTE — PROGRESS NOTES
Verbal result taken from [facility] with [facility].  INR  1.9.   Date drawn 06/1019.  Patient was able to follow all dosing instructions.   Orders given: Re-challenge current dose of coumadin 2.5mg daily except on Sunday.  Patient will not take coumadin on Sundays.  Recheck on Thursday, 6/13/19.

## 2019-06-13 ENCOUNTER — ANTI-COAG VISIT (OUTPATIENT)
Dept: CARDIOLOGY | Facility: CLINIC | Age: 84
End: 2019-06-13
Payer: MEDICARE

## 2019-06-13 DIAGNOSIS — I48.20 CHRONIC ATRIAL FIBRILLATION: ICD-10-CM

## 2019-06-13 DIAGNOSIS — Z79.01 LONG TERM (CURRENT) USE OF ANTICOAGULANTS: ICD-10-CM

## 2019-06-13 LAB — INR PPP: 2

## 2019-06-13 PROCEDURE — 93793 PR ANTICOAGULANT MGMT FOR PT TAKING WARFARIN: ICD-10-PCS | Mod: GW,S$GLB,,

## 2019-06-13 PROCEDURE — 93793 ANTICOAG MGMT PT WARFARIN: CPT | Mod: GW,S$GLB,,

## 2019-06-13 NOTE — PROGRESS NOTES
Verbal result taken from [facility] with [facility].  INR  2.0.   Date drawn 06/13/19.  No changes reported.  Orders given: Continue current dose of coumadin 2.5mg daily except on Sunday.  Patient will not take coumadin on Sundays.  Recheck on Monday, 6/17/19.

## 2019-06-14 RX ORDER — CITALOPRAM 20 MG/1
TABLET, FILM COATED ORAL
Qty: 90 TABLET | Refills: 0 | Status: SHIPPED | OUTPATIENT
Start: 2019-06-14 | End: 2019-09-12 | Stop reason: SDUPTHER

## 2019-06-17 ENCOUNTER — ANTI-COAG VISIT (OUTPATIENT)
Dept: CARDIOLOGY | Facility: CLINIC | Age: 84
End: 2019-06-17
Payer: MEDICARE

## 2019-06-17 DIAGNOSIS — I48.20 CHRONIC ATRIAL FIBRILLATION: ICD-10-CM

## 2019-06-17 DIAGNOSIS — Z79.01 LONG TERM (CURRENT) USE OF ANTICOAGULANTS: ICD-10-CM

## 2019-06-17 LAB — INR PPP: 2.3

## 2019-06-17 PROCEDURE — 93793 ANTICOAG MGMT PT WARFARIN: CPT | Mod: GW,S$GLB,,

## 2019-06-17 PROCEDURE — 93793 PR ANTICOAGULANT MGMT FOR PT TAKING WARFARIN: ICD-10-PCS | Mod: GW,S$GLB,,

## 2019-06-17 NOTE — PROGRESS NOTES
Verbal result taken from [facility] with [facility].  INR  2.3.   Date drawn 06/17/19.  No changes reported.  Orders given: Continue current dose of coumadin 2.5mg daily except on Sunday.  Patient will not take coumadin on Sundays.  Recheck on Thursday, 6/20/19.

## 2019-06-20 ENCOUNTER — ANTI-COAG VISIT (OUTPATIENT)
Dept: CARDIOLOGY | Facility: CLINIC | Age: 84
End: 2019-06-20
Payer: MEDICARE

## 2019-06-20 DIAGNOSIS — I48.20 CHRONIC ATRIAL FIBRILLATION: ICD-10-CM

## 2019-06-20 DIAGNOSIS — Z79.01 LONG TERM (CURRENT) USE OF ANTICOAGULANTS: ICD-10-CM

## 2019-06-20 LAB — INR PPP: 2.5

## 2019-06-20 PROCEDURE — 93793 ANTICOAG MGMT PT WARFARIN: CPT | Mod: GW,S$GLB,,

## 2019-06-20 PROCEDURE — 93793 PR ANTICOAGULANT MGMT FOR PT TAKING WARFARIN: ICD-10-PCS | Mod: GW,S$GLB,,

## 2019-06-20 NOTE — PROGRESS NOTES
Verbal result taken from [facility] with [facility].  INR  2.5.   Date drawn 06/20/19.  No changes reported.  Orders given: Continue current dose of coumadin 2.5mg daily except on Sunday.  Patient will not take coumadin on Sundays.  Recheck on Thursday, 6/27/19.

## 2019-06-24 RX ORDER — ATORVASTATIN CALCIUM 40 MG/1
40 TABLET, FILM COATED ORAL DAILY
Qty: 90 TABLET | Refills: 0 | Status: SHIPPED | OUTPATIENT
Start: 2019-06-24 | End: 2019-08-23 | Stop reason: SDUPTHER

## 2019-06-24 RX ORDER — ATORVASTATIN CALCIUM 40 MG/1
TABLET, FILM COATED ORAL
Qty: 90 TABLET | Refills: 0 | OUTPATIENT
Start: 2019-06-24

## 2019-06-27 ENCOUNTER — ANTI-COAG VISIT (OUTPATIENT)
Dept: CARDIOLOGY | Facility: CLINIC | Age: 84
End: 2019-06-27
Payer: MEDICARE

## 2019-06-27 DIAGNOSIS — I48.20 CHRONIC ATRIAL FIBRILLATION: ICD-10-CM

## 2019-06-27 DIAGNOSIS — Z79.01 LONG TERM (CURRENT) USE OF ANTICOAGULANTS: ICD-10-CM

## 2019-06-27 LAB — INR PPP: 1.8

## 2019-06-27 PROCEDURE — 93793 ANTICOAG MGMT PT WARFARIN: CPT | Mod: GW,S$GLB,,

## 2019-06-27 PROCEDURE — 93793 PR ANTICOAGULANT MGMT FOR PT TAKING WARFARIN: ICD-10-PCS | Mod: GW,S$GLB,,

## 2019-06-27 NOTE — PROGRESS NOTES
Verbal result taken from [facility] with [facility].  INR  1.8.   Date drawn 06/27/19.  Patient was able to follow all dosing instructions.   Orders given: Re-challenge current dose of coumadin 2.5mg daily except on Sunday.  Patient will not take coumadin on Sundays.  Recheck on Tuesday, 7/2/19.

## 2019-07-02 ENCOUNTER — ANTI-COAG VISIT (OUTPATIENT)
Dept: CARDIOLOGY | Facility: CLINIC | Age: 84
End: 2019-07-02
Payer: MEDICARE

## 2019-07-02 DIAGNOSIS — I48.20 CHRONIC ATRIAL FIBRILLATION: ICD-10-CM

## 2019-07-02 DIAGNOSIS — Z79.01 LONG TERM (CURRENT) USE OF ANTICOAGULANTS: ICD-10-CM

## 2019-07-02 LAB — INR PPP: 2.4

## 2019-07-02 PROCEDURE — 93793 PR ANTICOAGULANT MGMT FOR PT TAKING WARFARIN: ICD-10-PCS | Mod: GW,S$GLB,,

## 2019-07-02 PROCEDURE — 93793 ANTICOAG MGMT PT WARFARIN: CPT | Mod: GW,S$GLB,,

## 2019-07-02 NOTE — PROGRESS NOTES
Verbal result taken from [facility] with [facility].  INR  2.4.   Date drawn 7/02/19.  Patient was able to follow all dosing instructions.   Orders given: Resume current dose of coumadin 2.5mg daily except on Sunday.  Patient will not take coumadin on Sundays.  Recheck on Tuesday, 7/9/19.

## 2019-07-09 ENCOUNTER — ANTI-COAG VISIT (OUTPATIENT)
Dept: CARDIOLOGY | Facility: CLINIC | Age: 84
End: 2019-07-09
Payer: MEDICARE

## 2019-07-09 DIAGNOSIS — Z79.01 LONG TERM (CURRENT) USE OF ANTICOAGULANTS: ICD-10-CM

## 2019-07-09 DIAGNOSIS — I48.20 CHRONIC ATRIAL FIBRILLATION: ICD-10-CM

## 2019-07-09 LAB — INR PPP: 2.2

## 2019-07-09 PROCEDURE — 93793 PR ANTICOAGULANT MGMT FOR PT TAKING WARFARIN: ICD-10-PCS | Mod: GW,S$GLB,,

## 2019-07-09 PROCEDURE — 93793 ANTICOAG MGMT PT WARFARIN: CPT | Mod: GW,S$GLB,,

## 2019-07-09 NOTE — PROGRESS NOTES
Verbal result taken from [facility] with [facility].  INR  2.2.   Date drawn 7/09/19.  Patient was able to follow all dosing instructions.   Orders given: Resume current dose of coumadin 2.5mg daily except on Sunday.  Patient will not take coumadin on Sundays.  Recheck on Tuesday, 7/16/19

## 2019-07-12 DIAGNOSIS — I10 ESSENTIAL HYPERTENSION: Primary | ICD-10-CM

## 2019-07-12 RX ORDER — FUROSEMIDE 20 MG/1
20 TABLET ORAL DAILY PRN
Qty: 60 TABLET | Refills: 3 | Status: SHIPPED | OUTPATIENT
Start: 2019-07-12 | End: 2020-07-11

## 2019-07-19 ENCOUNTER — ANTI-COAG VISIT (OUTPATIENT)
Dept: CARDIOLOGY | Facility: CLINIC | Age: 84
End: 2019-07-19
Payer: MEDICARE

## 2019-07-19 DIAGNOSIS — Z79.01 LONG TERM (CURRENT) USE OF ANTICOAGULANTS: ICD-10-CM

## 2019-07-19 DIAGNOSIS — I48.20 CHRONIC ATRIAL FIBRILLATION: ICD-10-CM

## 2019-07-19 LAB — INR PPP: 2.2

## 2019-07-19 PROCEDURE — 93793 PR ANTICOAGULANT MGMT FOR PT TAKING WARFARIN: ICD-10-PCS | Mod: GW,S$GLB,,

## 2019-07-19 PROCEDURE — 93793 ANTICOAG MGMT PT WARFARIN: CPT | Mod: GW,S$GLB,,

## 2019-07-19 NOTE — PROGRESS NOTES
Verbal result taken from [facility].  INR  2.2 / PT 26.1.   Date drawn 7/19/19.  Patient was able to follow all dosing instructions.   Orders given: Resume current dose of coumadin 2.5mg daily except on Sunday.  Patient will not take coumadin on Sundays.  Recheck on Friday, 7/26/19.

## 2019-07-26 ENCOUNTER — ANTI-COAG VISIT (OUTPATIENT)
Dept: CARDIOLOGY | Facility: CLINIC | Age: 84
End: 2019-07-26
Payer: MEDICARE

## 2019-07-26 DIAGNOSIS — I48.20 CHRONIC ATRIAL FIBRILLATION: ICD-10-CM

## 2019-07-26 DIAGNOSIS — Z79.01 LONG TERM (CURRENT) USE OF ANTICOAGULANTS: ICD-10-CM

## 2019-07-26 LAB — INR PPP: 3

## 2019-07-26 PROCEDURE — 93793 PR ANTICOAGULANT MGMT FOR PT TAKING WARFARIN: ICD-10-PCS | Mod: GW,S$GLB,,

## 2019-07-26 PROCEDURE — 93793 ANTICOAG MGMT PT WARFARIN: CPT | Mod: GW,S$GLB,,

## 2019-07-26 NOTE — PROGRESS NOTES
Verbal results taken from [facility].  PT:  36.0 // INR: 3.0.  Reports no recent changes.  Patient has taken medication as previously instructed.  Orders given:  Maintain current dose of Warfarin 2.5 mg Monday through Saturday//None on Sunday.  Recheck on Friday, 8/02/2019.  Fax orders to follow.

## 2019-07-29 RX ORDER — METOPROLOL SUCCINATE 100 MG/1
TABLET, EXTENDED RELEASE ORAL
Qty: 180 TABLET | Refills: 0 | Status: SHIPPED | OUTPATIENT
Start: 2019-07-29 | End: 2019-12-26 | Stop reason: SDUPTHER

## 2019-07-31 DIAGNOSIS — I48.20 CHRONIC ATRIAL FIBRILLATION: Chronic | ICD-10-CM

## 2019-07-31 RX ORDER — WARFARIN SODIUM 5 MG/1
TABLET ORAL
Qty: 30 TABLET | Refills: 3 | Status: SHIPPED | OUTPATIENT
Start: 2019-07-31 | End: 2019-08-23 | Stop reason: DRUGHIGH

## 2019-08-02 ENCOUNTER — ANTI-COAG VISIT (OUTPATIENT)
Dept: CARDIOLOGY | Facility: CLINIC | Age: 84
End: 2019-08-02
Payer: MEDICARE

## 2019-08-02 DIAGNOSIS — I48.20 CHRONIC ATRIAL FIBRILLATION: ICD-10-CM

## 2019-08-02 DIAGNOSIS — Z79.01 LONG TERM (CURRENT) USE OF ANTICOAGULANTS: ICD-10-CM

## 2019-08-02 LAB — INR PPP: 3.1

## 2019-08-02 PROCEDURE — 93793 ANTICOAG MGMT PT WARFARIN: CPT | Mod: GW,S$GLB,,

## 2019-08-02 PROCEDURE — 93793 PR ANTICOAGULANT MGMT FOR PT TAKING WARFARIN: ICD-10-PCS | Mod: GW,S$GLB,,

## 2019-08-02 NOTE — PROGRESS NOTES
Verbal result taken from [facility].  INR  3.1 / PT 37.5.   Date drawn 08/02/19.  Patient was able to follow all dosing instructions. Only reported change, patient did not eat her breakfast this morning.  Orders given: Lower dose of warfarin to 2.5mg daily except on Saturdays and Sundays.  Patient will not take coumadin on Saturdays and Sundays.  Recheck on Friday, 8/09/19.

## 2019-08-09 ENCOUNTER — ANTI-COAG VISIT (OUTPATIENT)
Dept: CARDIOLOGY | Facility: CLINIC | Age: 84
End: 2019-08-09
Payer: MEDICARE

## 2019-08-09 DIAGNOSIS — I48.20 CHRONIC ATRIAL FIBRILLATION: ICD-10-CM

## 2019-08-09 DIAGNOSIS — Z79.01 LONG TERM (CURRENT) USE OF ANTICOAGULANTS: ICD-10-CM

## 2019-08-09 LAB — INR PPP: 2.6

## 2019-08-09 PROCEDURE — 93793 PR ANTICOAGULANT MGMT FOR PT TAKING WARFARIN: ICD-10-PCS | Mod: GW,S$GLB,,

## 2019-08-09 PROCEDURE — 93793 ANTICOAG MGMT PT WARFARIN: CPT | Mod: GW,S$GLB,,

## 2019-08-09 NOTE — PROGRESS NOTES
Verbal result taken from [facility].  INR 2.6 / PT 31.3.   Date drawn 08/09/19.  Patient was able to follow all dosing instructions. No changes reported.  Orders given: Resume current dose of warfarin 2.5mg daily except on Saturdays and Sundays.  Patient will not take coumadin on Saturdays and Sundays.  Recheck on Thursday , 8/15/19.

## 2019-08-16 ENCOUNTER — ANTI-COAG VISIT (OUTPATIENT)
Dept: CARDIOLOGY | Facility: CLINIC | Age: 84
End: 2019-08-16
Payer: MEDICARE

## 2019-08-16 DIAGNOSIS — I48.20 CHRONIC ATRIAL FIBRILLATION: ICD-10-CM

## 2019-08-16 DIAGNOSIS — Z79.01 LONG TERM (CURRENT) USE OF ANTICOAGULANTS: ICD-10-CM

## 2019-08-16 LAB — INR PPP: 4.8

## 2019-08-16 PROCEDURE — 93793 ANTICOAG MGMT PT WARFARIN: CPT | Mod: GW,S$GLB,,

## 2019-08-16 PROCEDURE — 93793 PR ANTICOAGULANT MGMT FOR PT TAKING WARFARIN: ICD-10-PCS | Mod: GW,S$GLB,,

## 2019-08-16 NOTE — PROGRESS NOTES
Verbal result taken from [facility].  INR 4.8 / PT 57.9.   Date drawn 08/16/19.  Patient was able to follow all dosing instructions, but patient's appetite has decreased.   Orders given: Hold dose of warfarin on today; then resume current dose of warfarin 2.5mg daily except on Saturdays and Sundays.  Patient will not take coumadin on Saturdays and Sundays.  Recheck on Thursday , 8/22/19.

## 2019-08-23 ENCOUNTER — ANTI-COAG VISIT (OUTPATIENT)
Dept: CARDIOLOGY | Facility: CLINIC | Age: 84
End: 2019-08-23
Payer: MEDICARE

## 2019-08-23 DIAGNOSIS — I48.20 CHRONIC ATRIAL FIBRILLATION: ICD-10-CM

## 2019-08-23 DIAGNOSIS — I10 ESSENTIAL HYPERTENSION: Primary | ICD-10-CM

## 2019-08-23 DIAGNOSIS — Z79.01 LONG TERM (CURRENT) USE OF ANTICOAGULANTS: Primary | ICD-10-CM

## 2019-08-23 LAB — INR PPP: 3.5

## 2019-08-23 PROCEDURE — 93793 ANTICOAG MGMT PT WARFARIN: CPT | Mod: GW,S$GLB,,

## 2019-08-23 PROCEDURE — 93793 PR ANTICOAGULANT MGMT FOR PT TAKING WARFARIN: ICD-10-PCS | Mod: GW,S$GLB,,

## 2019-08-23 RX ORDER — WARFARIN 2.5 MG/1
1.25 TABLET ORAL DAILY
Qty: 15 TABLET | Refills: 3 | Status: SHIPPED | OUTPATIENT
Start: 2019-08-23 | End: 2019-11-04 | Stop reason: SDUPTHER

## 2019-08-23 RX ORDER — ATORVASTATIN CALCIUM 40 MG/1
40 TABLET, FILM COATED ORAL DAILY
Qty: 90 TABLET | Refills: 3 | Status: SHIPPED | OUTPATIENT
Start: 2019-08-23 | End: 2019-12-26

## 2019-08-23 NOTE — PROGRESS NOTES
Verbal result taken from [facility].  INR 3.5 / PT 42.1.   Date drawn 08/23/19.  Patient was able to follow all dosing instructions.  No changes or sign of bleeding noted.  Dose of warfarin will be lowered.  Orders given: Take warfarin 1.25mg every evening.  New prescroption sent.  Mrs. Sin, daughter-in-law notified.   Recheck on Thursday , 8/29/19.

## 2019-08-30 LAB — INR PPP: 2.3

## 2019-09-03 ENCOUNTER — ANTI-COAG VISIT (OUTPATIENT)
Dept: CARDIOLOGY | Facility: CLINIC | Age: 84
End: 2019-09-03
Payer: MEDICARE

## 2019-09-03 DIAGNOSIS — Z79.01 LONG TERM (CURRENT) USE OF ANTICOAGULANTS: ICD-10-CM

## 2019-09-03 DIAGNOSIS — I48.20 CHRONIC ATRIAL FIBRILLATION: ICD-10-CM

## 2019-09-03 PROCEDURE — 93793 ANTICOAG MGMT PT WARFARIN: CPT | Mod: S$GLB,,,

## 2019-09-03 PROCEDURE — 93793 PR ANTICOAGULANT MGMT FOR PT TAKING WARFARIN: ICD-10-PCS | Mod: S$GLB,,,

## 2019-09-03 RX ORDER — DONEPEZIL HYDROCHLORIDE 5 MG/1
TABLET, FILM COATED ORAL
Qty: 90 TABLET | Refills: 0 | Status: SHIPPED | OUTPATIENT
Start: 2019-09-03

## 2019-09-03 NOTE — PROGRESS NOTES
Verbal results taken from [facility] at [phone].  INR: 28.1 / PT: 2.3.   Date drawn 08/30/2019.  Patient was able to follow all dosing instructions.  Orders given:  Maintain current dose of Warfarin 1.25 mg daily.  Recheck on Friday, 09/06/2019.  Fax (order) to follow.

## 2019-09-06 ENCOUNTER — ANTI-COAG VISIT (OUTPATIENT)
Dept: CARDIOLOGY | Facility: CLINIC | Age: 84
End: 2019-09-06
Payer: MEDICARE

## 2019-09-06 DIAGNOSIS — I48.20 CHRONIC ATRIAL FIBRILLATION: ICD-10-CM

## 2019-09-06 DIAGNOSIS — Z79.01 LONG TERM (CURRENT) USE OF ANTICOAGULANTS: ICD-10-CM

## 2019-09-06 LAB — INR PPP: 2.2

## 2019-09-06 PROCEDURE — 93793 PR ANTICOAGULANT MGMT FOR PT TAKING WARFARIN: ICD-10-PCS | Mod: GW,S$GLB,,

## 2019-09-06 PROCEDURE — 93793 ANTICOAG MGMT PT WARFARIN: CPT | Mod: GW,S$GLB,,

## 2019-09-06 NOTE — PROGRESS NOTES
Verbal results taken from [facility] at [phone].  INR: 26.8 / PT: 2.2.   Date drawn 9/06/2019.  Patient was able to follow dosing instructions.  Orders given:  Maintain current dose of Warfarin 1.25 mg daily.  Recheck on Friday, 09/20/2019.  Fax (order) to follow.

## 2019-09-12 RX ORDER — CITALOPRAM 20 MG/1
20 TABLET, FILM COATED ORAL DAILY
Qty: 90 TABLET | Refills: 0 | Status: SHIPPED | OUTPATIENT
Start: 2019-09-12

## 2019-09-20 ENCOUNTER — ANTI-COAG VISIT (OUTPATIENT)
Dept: CARDIOLOGY | Facility: CLINIC | Age: 84
End: 2019-09-20
Payer: MEDICARE

## 2019-09-20 DIAGNOSIS — Z79.01 LONG TERM (CURRENT) USE OF ANTICOAGULANTS: ICD-10-CM

## 2019-09-20 DIAGNOSIS — I48.20 CHRONIC ATRIAL FIBRILLATION: ICD-10-CM

## 2019-09-20 LAB — INR PPP: 2.7

## 2019-09-20 PROCEDURE — 93793 ANTICOAG MGMT PT WARFARIN: CPT | Mod: GW,S$GLB,,

## 2019-09-20 PROCEDURE — 93793 PR ANTICOAGULANT MGMT FOR PT TAKING WARFARIN: ICD-10-PCS | Mod: GW,S$GLB,,

## 2019-09-20 NOTE — PROGRESS NOTES
Verbal results taken from [facility] at [phone].  INR: 36.0 / PT: 2.7.   Date drawn 9/20/2019.  Patient was able to follow dosing instructions.  No other changes reported.  Orders given:  Maintain current dose of Warfarin 1.25 mg daily.  Recheck on Friday, 10/04/2019.  Fax (order) to follow.

## 2019-09-24 RX ORDER — CITALOPRAM 20 MG/1
TABLET, FILM COATED ORAL
Qty: 90 TABLET | Refills: 0 | OUTPATIENT
Start: 2019-09-24

## 2019-10-04 ENCOUNTER — ANTI-COAG VISIT (OUTPATIENT)
Dept: CARDIOLOGY | Facility: CLINIC | Age: 84
End: 2019-10-04
Payer: MEDICARE

## 2019-10-04 DIAGNOSIS — Z79.01 LONG TERM (CURRENT) USE OF ANTICOAGULANTS: ICD-10-CM

## 2019-10-04 DIAGNOSIS — I48.20 CHRONIC ATRIAL FIBRILLATION: ICD-10-CM

## 2019-10-04 LAB — INR PPP: 1.9

## 2019-10-04 PROCEDURE — 93793 ANTICOAG MGMT PT WARFARIN: CPT | Mod: GW,S$GLB,,

## 2019-10-04 PROCEDURE — 93793 PR ANTICOAGULANT MGMT FOR PT TAKING WARFARIN: ICD-10-PCS | Mod: GW,S$GLB,,

## 2019-10-04 NOTE — PROGRESS NOTES
Verbal results taken from [facility] at [phone].  INR: 23.4 / PT: 1.9.   Date drawn 9/20/2019.  Patient was able to follow dosing instructions.  No missed doses.  Orders given:  Decrease Vitamin K in diet.  Will boost today's Warfarin dose to 2.5 mg - only, then have patient to maintain current dose of Warfarin 1.25 mg daily.  Recheck on Friday, 10/18/2019.  Fax (order) to follow.

## 2019-10-18 ENCOUNTER — TELEPHONE (OUTPATIENT)
Dept: CARDIOLOGY | Facility: CLINIC | Age: 84
End: 2019-10-18

## 2019-10-18 LAB — INR PPP: 1.6

## 2019-10-18 NOTE — TELEPHONE ENCOUNTER
Caller: Kathe (Today,  1:54 PM)             Caller request a call back regarding pts PT iron results  .... Call back:  [phone]      Attempted a return call - N/A, left message to contact the Coumadin Clinic at [phone].

## 2019-10-22 ENCOUNTER — TELEPHONE (OUTPATIENT)
Dept: CARDIOLOGY | Facility: CLINIC | Age: 84
End: 2019-10-22

## 2019-10-22 ENCOUNTER — ANTI-COAG VISIT (OUTPATIENT)
Dept: CARDIOLOGY | Facility: CLINIC | Age: 84
End: 2019-10-22
Payer: MEDICARE

## 2019-10-22 DIAGNOSIS — I48.20 CHRONIC ATRIAL FIBRILLATION: ICD-10-CM

## 2019-10-22 DIAGNOSIS — Z79.01 LONG TERM (CURRENT) USE OF ANTICOAGULANTS: ICD-10-CM

## 2019-10-22 PROCEDURE — 93793 ANTICOAG MGMT PT WARFARIN: CPT | Mod: GW,S$GLB,,

## 2019-10-22 PROCEDURE — 93793 PR ANTICOAGULANT MGMT FOR PT TAKING WARFARIN: ICD-10-PCS | Mod: GW,S$GLB,,

## 2019-10-22 NOTE — PROGRESS NOTES
Verbal results taken from Kathe with [facility].  INR 1.6 / PT 18.0.   Date drawn 10/18/19.  Ms. Archuleta has been experiencing phone trouble, INR results received late.   Patient's INR continues to be sub-therapeutic.  She has been able to follow all previous instructions.  No significant changes or missed doses reported.  Orders given: Increase warfarin dose to 2.5mg on Tuesdays and 1.25mg on all other days of the week.  Recheck INR on Wednesday, 10/30/19.

## 2019-10-22 NOTE — TELEPHONE ENCOUNTER
Attempted to contact Ms. Archuleta at [facility] at [phone].  Message left. Please contact coumadin clinic regarding overdue results [phone].

## 2019-11-01 ENCOUNTER — ANTI-COAG VISIT (OUTPATIENT)
Dept: CARDIOLOGY | Facility: CLINIC | Age: 84
End: 2019-11-01
Payer: MEDICARE

## 2019-11-01 DIAGNOSIS — I48.20 CHRONIC ATRIAL FIBRILLATION: ICD-10-CM

## 2019-11-01 DIAGNOSIS — Z79.01 LONG TERM (CURRENT) USE OF ANTICOAGULANTS: ICD-10-CM

## 2019-11-01 LAB — INR PPP: 1.5

## 2019-11-01 PROCEDURE — 93793 ANTICOAG MGMT PT WARFARIN: CPT | Mod: GW,S$GLB,,

## 2019-11-01 PROCEDURE — 93793 PR ANTICOAGULANT MGMT FOR PT TAKING WARFARIN: ICD-10-PCS | Mod: GW,S$GLB,,

## 2019-11-01 NOTE — PROGRESS NOTES
Verbal results taken from [facility].  INR 1.5 / PT 17.7.   Date drawn 11/1/19.   Patient's INR continues to be sub-therapeutic.  She has been able to follow all previous instructions.  No significant changes or missed doses reported.  Orders given: Take warfarin 2.5mg on today; then resume current dose of warfarin dose 2.5mg on Tuesdays and 1.25mg on all other days of the week.  Recheck INR on Thursday, 11/07/19.

## 2019-11-04 DIAGNOSIS — Z79.01 LONG TERM (CURRENT) USE OF ANTICOAGULANTS: ICD-10-CM

## 2019-11-04 RX ORDER — WARFARIN 2.5 MG/1
1.25 TABLET ORAL DAILY
Qty: 90 TABLET | Refills: 3 | Status: SHIPPED | OUTPATIENT
Start: 2019-11-04 | End: 2020-11-03

## 2019-11-11 ENCOUNTER — ANTI-COAG VISIT (OUTPATIENT)
Dept: CARDIOLOGY | Facility: CLINIC | Age: 84
End: 2019-11-11
Payer: MEDICARE

## 2019-11-11 DIAGNOSIS — Z79.01 LONG TERM (CURRENT) USE OF ANTICOAGULANTS: ICD-10-CM

## 2019-11-11 DIAGNOSIS — I48.20 CHRONIC ATRIAL FIBRILLATION: ICD-10-CM

## 2019-11-11 LAB — INR PPP: 1.5

## 2019-11-11 PROCEDURE — 93793 ANTICOAG MGMT PT WARFARIN: CPT | Mod: GW,S$GLB,,

## 2019-11-11 PROCEDURE — 93793 PR ANTICOAGULANT MGMT FOR PT TAKING WARFARIN: ICD-10-PCS | Mod: GW,S$GLB,,

## 2019-11-11 NOTE — PROGRESS NOTES
Verbal results taken from [facility].  PT 18.0 / INR 1.5.   Date drawn 11/11/19.    nurse was unable to check INR on last week.  Patient's INR continues to be sub-therapeutic, will monitor patient closely.  She has been able to follow all previous instructions.  No significant changes or missed doses reported.  Orders given: Increase dose of warfarin to 2.5mg on Mondays and Wednesdays; and 1.25mg on all other days of the week.  Recheck INR on Thursday, 11/14/19.

## 2019-11-14 ENCOUNTER — ANTI-COAG VISIT (OUTPATIENT)
Dept: CARDIOLOGY | Facility: CLINIC | Age: 84
End: 2019-11-14
Payer: MEDICARE

## 2019-11-14 DIAGNOSIS — Z79.01 LONG TERM (CURRENT) USE OF ANTICOAGULANTS: ICD-10-CM

## 2019-11-14 DIAGNOSIS — I48.20 CHRONIC ATRIAL FIBRILLATION: ICD-10-CM

## 2019-11-14 LAB — INR PPP: 1.8

## 2019-11-14 PROCEDURE — 93793 ANTICOAG MGMT PT WARFARIN: CPT | Mod: GW,S$GLB,,

## 2019-11-14 PROCEDURE — 93793 PR ANTICOAGULANT MGMT FOR PT TAKING WARFARIN: ICD-10-PCS | Mod: GW,S$GLB,,

## 2019-11-14 NOTE — PROGRESS NOTES
Verbal results taken from [facility].  PT 19.0 / INR 1.8.   Date drawn 11/14/19.   Previous instructions followed.  INR has improved but remains sub-therapeutic.  No significant changes or missed doses reported.  Orders given: Increase dose of warfarin to 2.5mg on Mondays, Wednesdays and Fridays; and 1.25mg on all other days of the week.  Recheck INR on Thursday, 11/21/19.

## 2019-11-21 ENCOUNTER — ANTI-COAG VISIT (OUTPATIENT)
Dept: CARDIOLOGY | Facility: CLINIC | Age: 84
End: 2019-11-21
Payer: MEDICARE

## 2019-11-21 DIAGNOSIS — Z79.01 LONG TERM (CURRENT) USE OF ANTICOAGULANTS: ICD-10-CM

## 2019-11-21 DIAGNOSIS — I48.20 CHRONIC ATRIAL FIBRILLATION: ICD-10-CM

## 2019-11-21 LAB — INR PPP: 2.3

## 2019-11-21 PROCEDURE — 93793 ANTICOAG MGMT PT WARFARIN: CPT | Mod: GW,S$GLB,,

## 2019-11-21 PROCEDURE — 93793 PR ANTICOAGULANT MGMT FOR PT TAKING WARFARIN: ICD-10-PCS | Mod: GW,S$GLB,,

## 2019-11-21 NOTE — PROGRESS NOTES
Verbal results taken from [facility].  PT 27.6 / INR 2.3.   Date drawn 11/21/19.   Previous instructions followed.  INR now in range.  No changes reported.  Orders given: Resume current dose of warfarin 2.5mg on Mondays, Wednesdays and Fridays; and 1.25mg on all other days of the week.  Recheck INR on Tuesday, 12/3/19.

## 2019-11-27 RX ORDER — DONEPEZIL HYDROCHLORIDE 5 MG/1
TABLET, FILM COATED ORAL
Qty: 90 TABLET | Refills: 0 | Status: SHIPPED | OUTPATIENT
Start: 2019-11-27

## 2019-12-03 ENCOUNTER — ANTI-COAG VISIT (OUTPATIENT)
Dept: CARDIOLOGY | Facility: CLINIC | Age: 84
End: 2019-12-03
Payer: MEDICARE

## 2019-12-03 DIAGNOSIS — Z79.01 LONG TERM (CURRENT) USE OF ANTICOAGULANTS: ICD-10-CM

## 2019-12-03 DIAGNOSIS — I48.20 CHRONIC ATRIAL FIBRILLATION: ICD-10-CM

## 2019-12-03 LAB — INR PPP: 3.2

## 2019-12-03 PROCEDURE — 93793 PR ANTICOAGULANT MGMT FOR PT TAKING WARFARIN: ICD-10-PCS | Mod: S$GLB,,,

## 2019-12-03 PROCEDURE — 93793 ANTICOAG MGMT PT WARFARIN: CPT | Mod: S$GLB,,,

## 2019-12-03 NOTE — PROGRESS NOTES
Verbal results taken from [facility].  PT 38.5 / INR 3.2.   Date drawn 12/03/19.   Previous instructions followed.  INR slightly elevated.    No changes or signs of bleeding reported.  Orders given: Hold dose of warfarin on today; then resume current dose of warfarin 2.5mg on Mondays, Wednesdays and Fridays; and 1.25mg on all other days of the week.  Recheck INR on Tuesday, 12/10/19.

## 2019-12-10 ENCOUNTER — ANTI-COAG VISIT (OUTPATIENT)
Dept: CARDIOLOGY | Facility: CLINIC | Age: 84
End: 2019-12-10
Payer: MEDICARE

## 2019-12-10 DIAGNOSIS — I48.20 CHRONIC ATRIAL FIBRILLATION: ICD-10-CM

## 2019-12-10 DIAGNOSIS — Z79.01 LONG TERM (CURRENT) USE OF ANTICOAGULANTS: ICD-10-CM

## 2019-12-10 LAB — INR PPP: 2.8

## 2019-12-10 PROCEDURE — 93793 ANTICOAG MGMT PT WARFARIN: CPT | Mod: S$GLB,,,

## 2019-12-10 PROCEDURE — 93793 PR ANTICOAGULANT MGMT FOR PT TAKING WARFARIN: ICD-10-PCS | Mod: S$GLB,,,

## 2019-12-10 RX ORDER — CITALOPRAM 20 MG/1
TABLET, FILM COATED ORAL
Qty: 90 TABLET | Refills: 0 | OUTPATIENT
Start: 2019-12-10

## 2019-12-10 NOTE — PROGRESS NOTES
Verbal results taken from Kathe Henderson (nurse) at [phone] with [facility].  PT: 33.5  and  INR: 2.8 (therapeutic).   Date tested: 12/10/2019.  Previous instructions were followed.  Orders given:  Will rechallenge current dose of Warfarin 2.5 mg every Monday, Wednesday, and Friday; and 1.25 mg on all other days per week.  Recheck on Tuesday, 12/17/2019.

## 2019-12-18 ENCOUNTER — HOSPITAL ENCOUNTER (EMERGENCY)
Facility: HOSPITAL | Age: 84
Discharge: HOME | End: 2019-12-18
Attending: EMERGENCY MEDICINE
Payer: MEDICARE

## 2019-12-18 ENCOUNTER — TELEPHONE (OUTPATIENT)
Dept: CARDIOLOGY | Facility: CLINIC | Age: 84
End: 2019-12-18

## 2019-12-18 ENCOUNTER — ANTI-COAG VISIT (OUTPATIENT)
Dept: CARDIOLOGY | Facility: CLINIC | Age: 84
End: 2019-12-18
Payer: MEDICARE

## 2019-12-18 VITALS
OXYGEN SATURATION: 96 % | HEIGHT: 66 IN | TEMPERATURE: 98 F | RESPIRATION RATE: 28 BRPM | DIASTOLIC BLOOD PRESSURE: 63 MMHG | HEART RATE: 79 BPM | BODY MASS INDEX: 30 KG/M2 | SYSTOLIC BLOOD PRESSURE: 128 MMHG

## 2019-12-18 DIAGNOSIS — T45.511A COUMADIN TOXICITY, ACCIDENTAL OR UNINTENTIONAL, INITIAL ENCOUNTER: Primary | ICD-10-CM

## 2019-12-18 DIAGNOSIS — Z79.01 LONG TERM (CURRENT) USE OF ANTICOAGULANTS: ICD-10-CM

## 2019-12-18 DIAGNOSIS — I48.20 CHRONIC ATRIAL FIBRILLATION: ICD-10-CM

## 2019-12-18 PROCEDURE — 93793 ANTICOAG MGMT PT WARFARIN: CPT | Mod: GW,S$GLB,,

## 2019-12-18 PROCEDURE — 99282 EMERGENCY DEPT VISIT SF MDM: CPT | Mod: HCNC

## 2019-12-18 PROCEDURE — 93793 PR ANTICOAGULANT MGMT FOR PT TAKING WARFARIN: ICD-10-PCS | Mod: GW,S$GLB,,

## 2019-12-18 NOTE — ED PROVIDER NOTES
SCRIBE #1 NOTE: I, Pauline Barraza, am scribing for, and in the presence of, Edita Blackmon MD. I have scribed the entire note.       History     Chief Complaint   Patient presents with    Abnormal Labs     Dr. Lopez, cardiologist sent patient for abnormal labs; currently with [facility]     Review of patient's allergies indicates:   Allergen Reactions    Alendronate      Other reaction(s): Joint pain  Other reaction(s): Joint pain    Bacitracin     Nitrofurantoin macrocrystalline      Other reaction(s): Unknown    Sulfa (sulfonamide antibiotics)      Other reaction(s): Shortness of breath  Other reaction(s): Hives  Other reaction(s): Flushing (skin)  Other reaction(s): Edema  Other reaction(s): Shortness of breath  Other reaction(s): Hives  Other reaction(s): Flushing (skin)  Other reaction(s): Edema         History of Present Illness     HPI    12/18/2019, 5:54 PM  History obtained from the patient and family      History of Present Illness: Delores M Cryer is a 84 y.o. female patient with a PMHx of HTN, CHF, Afib- on Coumadin who presents to the Emergency Department for an abnormal lab. Family notes that patient had bloodwork completed 2 days ago and was told today that patient's Coumadin was 10. They state that they were called by the Coumadin clinic and was instructed to bring patient to the ED for evaluation. Patient is denying any sxs. She states that she is not bleeding from anywhere and also denies any recent trauma. She has not taken her Coumadin today. No other complaints or concerns at this time.    Arrival mode: Personal vehicle    PCP: Viviana Melchor MD        Past Medical History:  Past Medical History:   Diagnosis Date    Acute coronary syndrome     Atrial fibrillation     Cancer     Congestive heart failure 4/1/2019    Coronary artery disease     Elevated cholesterol     Hypertension     Macular degeneration     Thyroid condition        Past Surgical  History:  Past Surgical History:   Procedure Laterality Date    APPENDECTOMY      BACK SURGERY      x2    CARDIAC CATHETERIZATION  2016    CATARACT EXTRACTION      CHOLECYSTECTOMY      CORONARY ANGIOPLASTY  2015    non-obstructive CAD    EYE SURGERY      HIP SURGERY Bilateral     HYSTERECTOMY      KNEE SURGERY Bilateral     x 2 each         Family History:  Family History   Problem Relation Age of Onset    Cataracts Mother     Glaucoma Mother     Heart disease Mother     Diabetes Sister     Diabetes Maternal Grandmother     Hypertension Father        Social History:  Social History     Tobacco Use    Smoking status: Former Smoker     Packs/day: 1.00     Years: 30.00     Pack years: 30.00     Types: Cigarettes     Last attempt to quit: 1983     Years since quittin.9    Smokeless tobacco: Never Used   Substance and Sexual Activity    Alcohol use: No    Drug use: No    Sexual activity: Not on file        Review of Systems     Review of Systems   Constitutional: Negative for chills and fever.   HENT: Negative for sore throat.    Respiratory: Negative for shortness of breath.    Cardiovascular: Negative for chest pain.   Gastrointestinal: Negative for anal bleeding, blood in stool, diarrhea, nausea and vomiting.   Genitourinary: Negative for dysuria, hematuria and vaginal bleeding.   Musculoskeletal: Negative for back pain.   Skin: Negative for rash.   Neurological: Negative for dizziness, syncope, weakness, light-headedness and headaches.   Hematological: Does not bruise/bleed easily.   All other systems reviewed and are negative.     Physical Exam     Initial Vitals [19 1742]   BP Pulse Resp Temp SpO2   106/62 82 20 97.5 °F (36.4 °C) (!) 94 %      MAP       --          Physical Exam  Nursing Notes and Vital Signs Reviewed.  Constitutional: Patient is in no acute distress. Well-developed and well-nourished.  Head: Atraumatic. Normocephalic.  Eyes: PERRL. EOM intact. Conjunctivae  "are not pale. No scleral icterus.  ENT: Mucous membranes are moist. Oropharynx is clear and symmetric.    Neck: Supple. Full ROM. N  Cardiovascular: Regular rate. Regular rhythm. No murmurs, rubs, or gallops. Distal pulses are 2+ and symmetric.  Pulmonary/Chest: No respiratory distress. Clear to auscultation bilaterally. No wheezing or rales.  Abdominal: Soft and non-distended.  There is no tenderness.  No rebound, guarding, or rigidity.   Musculoskeletal: Moves all extremities. No obvious deformities. No edema. No calf tenderness.  Skin: Warm and dry.  Neurological:  Alert, awake, and appropriate.  Normal speech.  No acute focal neurological deficits are appreciated.  Psychiatric: Normal affect. Good eye contact. Appropriate in content.     ED Course   Procedures  ED Vital Signs:  Vitals:    12/18/19 1742   BP: 106/62   Pulse: 82   Resp: 20   Temp: 97.5 °F (36.4 °C)   TempSrc: Oral   SpO2: (!) 94%   Height: 5' 6" (1.676 m)            The Emergency Provider reviewed the vital signs and test results, which are outlined above.     ED Discussion     6:17 PM: Patient denies any evidence of bleeding from anywhere and also denies any trauma. Patient advised to hold her Coumadin today and tomorrow and to have her INR rechecked in 2 days. Patient informed to return to the ED if she develops any bleeding or if she falls/trauma. Gave pt all f/u instructions. All questions and concerns were addressed at this time. Pt expresses understanding of information and instructions, and is comfortable with plan to discharge. Pt is stable for discharge.    I discussed with patient and/or family/caretaker that evaluation in the ED does not suggest any emergent or life threatening medical conditions requiring immediate intervention beyond what was provided in the ED, and I believe patient is safe for discharge.  Regardless, an unremarkable evaluation in the ED does not preclude the development or presence of a serious of life threatening " condition. As such, patient was instructed to return immediately for any worsening or change in current symptoms.               ED Medication(s):  Medications - No data to display    New Prescriptions    No medications on file       Follow-up Information     Viviana Melchor MD. Schedule an appointment as soon as possible for a visit in 2 days.    Specialty:  Family Medicine  Why:  Return to the Emergency Room, If symptoms worsen  Contact information:  [address] [facility] [place] [address]  [phone]                       Scribe Attestation:   Scribe #1: I performed the above scribed service and the documentation accurately describes the services I performed. I attest to the accuracy of the note.     Attending:   Physician Attestation Statement for Scribe #1: I, Edita Blackmon MD, personally performed the services described in this documentation, as scribed by Pauline Barraza, in my presence, and it is both accurate and complete.           Clinical Impression       ICD-10-CM ICD-9-CM   1. Coumadin toxicity, accidental or unintentional, initial encounter T45.511A 964.2     E858.2   2. Chronic atrial fibrillation I48.20 427.31       Disposition:   Disposition: Discharged  Condition: Stable         Edita Blackmon MD  12/18/19 1827

## 2019-12-18 NOTE — TELEPHONE ENCOUNTER
----- Message from Silverio Ro MD sent at 12/18/2019  3:53 PM CST -----  Please call the patient regarding her abnormal result. If any bleeding needs to go to ER, if stable needs to follow up with coumadin asap to repeat labs and adjust coumadin, do not take any this evening.

## 2019-12-18 NOTE — PROGRESS NOTES
Coumadin clinic was contacted by Kathe with [facility] at 8:30am.   nurse notified coumadin clinic she attempted to get patient's INR on yesterday but she received an error message 8. Nurse was informed, INR may be too high to read with meter, therefore lab appt was scheduled.     Patient's INR is supra-therapeutic at >10.  Caregiver contacted.  Spoke to Mrs. Sin.  No new medications, but appetite has decreased.  Patient does drinks Boost daily.   No signs of bleeding noted.  Advised caregiver patient should report to ER due to unexplained elevated INR.  Caregiver agreed.  Patient currently en route to emergency department.

## 2019-12-18 NOTE — TELEPHONE ENCOUNTER
Spoke with patient's daughter January--states received instructions from [facility] to bring patient to E.R.--states she will contact [facility] to let nurse know and to find out protocol

## 2019-12-18 NOTE — PROGRESS NOTES
Caregiver contacted coumadin clinic stating cardiologist advised if no bleeding, than no need to go to ER.  Instructions given hold dose of warfarin on today and tomorrow.  Patient will also eat a serving of mustard greens. Advised patient to seek immediate medical attention if she notices any abnormal bleeding. Recheck INR on Friday, 12/20/19.

## 2019-12-19 NOTE — DISCHARGE INSTRUCTIONS
Please do not take your Coumadin/Warfarin for the next 2 days and then get your Coumadin level rechecked on Friday.

## 2019-12-20 ENCOUNTER — LAB VISIT (OUTPATIENT)
Dept: LAB | Facility: HOSPITAL | Age: 84
End: 2019-12-20
Attending: FAMILY MEDICINE
Payer: MEDICARE

## 2019-12-20 ENCOUNTER — ANTI-COAG VISIT (OUTPATIENT)
Dept: CARDIOLOGY | Facility: CLINIC | Age: 84
End: 2019-12-20
Payer: MEDICARE

## 2019-12-20 DIAGNOSIS — Z79.01 LONG TERM (CURRENT) USE OF ANTICOAGULANTS: ICD-10-CM

## 2019-12-20 DIAGNOSIS — Z79.01 LONG TERM (CURRENT) USE OF ANTICOAGULANTS: Primary | ICD-10-CM

## 2019-12-20 DIAGNOSIS — I48.20 CHRONIC ATRIAL FIBRILLATION: ICD-10-CM

## 2019-12-20 LAB
INR PPP: >10 (ref 0.8–1.2)
INR PPP: >8 (ref 2–3)
PROTHROMBIN TIME: >100 SEC (ref 9–12.5)

## 2019-12-20 PROCEDURE — 85610 POCT INR: ICD-10-PCS | Mod: GW,QW,HCNC,S$GLB | Performed by: INTERNAL MEDICINE

## 2019-12-20 PROCEDURE — 36415 COLL VENOUS BLD VENIPUNCTURE: CPT | Mod: HCNC

## 2019-12-20 PROCEDURE — 93793 ANTICOAG MGMT PT WARFARIN: CPT | Mod: HCNC,GW,S$GLB,

## 2019-12-20 PROCEDURE — 85610 PROTHROMBIN TIME: CPT | Mod: HCNC

## 2019-12-20 PROCEDURE — 85610 PROTHROMBIN TIME: CPT | Mod: GW,QW,HCNC,S$GLB | Performed by: INTERNAL MEDICINE

## 2019-12-20 PROCEDURE — 93793 PR ANTICOAGULANT MGMT FOR PT TAKING WARFARIN: ICD-10-PCS | Mod: HCNC,GW,S$GLB,

## 2019-12-20 NOTE — PROGRESS NOTES
Mrs. Cryer was seen in the emergency department on 12/18 due to elevated INR reading, PT/INR or urinalysis not performed.      Accompanied by .  Patient's INR is supra-therapeutic at >8, confirmed in the lab at >10.  INR remains elevated after 2 days of holding.   No signs of bleeding noted; advised patient to seek immediate medical attention if she notices any abnormal bleeding. Family members advised to contact hospice providers to have further evaluation done to ensure no bleeding is present in patient's urine.  Caregivers will also try to get patient to eat a serving of greens, patient has refused but agrees to drink Ensure/Boost.   Instructions given for patient to hold dose of warfarin on today, tomorrow, and Sunday (12/22).  Insturctions provided to patient, , daughter-in-law January and Kathe at [facility].  Recheck INR on 12/23/19.

## 2019-12-23 ENCOUNTER — ANTI-COAG VISIT (OUTPATIENT)
Dept: CARDIOLOGY | Facility: CLINIC | Age: 84
End: 2019-12-23
Payer: MEDICARE

## 2019-12-23 ENCOUNTER — LAB VISIT (OUTPATIENT)
Dept: LAB | Facility: HOSPITAL | Age: 84
End: 2019-12-23
Attending: INTERNAL MEDICINE
Payer: MEDICARE

## 2019-12-23 DIAGNOSIS — Z79.01 LONG TERM (CURRENT) USE OF ANTICOAGULANTS: ICD-10-CM

## 2019-12-23 DIAGNOSIS — I48.20 CHRONIC ATRIAL FIBRILLATION: ICD-10-CM

## 2019-12-23 LAB
INR PPP: >10 (ref 0.8–1.2)
INR PPP: >8
PROTHROMBIN TIME: >100 SEC (ref 9–12.5)

## 2019-12-23 PROCEDURE — 85610 PROTHROMBIN TIME: CPT | Mod: HCNC

## 2019-12-23 PROCEDURE — 36415 COLL VENOUS BLD VENIPUNCTURE: CPT | Mod: HCNC,PO

## 2019-12-23 PROCEDURE — 93793 ANTICOAG MGMT PT WARFARIN: CPT | Mod: GW,S$GLB,,

## 2019-12-23 PROCEDURE — 93793 PR ANTICOAGULANT MGMT FOR PT TAKING WARFARIN: ICD-10-PCS | Mod: GW,S$GLB,,

## 2019-12-24 ENCOUNTER — TELEPHONE (OUTPATIENT)
Dept: CARDIOLOGY | Facility: CLINIC | Age: 84
End: 2019-12-24

## 2019-12-24 RX ORDER — PHYTONADIONE 5 MG/1
10 TABLET ORAL ONCE
Qty: 2 TABLET | Refills: 0 | Status: SHIPPED | OUTPATIENT
Start: 2019-12-24 | End: 2019-12-24

## 2019-12-24 RX ORDER — PHYTONADIONE 5 MG/1
5 TABLET ORAL ONCE
Qty: 1 TABLET | Refills: 0 | Status: SHIPPED | OUTPATIENT
Start: 2019-12-24 | End: 2019-12-24

## 2019-12-24 NOTE — PROGRESS NOTES
INR > 10, discussed with patient's family last night. Told to hold coumadin, will give Vit K 5 mg today. No bleeding issues, follow up with me this week.     Silverio Ro MD, [facility]  Cardiovascular Disease  [facility], [place]  [phone] (P)

## 2019-12-24 NOTE — TELEPHONE ENCOUNTER
Spoke to the Daughter in-law. She was confused why the patient was receiving a medication for Vitamin K. The Patient's family member was notified of her INR results.

## 2019-12-24 NOTE — TELEPHONE ENCOUNTER
The patient has been notified of this information and all questions answered. Pt verbalized understanding and will call back with any further questions.     Appointment made on 12/26.

## 2019-12-24 NOTE — TELEPHONE ENCOUNTER
----- Message from Marnie Rushing sent at 12/24/2019 10:03 AM CST -----  Contact: pt  Pt daughter in law called in has questions about her mother in laws medication    Pt daughter in law jhony can be reached at [phone]

## 2019-12-26 ENCOUNTER — LAB VISIT (OUTPATIENT)
Dept: LAB | Facility: HOSPITAL | Age: 84
End: 2019-12-26
Attending: INTERNAL MEDICINE
Payer: MEDICARE

## 2019-12-26 ENCOUNTER — OFFICE VISIT (OUTPATIENT)
Dept: CARDIOLOGY | Facility: CLINIC | Age: 84
End: 2019-12-26
Payer: MEDICARE

## 2019-12-26 ENCOUNTER — TELEPHONE (OUTPATIENT)
Dept: CARDIOLOGY | Facility: CLINIC | Age: 84
End: 2019-12-26

## 2019-12-26 VITALS — DIASTOLIC BLOOD PRESSURE: 64 MMHG | HEART RATE: 60 BPM | SYSTOLIC BLOOD PRESSURE: 110 MMHG

## 2019-12-26 DIAGNOSIS — E78.2 MIXED HYPERLIPIDEMIA: Chronic | ICD-10-CM

## 2019-12-26 DIAGNOSIS — I10 ESSENTIAL HYPERTENSION: Chronic | ICD-10-CM

## 2019-12-26 DIAGNOSIS — R17 JAUNDICE: ICD-10-CM

## 2019-12-26 DIAGNOSIS — I25.84 CORONARY ARTERY DISEASE DUE TO CALCIFIED CORONARY LESION: ICD-10-CM

## 2019-12-26 DIAGNOSIS — I48.20 CHRONIC ATRIAL FIBRILLATION: ICD-10-CM

## 2019-12-26 DIAGNOSIS — I48.21 PERMANENT ATRIAL FIBRILLATION: Chronic | ICD-10-CM

## 2019-12-26 DIAGNOSIS — I50.32 CHRONIC DIASTOLIC CONGESTIVE HEART FAILURE: ICD-10-CM

## 2019-12-26 DIAGNOSIS — I25.10 CORONARY ARTERY DISEASE DUE TO CALCIFIED CORONARY LESION: ICD-10-CM

## 2019-12-26 DIAGNOSIS — Z79.01 ANTICOAGULATED ON COUMADIN: ICD-10-CM

## 2019-12-26 DIAGNOSIS — Z79.01 ANTICOAGULATED ON COUMADIN: Primary | ICD-10-CM

## 2019-12-26 DIAGNOSIS — I25.2 OLD MYOCARDIAL INFARCTION: ICD-10-CM

## 2019-12-26 LAB
ALBUMIN SERPL BCP-MCNC: 2.1 G/DL (ref 3.5–5.2)
ALP SERPL-CCNC: 456 U/L (ref 55–135)
ALT SERPL W/O P-5'-P-CCNC: 45 U/L (ref 10–44)
AMMONIA PLAS-SCNC: 53 UMOL/L (ref 10–50)
ANION GAP SERPL CALC-SCNC: 10 MMOL/L (ref 8–16)
AST SERPL-CCNC: 56 U/L (ref 10–40)
BILIRUB SERPL-MCNC: 24.5 MG/DL (ref 0.1–1)
BUN SERPL-MCNC: 18 MG/DL (ref 8–23)
CALCIUM SERPL-MCNC: 9.3 MG/DL (ref 8.7–10.5)
CHLORIDE SERPL-SCNC: 97 MMOL/L (ref 95–110)
CO2 SERPL-SCNC: 29 MMOL/L (ref 23–29)
CREAT SERPL-MCNC: 1 MG/DL (ref 0.5–1.4)
EST. GFR  (AFRICAN AMERICAN): >60 ML/MIN/1.73 M^2
EST. GFR  (NON AFRICAN AMERICAN): 52.2 ML/MIN/1.73 M^2
GLUCOSE SERPL-MCNC: 187 MG/DL (ref 70–110)
INR PPP: 6.6 (ref 0.8–1.2)
POTASSIUM SERPL-SCNC: 3.2 MMOL/L (ref 3.5–5.1)
PROT SERPL-MCNC: 5.6 G/DL (ref 6–8.4)
PROTHROMBIN TIME: 64 SEC (ref 9–12.5)
SODIUM SERPL-SCNC: 136 MMOL/L (ref 136–145)

## 2019-12-26 PROCEDURE — 3074F PR MOST RECENT SYSTOLIC BLOOD PRESSURE < 130 MM HG: ICD-10-PCS | Mod: HCNC,CPTII,S$GLB, | Performed by: INTERNAL MEDICINE

## 2019-12-26 PROCEDURE — 99215 PR OFFICE/OUTPT VISIT, EST, LEVL V, 40-54 MIN: ICD-10-PCS | Mod: GW,HCNC,S$GLB, | Performed by: INTERNAL MEDICINE

## 2019-12-26 PROCEDURE — 99999 PR PBB SHADOW E&M-EST. PATIENT-LVL II: ICD-10-PCS | Mod: PBBFAC,HCNC,, | Performed by: INTERNAL MEDICINE

## 2019-12-26 PROCEDURE — 3074F SYST BP LT 130 MM HG: CPT | Mod: HCNC,CPTII,S$GLB, | Performed by: INTERNAL MEDICINE

## 2019-12-26 PROCEDURE — 99215 OFFICE O/P EST HI 40 MIN: CPT | Mod: GW,HCNC,S$GLB, | Performed by: INTERNAL MEDICINE

## 2019-12-26 PROCEDURE — 85610 PROTHROMBIN TIME: CPT | Mod: HCNC

## 2019-12-26 PROCEDURE — 1159F PR MEDICATION LIST DOCUMENTED IN MEDICAL RECORD: ICD-10-PCS | Mod: HCNC,S$GLB,, | Performed by: INTERNAL MEDICINE

## 2019-12-26 PROCEDURE — 99499 RISK ADDL DX/OHS AUDIT: ICD-10-PCS | Mod: S$GLB,,, | Performed by: INTERNAL MEDICINE

## 2019-12-26 PROCEDURE — 36415 COLL VENOUS BLD VENIPUNCTURE: CPT | Mod: HCNC

## 2019-12-26 PROCEDURE — 3078F PR MOST RECENT DIASTOLIC BLOOD PRESSURE < 80 MM HG: ICD-10-PCS | Mod: HCNC,CPTII,S$GLB, | Performed by: INTERNAL MEDICINE

## 2019-12-26 PROCEDURE — 99999 PR PBB SHADOW E&M-EST. PATIENT-LVL II: CPT | Mod: PBBFAC,HCNC,, | Performed by: INTERNAL MEDICINE

## 2019-12-26 PROCEDURE — 82140 ASSAY OF AMMONIA: CPT | Mod: HCNC

## 2019-12-26 PROCEDURE — 1101F PT FALLS ASSESS-DOCD LE1/YR: CPT | Mod: HCNC,CPTII,S$GLB, | Performed by: INTERNAL MEDICINE

## 2019-12-26 PROCEDURE — 3078F DIAST BP <80 MM HG: CPT | Mod: HCNC,CPTII,S$GLB, | Performed by: INTERNAL MEDICINE

## 2019-12-26 PROCEDURE — 80053 COMPREHEN METABOLIC PANEL: CPT | Mod: HCNC

## 2019-12-26 PROCEDURE — 1159F MED LIST DOCD IN RCRD: CPT | Mod: HCNC,S$GLB,, | Performed by: INTERNAL MEDICINE

## 2019-12-26 PROCEDURE — 1101F PR PT FALLS ASSESS DOC 0-1 FALLS W/OUT INJ PAST YR: ICD-10-PCS | Mod: HCNC,CPTII,S$GLB, | Performed by: INTERNAL MEDICINE

## 2019-12-26 PROCEDURE — 99499 UNLISTED E&M SERVICE: CPT | Mod: S$GLB,,, | Performed by: INTERNAL MEDICINE

## 2019-12-26 RX ORDER — AMLODIPINE BESYLATE 5 MG/1
5 TABLET ORAL DAILY
Qty: 60 TABLET | Refills: 3 | Status: SHIPPED | OUTPATIENT
Start: 2019-12-26

## 2019-12-26 RX ORDER — METOPROLOL SUCCINATE 50 MG/1
50 TABLET, EXTENDED RELEASE ORAL 2 TIMES DAILY
Qty: 60 TABLET | Refills: 1 | Status: SHIPPED | OUTPATIENT
Start: 2019-12-26

## 2019-12-26 NOTE — TELEPHONE ENCOUNTER
----- Message from Silverio Ro MD sent at 12/26/2019  4:34 PM CST -----  Please call the patient regarding her abnormal result. Very elevated Bilirubin due to liver failure as discussed, discuss with hospice for further treatment or discuss with PCP regarding this.

## 2019-12-26 NOTE — TELEPHONE ENCOUNTER
Patient's daughter Donna contacted and was advised that lab result findings were abnormal; Elevated ammonia, start lactulose and Rifaximin. Awaiting further labs, follow up with PCP/hospice doctor. Very elevated Bilirubin due to liver failure as discussed, discuss with hospice for further treatment or discuss with PCP regarding this.  Donna stated understanding with no questions or concerns.

## 2019-12-26 NOTE — PROGRESS NOTES
Subjective:   Patient ID:  Delores M Cryer is a 84 y.o. female who presents for cardiac consult of Shortness of Breath (high INR)      Shortness of Breath   Associated symptoms include leg swelling.     The patient came in today for cardiac consult of Shortness of Breath (high INR)    Delores M Cryer is a 84 y.o. female pt with current medical conditions CAD, HFpEF,  Chronic AF on coumadin, HTN, carotid artery disease, lung CA presents for CV follow up.      6/28/18  She was started on Eliquis 1 year ago when she was admitted to [facility]. She has seen Dr. Martino in the past about 2 years ago at which point she was only on ASA but then started on Eliquis 1 year ago by Dr. Mitchell. Her  is present and the room and described she has mild dementia. She has no recent falls or bleeding. Does have occasional bruising.     12/20/18  Recent Carotids with mild plaque b/l. She has been started on Aricept by Dr. Laureano for dementia. Bp was was elevated last visit with PCP, Norvasc increased. She also has mild SOB/BOWMAN.  has said Eliquis costs > $400/month, will change to coumadin and enroll her in home health for INR check and PT/OT if needed. Mild leg swelling.     5/2/19  She had recent CT chest with mass which needs bronchoscopy for possible endobronchial biopsy by Dr. Carvajal, concern for malignancy. PET CT ordered as well. She presented to the ER last month with R sided pleuritic type chest pain. Trops neg, BNP elevated.   Recent INR 1.9, repeat ordered. Will need to be bridged on Lovenox before biopsy. NO CP/SOB lately.     12/26/19  INR > 10 3 days ago, given Vit K. She is on home hospice. She has been appearing more jaundice since about a week or so. Discussed will get labs, may need lactulose if ammonia is elevated. Will stop coumadin and statin due to worsening liver disease.     Patient feels  no chest pain, no PND, no palpitation, no dizziness, no syncope, no CNS symptoms.    Patient is compliant with  medications.    2D ECHO  CONCLUSIONS     1 - Normal left ventricular systolic function (EF 60-65%).     2 - Normal left ventricular diastolic function.     3 - Normal right ventricular systolic function .     4 - Concentric hypertrophy.     This document has been electronically    SIGNED BY: Richard Mitchell MD On: 2017 13:40    Carotid U/S  CONCLUSIONS   There is 20 - 39% right Internal Carotid stenosis.  There is 20 - 39% left Internal Carotid stenosis.    This document has been electronically    SIGNED BY: Camila Robb MD On: 2018 17:55  Past Medical History:   Diagnosis Date    Acute coronary syndrome     Atrial fibrillation     Cancer     Congestive heart failure 2019    Coronary artery disease     Elevated cholesterol     Hypertension     Macular degeneration     Thyroid condition        Past Surgical History:   Procedure Laterality Date    APPENDECTOMY      BACK SURGERY      x2    CARDIAC CATHETERIZATION  2016    CATARACT EXTRACTION      CHOLECYSTECTOMY      CORONARY ANGIOPLASTY  2015    non-obstructive CAD    EYE SURGERY      HIP SURGERY Bilateral     HYSTERECTOMY      KNEE SURGERY Bilateral     x 2 each       Social History     Tobacco Use    Smoking status: Former Smoker     Packs/day: 1.00     Years: 30.00     Pack years: 30.00     Types: Cigarettes     Last attempt to quit: 1983     Years since quittin.0    Smokeless tobacco: Never Used   Substance Use Topics    Alcohol use: No    Drug use: No       Family History   Problem Relation Age of Onset    Cataracts Mother     Glaucoma Mother     Heart disease Mother     Diabetes Sister     Diabetes Maternal Grandmother     Hypertension Father        Patient's Medications   New Prescriptions    No medications on file   Previous Medications    AMLODIPINE (NORVASC) 5 MG TABLET    Take 1 tablet (5 mg total) by mouth 2 (two) times daily.    ATORVASTATIN (LIPITOR) 40 MG TABLET    Take 1 tablet (40 mg total) by  mouth once daily.    CALCIUM CARBONATE-VIT D3-MIN (CALTRATE 600+D PLUS MINERALS) 600 MG (1,500 MG)-400 UNIT CHEW    Take 1 tablet by mouth Twice daily.    CITALOPRAM (CELEXA) 20 MG TABLET    Take 1 tablet (20 mg total) by mouth once daily.    DONEPEZIL (ARICEPT) 5 MG TABLET    TAKE 1 TABLET BY MOUTH EVERY EVENING    ENOXAPARIN (LOVENOX) 80 MG/0.8 ML SYRG    Inject 0.8 mLs (80 mg total) into the skin 2 (two) times daily.    FUROSEMIDE (LASIX) 20 MG TABLET    Take 1 tablet (20 mg total) by mouth daily as needed. Take next 3 days for leg swelling/breathing issues    LEVOTHYROXINE (SYNTHROID) 112 MCG TABLET    TAKE 1 TABLET BY MOUTH EVERY DAY    LEVOTHYROXINE (SYNTHROID) 125 MCG TABLET        METHYLCELLULOSE (CITRUCEL ORAL)    Take 1 tablet by mouth once daily.    METOPROLOL SUCCINATE (TOPROL-XL) 100 MG 24 HR TABLET    TAKE 1 TABLET BY MOUTH TWICE DAILY    MULTIVITAMIN W-MINERALS/LUTEIN (CENTRUM SILVER ORAL)    Take 1 tablet by mouth.    OMEPRAZOLE (PRILOSEC) 40 MG CAPSULE    Take 1 capsule (40 mg total) by mouth once daily.    TRIAMCINOLONE ACETONIDE 0.1% (KENALOG) 0.1 % CREAM    Apply topically 2 (two) times daily.    WARFARIN (COUMADIN) 2.5 MG TABLET    Take 0.5 tablets (1.25 mg total) by mouth Daily.   Modified Medications    No medications on file   Discontinued Medications    No medications on file       Review of Systems   Constitutional: Negative.    HENT: Negative.    Eyes: Negative.    Respiratory: Positive for shortness of breath.    Cardiovascular: Positive for leg swelling.   Gastrointestinal: Negative.    Genitourinary: Negative.    Musculoskeletal: Negative.    Skin: Negative.    Neurological: Negative.    Endo/Heme/Allergies: Negative.    Psychiatric/Behavioral: Negative.    All 12 systems otherwise negative.      Wt Readings from Last 3 Encounters:   05/03/19 84.3 kg (185 lb 13.6 oz)   05/02/19 84.8 kg (187 lb 1 oz)   05/02/19 84.4 kg (186 lb)     Temp Readings from Last 3 Encounters:   12/18/19 97.5 °F  (36.4 °C) (Oral)   05/03/19 97 °F (36.1 °C)   04/29/19 97.5 °F (36.4 °C) (Oral)     BP Readings from Last 3 Encounters:   12/26/19 110/64   12/18/19 128/63   05/03/19 138/71     Pulse Readings from Last 3 Encounters:   12/26/19 60   12/18/19 79   05/03/19 74       /64 (BP Location: Right arm, Patient Position: Sitting, BP Method: Medium (Manual))   Pulse 60     Objective:   Physical Exam   Constitutional: She is oriented to person, place, and time. She appears well-developed and well-nourished. No distress.   HENT:   Head: Normocephalic and atraumatic.   Nose: Nose normal.   Mouth/Throat: Oropharynx is clear and moist.   Eyes: Conjunctivae and EOM are normal. No scleral icterus.   Neck: Normal range of motion. Neck supple. No JVD present. No thyromegaly present.   Cardiovascular: Normal rate, S1 normal and S2 normal. An irregularly irregular rhythm present. Exam reveals no gallop, no S3, no S4 and no friction rub.   No murmur heard.  Pulmonary/Chest: Effort normal and breath sounds normal. No stridor. No respiratory distress. She has no wheezes. She has no rales. She exhibits no tenderness.   Abdominal: Soft. Bowel sounds are normal. She exhibits no distension and no mass. There is no tenderness. There is no rebound.   Genitourinary:   Genitourinary Comments: Deferred   Musculoskeletal: Normal range of motion. She exhibits edema (1+). She exhibits no tenderness or deformity.   Lymphadenopathy:     She has no cervical adenopathy.   Neurological: She is alert and oriented to person, place, and time. She exhibits normal muscle tone. Coordination normal.   Skin: Skin is warm and dry. No rash noted. She is not diaphoretic. No erythema. No pallor.   Psychiatric: She has a normal mood and affect. Her behavior is normal. Judgment and thought content normal.   Nursing note and vitals reviewed.      Lab Results   Component Value Date     04/10/2019    K 4.4 04/10/2019     04/10/2019    CO2 30 (H) 04/10/2019     BUN 19 04/10/2019    CREATININE 0.8 04/10/2019    GLU 77 04/10/2019    HGBA1C 5.4 09/28/2018    MG 2.3 02/10/2004    AST 24 04/10/2019    ALT 16 04/10/2019    ALBUMIN 3.3 (L) 04/10/2019    PROT 6.7 04/10/2019    BILITOT 1.1 (H) 04/10/2019    WBC 9.40 04/10/2019    HGB 14.7 04/10/2019    HCT 46.7 04/10/2019    MCV 93 04/10/2019     04/10/2019    INR >10.0 (HH) 12/23/2019    INR >8.0 12/23/2019    INR 1.9 (H) 05/18/2009    TSH 0.012 (L) 09/28/2018    CHOL 110 (L) 09/28/2018    HDL 41 09/28/2018    LDLCALC 55.6 (L) 09/28/2018    TRIG 67 09/28/2018     (H) 04/10/2019     Assessment:      1. Anticoagulated on Coumadin    2. Essential hypertension    3. Mixed hyperlipidemia    4. Permanent atrial fibrillation    5. Old myocardial infarction    6. Coronary artery disease due to calcified coronary lesion    7. Chronic atrial fibrillation    8. Chronic diastolic congestive heart failure        Plan:   1. Chronic AF  - rate controlled  - hold coumadin now  - elevated INR, stop coumadin until INR < 2 - likely sec to liver failure  - repeat INR, CMP, ammonia    2. CAD  - no angina  - cont meds    3. HTN -  - cont meds    4. HFpEF  - low salt diet  - cont lasix PRN    5. HLD  - stop statin    6. Carotid bruit/pior CVA  - mild plaque    Discussed with fam continue only essential meds. May need inpt hospice.     Thank you for allowing me to participate in this patient's care. Please do not hesitate to contact me with any questions or concerns. Consult note has been forwarded to the referral physician.     Silverio Ro MD, [facility]  Cardiovascular Disease  [facility], [place]  [phone] (P)

## 2019-12-27 ENCOUNTER — ANTI-COAG VISIT (OUTPATIENT)
Dept: CARDIOLOGY | Facility: CLINIC | Age: 84
End: 2019-12-27
Payer: MEDICARE

## 2019-12-27 ENCOUNTER — PATIENT MESSAGE (OUTPATIENT)
Dept: CARDIOLOGY | Facility: CLINIC | Age: 84
End: 2019-12-27

## 2019-12-27 DIAGNOSIS — I48.20 CHRONIC ATRIAL FIBRILLATION: ICD-10-CM

## 2019-12-27 DIAGNOSIS — Z79.01 LONG TERM (CURRENT) USE OF ANTICOAGULANTS: ICD-10-CM

## 2019-12-27 PROCEDURE — 93793 ANTICOAG MGMT PT WARFARIN: CPT | Mod: GW,S$GLB,,

## 2019-12-27 PROCEDURE — 93793 PR ANTICOAGULANT MGMT FOR PT TAKING WARFARIN: ICD-10-PCS | Mod: GW,S$GLB,,

## 2019-12-27 NOTE — PROGRESS NOTES
Patient's INR remains supratherapeutic at 6.6 (lab) after holding Warfarin dose since 12/18/2019.  Very elevated Bilirubin due to liver failure noted on 12/27/2019 - refer to Dr. Ro's telephone notes.  Contacted Mian Henderson (nurse) with [facility] at [phone].  Orders given:  Continue holding Warfarin.  Recheck INR on Monday, 12/30/2019.  Inform family members, patient would needs to seek medical attention (ED) for any signs of abnormal bleeding.  Any questions or concerns, contact the Coumadin Clinic at [phone] or Dr. Ro's (Cardiologist) office at [phone].

## 2019-12-31 ENCOUNTER — TELEPHONE (OUTPATIENT)
Dept: CARDIOLOGY | Facility: CLINIC | Age: 84
End: 2019-12-31

## 2019-12-31 NOTE — TELEPHONE ENCOUNTER
Attempted to contact [facility] at [phone], regarding overdue INR results.  Message left, please contact coumadin clinic [phone].

## 2020-01-07 ENCOUNTER — TELEPHONE (OUTPATIENT)
Dept: FAMILY MEDICINE | Facility: CLINIC | Age: 85
End: 2020-01-07

## 2022-07-07 NOTE — LETTER
April 29, 2019      Viviana Melchor MD  [address] [address]           [facility]  [address]  [place] [address]  Phone: [phone]  Fax: [phone]          Patient: Delores M Cryer   MR Number: [id number]   YOB: 1935   Date of Visit: 4/29/2019       Dear Dr. Viviana Melchor:    Thank you for referring Delores Cryer to me for evaluation. Attached you will find relevant portions of my assessment and plan of care.    If you have questions, please do not hesitate to call me. I look forward to following Delores Cryer along with you.    Sincerely,    Fracisco Paredes MD    Enclosure  CC:  No Recipients    If you would like to receive this communication electronically, please contact [email or web address] or [phone] to request more information on [employer] access.    For providers and/or their staff who would like to refer a patient to [facility], please contact us through our one-stop-shop provider referral line, [facility], at [phone].    If you feel you have received this communication in error or would no longer like to receive these types of communications, please e-mail [email or web address]          Alert and oriented to person, place and time